# Patient Record
Sex: MALE | Race: WHITE | NOT HISPANIC OR LATINO | Employment: OTHER | URBAN - METROPOLITAN AREA
[De-identification: names, ages, dates, MRNs, and addresses within clinical notes are randomized per-mention and may not be internally consistent; named-entity substitution may affect disease eponyms.]

---

## 2017-01-09 RX ORDER — CARVEDILOL 3.12 MG/1
TABLET ORAL
Qty: 180 TABLET | Refills: 1 | Status: SHIPPED | OUTPATIENT
Start: 2017-01-09 | End: 2017-07-10 | Stop reason: SDUPTHER

## 2017-01-09 RX ORDER — LOSARTAN POTASSIUM 50 MG/1
TABLET ORAL
Qty: 45 TABLET | Refills: 1 | Status: SHIPPED | OUTPATIENT
Start: 2017-01-09 | End: 2017-07-10 | Stop reason: SDUPTHER

## 2017-05-08 RX ORDER — SPIRONOLACTONE 25 MG/1
TABLET ORAL
Qty: 90 TABLET | Refills: 0 | Status: SHIPPED | OUTPATIENT
Start: 2017-05-08 | End: 2017-08-07 | Stop reason: SDUPTHER

## 2017-07-10 RX ORDER — CARVEDILOL 3.12 MG/1
TABLET ORAL
Qty: 180 TABLET | Refills: 0 | Status: SHIPPED | OUTPATIENT
Start: 2017-07-10 | End: 2017-10-09 | Stop reason: SDUPTHER

## 2017-07-10 RX ORDER — LOSARTAN POTASSIUM 50 MG/1
TABLET ORAL
Qty: 45 TABLET | Refills: 0 | Status: SHIPPED | OUTPATIENT
Start: 2017-07-10 | End: 2017-10-09 | Stop reason: SDUPTHER

## 2017-08-08 RX ORDER — SPIRONOLACTONE 25 MG/1
TABLET ORAL
Qty: 90 TABLET | Refills: 0 | Status: SHIPPED | OUTPATIENT
Start: 2017-08-08 | End: 2017-11-06 | Stop reason: SDUPTHER

## 2017-08-15 ENCOUNTER — OFFICE VISIT (OUTPATIENT)
Dept: GASTROENTEROLOGY | Facility: CLINIC | Age: 62
End: 2017-08-15

## 2017-08-15 VITALS
DIASTOLIC BLOOD PRESSURE: 88 MMHG | HEIGHT: 68 IN | BODY MASS INDEX: 24.31 KG/M2 | WEIGHT: 160.4 LBS | SYSTOLIC BLOOD PRESSURE: 136 MMHG

## 2017-08-15 DIAGNOSIS — K62.5 RECTAL BLEEDING: Primary | ICD-10-CM

## 2017-08-15 DIAGNOSIS — R19.7 DIARRHEA, UNSPECIFIED TYPE: ICD-10-CM

## 2017-08-15 DIAGNOSIS — R10.84 GENERALIZED ABDOMINAL PAIN: ICD-10-CM

## 2017-08-15 PROCEDURE — 99204 OFFICE O/P NEW MOD 45 MIN: CPT | Performed by: INTERNAL MEDICINE

## 2017-08-15 RX ORDER — PEG-3350, SODIUM SULFATE, SODIUM CHLORIDE, POTASSIUM CHLORIDE, SODIUM ASCORBATE AND ASCORBIC ACID 7.5-2.691G
KIT ORAL
Qty: 1 EACH | Refills: 0 | Status: SHIPPED | OUTPATIENT
Start: 2017-08-15 | End: 2018-03-28

## 2017-08-15 NOTE — PROGRESS NOTES
PATIENT INFORMATION  Teofilo Jade       - 1955    CHIEF COMPLAINT  Chief Complaint   Patient presents with   • Abdominal Pain   • Rectal Bleeding   • Diarrhea       HISTORY OF PRESENT ILLNESS  Abdominal Pain   Associated symptoms include diarrhea and hematochezia.   Rectal Bleeding   Associated symptoms include abdominal pain and fatigue.   Diarrhea    Associated symptoms include abdominal pain.           REVIEW OF SYSTEMS  Review of Systems   Constitutional: Positive for appetite change, fatigue and unexpected weight change.   HENT: Positive for rhinorrhea.    Respiratory: Positive for apnea.    Gastrointestinal: Positive for abdominal pain, anal bleeding, blood in stool, diarrhea and hematochezia.   All other systems reviewed and are negative.        ACTIVE PROBLEMS  Patient Active Problem List    Diagnosis   • Nonischemic cardiomyopathy [I42.9]   • Hypertension [I10]   • SEBASTIAN (obstructive sleep apnea) [G47.33]         PAST MEDICAL HISTORY  Past Medical History:   Diagnosis Date   • Hypertension    • Nonischemic cardiomyopathy    • SEBASTIAN (obstructive sleep apnea)          SURGICAL HISTORY  Past Surgical History:   Procedure Laterality Date   • KNEE SURGERY  2002    both knee         FAMILY HISTORY  Family History   Problem Relation Age of Onset   • Heart disease Mother    • Diabetes Mother    • Stroke Mother    • Hypertension Mother    • Heart disease Father    • Diabetes Father          SOCIAL HISTORY  Social History     Occupational History   • Not on file.     Social History Main Topics   • Smoking status: Former Smoker   • Smokeless tobacco: Not on file      Comment: caffine 3 cups daily   • Alcohol use Yes      Comment: occ   • Drug use: Not on file   • Sexual activity: Not on file         CURRENT MEDICATIONS    Current Outpatient Prescriptions:   •  aspirin 81 MG tablet, Take  by mouth daily., Disp: , Rfl:   •  carvedilol (COREG) 3.125 MG tablet, TAKE 1 TABLET BY MOUTH 2 TIMES A DAY AS  "DIRECTED., Disp: 180 tablet, Rfl: 0  •  losartan (COZAAR) 50 MG tablet, TAKE 1/2 TABLET EVERY DAY., Disp: 45 tablet, Rfl: 0  •  Multiple Vitamins-Minerals (CENTRUM SILVER PO), Take 1 tablet by mouth Daily., Disp: , Rfl:   •  pentoxifylline (TRENtal) 400 MG CR tablet, , Disp: , Rfl:   •  spironolactone (ALDACTONE) 25 MG tablet, TAKE 1 TABLET BY MOUTH DAILY., Disp: 90 tablet, Rfl: 0  •  traZODone (DESYREL) 100 MG tablet, Take 100 mg by mouth Every Night., Disp: , Rfl:   •  zolpidem CR (AMBIEN CR) 12.5 MG CR tablet, Take  by mouth., Disp: , Rfl:     ALLERGIES  Review of patient's allergies indicates no known allergies.    VITALS  Vitals:    08/15/17 1410   Height: 68\" (172.7 cm)       LAST RESULTS   Hospital Outpatient Visit on 06/11/2015   Component Date Value Ref Range Status   • Glucose 06/11/2015 101* 65 - 99 mg/dL Final   • BUN 06/11/2015 14  6 - 20 mg/dL Final   • Creatinine 06/11/2015 1.04  0.76 - 1.27 mg/dL Final   • Sodium 06/11/2015 141  136 - 145 mmol/L Final   • Potassium 06/11/2015 4.4  3.5 - 5.2 mmol/L Final   • Chloride 06/11/2015 103  98 - 107 mmol/L Final   • CO2 06/11/2015 29  22 - 29 mmol/L Final   • Calcium 06/11/2015 9.0  8.6 - 10.5 mg/dL Final   • eGFR 06/11/2015 >60  ml/min/1.732 Final    Comment: DF by IF @ 06/11/2015 14:44  GFR Normal                            >60  Chronic Kidney Disease          <60  Kidney Failure                         <15       No results found.    PHYSICAL EXAM  Physical Exam    ASSESSMENT  Diagnoses and all orders for this visit:    Rectal bleeding    Diarrhea, unspecified type          PLAN  No Follow-up on file.                          "

## 2017-08-16 ENCOUNTER — APPOINTMENT (OUTPATIENT)
Dept: LAB | Facility: HOSPITAL | Age: 62
End: 2017-08-16
Attending: INTERNAL MEDICINE

## 2017-08-16 PROCEDURE — 87493 C DIFF AMPLIFIED PROBE: CPT | Performed by: INTERNAL MEDICINE

## 2017-08-16 PROCEDURE — 87045 FECES CULTURE AEROBIC BACT: CPT | Performed by: INTERNAL MEDICINE

## 2017-08-16 PROCEDURE — 87046 STOOL CULTR AEROBIC BACT EA: CPT | Performed by: INTERNAL MEDICINE

## 2017-08-17 LAB — C DIFF TOX GENS STL QL NAA+PROBE: NEGATIVE

## 2017-08-18 LAB
BACTERIA SPEC AEROBE CULT: NORMAL
BACTERIA SPEC AEROBE CULT: NORMAL

## 2017-09-29 ENCOUNTER — ANESTHESIA EVENT (OUTPATIENT)
Dept: PERIOP | Facility: HOSPITAL | Age: 62
End: 2017-09-29

## 2017-10-02 ENCOUNTER — ANESTHESIA (OUTPATIENT)
Dept: PERIOP | Facility: HOSPITAL | Age: 62
End: 2017-10-02

## 2017-10-02 ENCOUNTER — HOSPITAL ENCOUNTER (OUTPATIENT)
Facility: HOSPITAL | Age: 62
Setting detail: HOSPITAL OUTPATIENT SURGERY
Discharge: HOME | End: 2017-10-02
Attending: INTERNAL MEDICINE | Admitting: INTERNAL MEDICINE

## 2017-10-02 VITALS
HEART RATE: 51 BPM | HEIGHT: 68 IN | OXYGEN SATURATION: 96 % | DIASTOLIC BLOOD PRESSURE: 70 MMHG | WEIGHT: 155.13 LBS | BODY MASS INDEX: 23.51 KG/M2 | SYSTOLIC BLOOD PRESSURE: 123 MMHG | TEMPERATURE: 98.5 F | RESPIRATION RATE: 14 BRPM

## 2017-10-02 DIAGNOSIS — R19.7 DIARRHEA, UNSPECIFIED TYPE: ICD-10-CM

## 2017-10-02 DIAGNOSIS — K62.5 RECTAL BLEEDING: ICD-10-CM

## 2017-10-02 DIAGNOSIS — R10.84 GENERALIZED ABDOMINAL PAIN: ICD-10-CM

## 2017-10-02 LAB — C DIFF TOX GENS STL QL NAA+PROBE: NEGATIVE

## 2017-10-02 PROCEDURE — 87493 C DIFF AMPLIFIED PROBE: CPT | Performed by: INTERNAL MEDICINE

## 2017-10-02 PROCEDURE — 45380 COLONOSCOPY AND BIOPSY: CPT | Performed by: INTERNAL MEDICINE

## 2017-10-02 PROCEDURE — 25010000002 PROPOFOL 10 MG/ML EMULSION: Performed by: NURSE ANESTHETIST, CERTIFIED REGISTERED

## 2017-10-02 RX ORDER — ONDANSETRON 2 MG/ML
4 INJECTION INTRAMUSCULAR; INTRAVENOUS ONCE AS NEEDED
Status: CANCELLED | OUTPATIENT
Start: 2017-10-02

## 2017-10-02 RX ORDER — SODIUM CHLORIDE, SODIUM LACTATE, POTASSIUM CHLORIDE, CALCIUM CHLORIDE 600; 310; 30; 20 MG/100ML; MG/100ML; MG/100ML; MG/100ML
100 INJECTION, SOLUTION INTRAVENOUS CONTINUOUS
Status: CANCELLED | OUTPATIENT
Start: 2017-10-02

## 2017-10-02 RX ORDER — LIDOCAINE HYDROCHLORIDE 20 MG/ML
INJECTION, SOLUTION INFILTRATION; PERINEURAL AS NEEDED
Status: DISCONTINUED | OUTPATIENT
Start: 2017-10-02 | End: 2017-10-02 | Stop reason: SURG

## 2017-10-02 RX ORDER — SODIUM CHLORIDE, SODIUM LACTATE, POTASSIUM CHLORIDE, CALCIUM CHLORIDE 600; 310; 30; 20 MG/100ML; MG/100ML; MG/100ML; MG/100ML
9 INJECTION, SOLUTION INTRAVENOUS CONTINUOUS
Status: DISCONTINUED | OUTPATIENT
Start: 2017-10-02 | End: 2017-10-02 | Stop reason: HOSPADM

## 2017-10-02 RX ORDER — SODIUM CHLORIDE 0.9 % (FLUSH) 0.9 %
1-10 SYRINGE (ML) INJECTION AS NEEDED
Status: DISCONTINUED | OUTPATIENT
Start: 2017-10-02 | End: 2017-10-02 | Stop reason: HOSPADM

## 2017-10-02 RX ORDER — PROPOFOL 10 MG/ML
VIAL (ML) INTRAVENOUS CONTINUOUS PRN
Status: DISCONTINUED | OUTPATIENT
Start: 2017-10-02 | End: 2017-10-02 | Stop reason: SURG

## 2017-10-02 RX ORDER — LIDOCAINE HYDROCHLORIDE 10 MG/ML
0.5 INJECTION, SOLUTION EPIDURAL; INFILTRATION; INTRACAUDAL; PERINEURAL ONCE AS NEEDED
Status: COMPLETED | OUTPATIENT
Start: 2017-10-02 | End: 2017-10-02

## 2017-10-02 RX ORDER — SODIUM CHLORIDE 9 MG/ML
40 INJECTION, SOLUTION INTRAVENOUS AS NEEDED
Status: DISCONTINUED | OUTPATIENT
Start: 2017-10-02 | End: 2017-10-02 | Stop reason: HOSPADM

## 2017-10-02 RX ADMIN — LIDOCAINE HYDROCHLORIDE 0.5 ML: 10 INJECTION, SOLUTION EPIDURAL; INFILTRATION; INTRACAUDAL; PERINEURAL at 11:00

## 2017-10-02 RX ADMIN — PROPOFOL 60 MCG/KG/MIN: 10 INJECTION, EMULSION INTRAVENOUS at 11:13

## 2017-10-02 RX ADMIN — SODIUM CHLORIDE, POTASSIUM CHLORIDE, SODIUM LACTATE AND CALCIUM CHLORIDE 9 ML/HR: 600; 310; 30; 20 INJECTION, SOLUTION INTRAVENOUS at 10:59

## 2017-10-02 RX ADMIN — LIDOCAINE HYDROCHLORIDE 100 MG: 20 INJECTION, SOLUTION INFILTRATION; PERINEURAL at 11:13

## 2017-10-02 RX ADMIN — SODIUM CHLORIDE, POTASSIUM CHLORIDE, SODIUM LACTATE AND CALCIUM CHLORIDE: 600; 310; 30; 20 INJECTION, SOLUTION INTRAVENOUS at 10:43

## 2017-10-02 NOTE — OP NOTE
Colonoscopy Note:    Indication:  Abdominal pain, rectal bleeding, diarrhea    Consent: Procedure colonoscopy was explained to the patient and detail including but not limited to the, patient of bleeding perforation and possible reactions to sedation.    Sedation: Sedation was provided by anesthesia.    Procedure:  After excellent sedation a digital rectal examinations performed and a flexible colonoscope was inserted into the rectum there is colitis noted this scope was inserted into the rectum.  There is erythema and small ulcerations noted this was pretty severe up to about 75 cm from the anal verge from there to the level of the cecum there was colitis noted but with decreased erythema and edema.  There is small scattered ulcerations that were still noted.  The cecum was identified by both the ileocecal valve and the appendiceal orifice the right.  The ileocecal valve was intubated and the terminal ileum was entered.  This appeared normal.  Random biopsies were obtained.  Scope was slowly withdrawn back out.  In the ascending colon he had an area that appeared nodular and erythematous with some ulcerations.  It was hard to say if this is a true polyp versus an area of nodularity with erythema.  Nonetheless this area was removed using forceps in a piecemeal fashion.  Random biopsies were obtained of the right colon, transverse colon, left colon and rectum.  The scope was then slowly withdrawn to the rectum and retroflex were internal hemorrhoids are noted.  Stool samples were obtained.    Impression/Plan:  Pan colitis  Nodular area in the ascending colon  Internal hemorrhoids  This appears likely consistent with ulcerative colitis.  We'll await biopsy and stool study results.  He'll see me back in the office in about 1 week.

## 2017-10-02 NOTE — ANESTHESIA PREPROCEDURE EVALUATION
Anesthesia Evaluation     Patient summary reviewed and Nursing notes reviewed   no history of anesthetic complications:  NPO Solid Status: > 8 hours  NPO Liquid Status: > 8 hours     Airway   Mallampati: II  TM distance: >3 FB  Neck ROM: full  no difficulty expected  Dental    (+) lower dentures and upper dentures    Pulmonary     breath sounds clear to auscultation  (+) a smoker (quit 5 yrs ago) Former, sleep apnea on CPAP,   Cardiovascular   Exercise tolerance: good (4-7 METS)    ECG reviewed  Rhythm: regular  Rate: normal    (+) hypertension well controlled,     ROS comment: Sunil Wolf III, MD     12/7/2016 10:47 AM    ECG 12 Lead  Date/Time: 12/7/2016 10:45 AM  Performed by: SUNIL WOLF III  Authorized by: SUNIL WOLF III   Comparison: compared with previous ECG   Similar to previous ECG  Rhythm: sinus rhythm  Rate: normal  Conduction: conduction normal  ST Segments: ST segments normal  T Waves: T waves normal  QRS axis: normal  Other: no other findings  Clinical impression: normal ECG    Nonischemic cardiomyopathy    Neuro/Psych- negative ROS  GI/Hepatic/Renal/Endo    (+)  PUD,     ROS Comment: Lost 20# in feb.      Musculoskeletal (-) negative ROS    Abdominal    Substance History   (+) alcohol use (occ),      OB/GYN          Other - negative ROS           Phys Exam Other: Partial denture upper and lower                                Anesthesia Plan    ASA 2     MAC     intravenous induction   Anesthetic plan and risks discussed with patient.  Use of blood products discussed with patient  Consented to blood products.

## 2017-10-02 NOTE — H&P
PATIENT INFORMATION  Teofilo Jade         - 1955     CHIEF COMPLAINT      Chief Complaint   Patient presents with   • Abdominal Pain   • Rectal Bleeding   • Diarrhea         HISTORY OF PRESENT ILLNESS  Abdominal Pain   Associated symptoms include diarrhea and hematochezia.   Rectal Bleeding   Associated symptoms include abdominal pain and fatigue.   Diarrhea    Associated symptoms include abdominal pain.      62 yo with 6 weeks of rectal bleeding. He was tried with 2 rounds of anucort without complete resolution. He has history of hemorrhoids.  He has also noticed postprandial diarrhea, within 15 minutes of eating, with associated crampy lower abdnminal pain. Diarrhea is watery/bloody and with mucus.  Abdominal pain is sharp, periumbilical , without radiation.  Weight has been down 20 pounds in February but has been stable since.  Last cls was over 9 years ago.   Labs from pcp normal per patient. No fever or chills. No melena.  Mom with colon polyps.  REVIEW OF SYSTEMS  Review of Systems   Constitutional: Positive for appetite change, fatigue and unexpected weight change.   HENT: Positive for rhinorrhea.    Gastrointestinal: Positive for abdominal pain, anal bleeding, blood in stool, diarrhea and hematochezia.   Psychiatric/Behavioral:        Anxiety   All other systems reviewed and are negative.           ACTIVE PROBLEMS      Patient Active Problem List     Diagnosis   • Nonischemic cardiomyopathy [I42.9]   • Hypertension [I10]   • SEBASTIAN (obstructive sleep apnea) [G47.33]            PAST MEDICAL HISTORY   Medical History         Past Medical History:   Diagnosis Date   • Colon polyp     • Hypertension     • Nonischemic cardiomyopathy     • SEBASTIAN (obstructive sleep apnea)                 SURGICAL HISTORY   Surgical History          Past Surgical History:   Procedure Laterality Date   • COLONOSCOPY       • KNEE SURGERY   2002     both knee               FAMILY HISTORY        Family History   Problem Relation  "Age of Onset   • Heart disease Mother     • Diabetes Mother     • Stroke Mother     • Hypertension Mother     • Colon polyps Mother     • Heart disease Father     • Diabetes Father              SOCIAL HISTORY  Social History          Occupational History   • Not on file.              Social History Main Topics    • Smoking status: Former Smoker    • Smokeless tobacco: Not on file          Comment: caffine 3 cups daily    • Alcohol use Yes         Comment: occ    • Drug use: Not on file    • Sexual activity: Not on file             CURRENT MEDICATIONS     Current Outpatient Prescriptions:   •  aspirin 81 MG tablet, Take  by mouth daily., Disp: , Rfl:   •  carvedilol (COREG) 3.125 MG tablet, TAKE 1 TABLET BY MOUTH 2 TIMES A DAY AS DIRECTED., Disp: 180 tablet, Rfl: 0  •  losartan (COZAAR) 50 MG tablet, TAKE 1/2 TABLET EVERY DAY., Disp: 45 tablet, Rfl: 0  •  Multiple Vitamins-Minerals (CENTRUM SILVER PO), Take 1 tablet by mouth Daily., Disp: , Rfl:   •  pentoxifylline (TRENtal) 400 MG CR tablet, , Disp: , Rfl:   •  spironolactone (ALDACTONE) 25 MG tablet, TAKE 1 TABLET BY MOUTH DAILY., Disp: 90 tablet, Rfl: 0  •  traZODone (DESYREL) 100 MG tablet, Take 100 mg by mouth Every Night., Disp: , Rfl:   •  zolpidem CR (AMBIEN CR) 12.5 MG CR tablet, Take  by mouth., Disp: , Rfl:      ALLERGIES  Review of patient's allergies indicates no known allergies.     VITALS   Vitals        Vitals:     08/15/17 1410   BP: 136/88   Weight: 160 lb 6.4 oz (72.8 kg)   Height: 68\" (172.7 cm)            LAST RESULTS                             Hospital Outpatient Visit on 06/11/2015   Component Date Value Ref Range Status   • Glucose 06/11/2015 101* 65 - 99 mg/dL Final   • BUN 06/11/2015 14  6 - 20 mg/dL Final   • Creatinine 06/11/2015 1.04  0.76 - 1.27 mg/dL Final   • Sodium 06/11/2015 141  136 - 145 mmol/L Final   • Potassium 06/11/2015 4.4  3.5 - 5.2 mmol/L Final   • Chloride 06/11/2015 103  98 - 107 mmol/L Final   • CO2 06/11/2015 29  22 - " 29 mmol/L Final   • Calcium 06/11/2015 9.0  8.6 - 10.5 mg/dL Final   • eGFR 06/11/2015 >60  ml/min/1.732 Final     Comment: DF by IF @ 06/11/2015 14:44  GFR Normal                            >60  Chronic Kidney Disease          <60  Kidney Failure                         <15      No results found.     PHYSICAL EXAM  Physical Exam   Constitutional: He is oriented to person, place, and time. He appears well-developed and well-nourished. No distress.   HENT:   Head: Normocephalic and atraumatic.   Eyes: EOM are normal. Pupils are equal, round, and reactive to light.   Neck: Neck supple. No tracheal deviation present.   Cardiovascular: Normal rate, regular rhythm, normal heart sounds and intact distal pulses.  Exam reveals no gallop and no friction rub.    No murmur heard.  Pulmonary/Chest: Effort normal and breath sounds normal. No respiratory distress. He has no wheezes. He has no rales. He exhibits no tenderness.   Abdominal: Soft. Bowel sounds are normal. He exhibits no distension. There is no tenderness. There is no rebound and no guarding.   Musculoskeletal: He exhibits no edema.   Lymphadenopathy:     He has no cervical adenopathy.   Neurological: He is alert and oriented to person, place, and time.   Skin: Skin is warm. He is not diaphoretic. No erythema.   Psychiatric: He has a normal mood and affect. His behavior is normal. Judgment and thought content normal.   Nursing note and vitals reviewed.        ASSESSMENT  Diagnoses and all orders for this visit:     Rectal bleeding  -     Stool Culture  -     Clostridium Difficile Toxin  -     Case Request; Standing  -     Case Request     Diarrhea, unspecified type  -     Stool Culture  -     Clostridium Difficile Toxin  -     Case Request; Standing  -     Case Request     Generalized abdominal pain  -     Stool Culture  -     Clostridium Difficile Toxin  -     Case Request; Standing  -     Case Request     Other orders  -     Implement Anesthesia orders day of  procedure.; Standing  -     Obtain informed consent; Standing              PLAN  No Follow-up on file.     Get labs from pcp office.        Risks, benefits and alternatives discussed including but not limited to the complications of bleeding, perforation and sedation related problems.

## 2017-10-02 NOTE — ANESTHESIA POSTPROCEDURE EVALUATION
Patient: Teofilo Jade    Procedure Summary     Date Anesthesia Start Anesthesia Stop Room / Location    10/02/17 1111 1200 [facility] / [facility]       Procedure Diagnosis Surgeon Provider    COLONOSCOPY, biopsy; polypectomy (N/A ) Rectal bleeding; Generalized abdominal pain; Diarrhea, unspecified type  (Rectal bleeding [K62.5]; Generalized abdominal pain [R10.84]; Diarrhea, unspecified type [R19.7]) MD Jennifer Thomas CRNA          Anesthesia Type: MAC  Last vitals  BP   123/70 (10/02/17 1300)    Temp        Pulse   51 (10/02/17 1300)   Resp   14 (10/02/17 1300)    SpO2   96 % (10/02/17 1300)      Post Anesthesia Care and Evaluation    Patient location during evaluation: PHASE II  Patient participation: complete - patient participated  Level of consciousness: awake and alert  Pain score: 0  Pain management: adequate  Airway patency: patent  Anesthetic complications: No anesthetic complications  PONV Status: none  Cardiovascular status: acceptable  Respiratory status: acceptable  Hydration status: acceptable

## 2017-10-02 NOTE — PLAN OF CARE
Problem: Patient Care Overview (Adult)  Goal: Plan of Care Review  Outcome: Ongoing (interventions implemented as appropriate)    10/02/17 1058   Coping/Psychosocial Response Interventions   Plan Of Care Reviewed With patient   Patient Care Overview   Progress improving   Outcome Evaluation   Outcome Summary/Follow up Plan vss, ready for or       Goal: Adult Individualization and Mutuality  Outcome: Ongoing (interventions implemented as appropriate)    Problem: GI Endoscopy (Adult)  Goal: Signs and Symptoms of Listed Potential Problems Will be Absent or Manageable (GI Endoscopy)  Outcome: Ongoing (interventions implemented as appropriate)

## 2017-10-02 NOTE — PLAN OF CARE
Problem: GI Endoscopy (Adult)  Goal: Signs and Symptoms of Listed Potential Problems Will be Absent or Manageable (GI Endoscopy)  Outcome: Ongoing (interventions implemented as appropriate)    10/02/17 1117   GI Endoscopy   Problems Assessed (GI Endoscopy) all   Problems Present (GI Endoscopy) none

## 2017-10-09 RX ORDER — CARVEDILOL 3.12 MG/1
TABLET ORAL
Qty: 180 TABLET | Refills: 0 | Status: SHIPPED | OUTPATIENT
Start: 2017-10-09 | End: 2017-12-16 | Stop reason: SDUPTHER

## 2017-10-09 RX ORDER — LOSARTAN POTASSIUM 50 MG/1
TABLET ORAL
Qty: 45 TABLET | Refills: 0 | Status: SHIPPED | OUTPATIENT
Start: 2017-10-09 | End: 2017-12-16 | Stop reason: SDUPTHER

## 2017-10-10 ENCOUNTER — OFFICE VISIT (OUTPATIENT)
Dept: GASTROENTEROLOGY | Facility: CLINIC | Age: 62
End: 2017-10-10

## 2017-10-10 VITALS
DIASTOLIC BLOOD PRESSURE: 68 MMHG | SYSTOLIC BLOOD PRESSURE: 128 MMHG | BODY MASS INDEX: 23.89 KG/M2 | WEIGHT: 157.6 LBS | HEIGHT: 68 IN

## 2017-10-10 DIAGNOSIS — K51.00 ULCERATIVE PANCOLITIS WITHOUT COMPLICATION (HCC): ICD-10-CM

## 2017-10-10 DIAGNOSIS — K62.5 RECTAL BLEEDING: Primary | ICD-10-CM

## 2017-10-10 PROCEDURE — 99214 OFFICE O/P EST MOD 30 MIN: CPT | Performed by: INTERNAL MEDICINE

## 2017-10-10 RX ORDER — MESALAMINE 1.2 G/1
1200 TABLET, DELAYED RELEASE ORAL 4 TIMES DAILY
Qty: 120 TABLET | Refills: 4 | Status: SHIPPED | OUTPATIENT
Start: 2017-10-10 | End: 2018-04-04 | Stop reason: SDUPTHER

## 2017-10-10 NOTE — PROGRESS NOTES
PATIENT INFORMATION  Teofilo Jade       - 1955    CHIEF COMPLAINT  Chief Complaint   Patient presents with   • Abdominal Pain   • Rectal Bleeding   • Diarrhea       HISTORY OF PRESENT ILLNESS  Abdominal Pain   Associated symptoms include diarrhea and hematochezia.   Rectal Bleeding   Associated symptoms include abdominal pain and fatigue.   Diarrhea    Associated symptoms include abdominal pain.       He is here today in follow after his cls. Pathology is reviewed with him and is consistent with UC. He is noticing  Intermittent crampy pain and diarrhea and blood in the stool. No fever or chills. Weight has been stable.   Stool negative for c.diff.  REVIEW OF SYSTEMS  Review of Systems   Constitutional: Positive for appetite change and fatigue.   Respiratory: Positive for apnea.    Gastrointestinal: Positive for abdominal pain, anal bleeding, blood in stool, diarrhea, hematochezia and rectal pain.   All other systems reviewed and are negative.        ACTIVE PROBLEMS  Patient Active Problem List    Diagnosis   • Rectal bleeding [K62.5]     Overview Note:     Added automatically from request for surgery [id number]     • Generalized abdominal pain [R10.84]     Overview Note:     Added automatically from request for surgery [id number]     • Diarrhea [R19.7]     Overview Note:     Added automatically from request for surgery [id number]     • Nonischemic cardiomyopathy [I42.8]   • Hypertension [I10]   • SEBASTIAN (obstructive sleep apnea) [G47.33]         PAST MEDICAL HISTORY  Past Medical History:   Diagnosis Date   • Anxiety    • Colon polyp    • Hypertension    • Nonischemic cardiomyopathy    • SEBASTIAN (obstructive sleep apnea)    • Ulcer of abdomen wall          SURGICAL HISTORY  Past Surgical History:   Procedure Laterality Date   • COLONOSCOPY     • COLONOSCOPY N/A 10/2/2017    Procedure: COLONOSCOPY, biopsy; polypectomy;  Surgeon: Sushila Amezquita MD;  Location: [facility];  Service:    • KNEE SURGERY  2002    both knee  "        FAMILY HISTORY  Family History   Problem Relation Age of Onset   • Heart disease Mother    • Diabetes Mother    • Stroke Mother    • Hypertension Mother    • Colon polyps Mother    • Heart disease Father    • Diabetes Father          SOCIAL HISTORY  Social History     Occupational History   • Not on file.     Social History Main Topics   • Smoking status: Former Smoker   • Smokeless tobacco: Never Used      Comment: caffine 3 cups daily - quit 5 yrs ago   • Alcohol use Yes      Comment: occ -    • Drug use: No   • Sexual activity: Defer         CURRENT MEDICATIONS    Current Outpatient Prescriptions:   •  aspirin 81 MG tablet, Take  by mouth daily., Disp: , Rfl:   •  carvedilol (COREG) 3.125 MG tablet, TAKE 1 TABLET BY MOUTH 2 TIMES A DAY AS DIRECTED., Disp: 180 tablet, Rfl: 0  •  losartan (COZAAR) 50 MG tablet, TAKE 1/2 TABLET EVERY DAY., Disp: 45 tablet, Rfl: 0  •  mesalamine (LIALDA) 1.2 g EC tablet, Take 1 tablet by mouth 4 (Four) Times a Day., Disp: 120 tablet, Rfl: 4  •  Multiple Vitamins-Minerals (CENTRUM SILVER PO), Take 1 tablet by mouth Daily., Disp: , Rfl:   •  PEG-KCl-NaCl-NaSulf-Na Asc-C (MOVIPREP) 100 g reconstituted solution, Take as directed, Disp: 1 each, Rfl: 0  •  pentoxifylline (TRENtal) 400 MG CR tablet, , Disp: , Rfl:   •  spironolactone (ALDACTONE) 25 MG tablet, TAKE 1 TABLET BY MOUTH DAILY., Disp: 90 tablet, Rfl: 0  •  traZODone (DESYREL) 100 MG tablet, Take 100 mg by mouth Every Night., Disp: , Rfl:   •  zolpidem CR (AMBIEN CR) 12.5 MG CR tablet, Take  by mouth., Disp: , Rfl:     ALLERGIES  Review of patient's allergies indicates no known allergies.    VITALS  Vitals:    10/10/17 1351   BP: 128/68   Weight: 157 lb 9.6 oz (71.5 kg)   Height: 68\" (172.7 cm)       LAST RESULTS   Admission on 10/02/2017, Discharged on 10/02/2017   Component Date Value Ref Range Status   • C. Difficile Toxins by PCR 10/02/2017 Negative  Negative Final     No results found.    PHYSICAL EXAM  Physical Exam "   Constitutional: He is oriented to person, place, and time. He appears well-developed and well-nourished. No distress.   HENT:   Head: Normocephalic and atraumatic.   Eyes: EOM are normal. Pupils are equal, round, and reactive to light.   Neck: Neck supple. No tracheal deviation present.   Cardiovascular: Normal rate, regular rhythm, normal heart sounds and intact distal pulses.  Exam reveals no gallop and no friction rub.    No murmur heard.  Pulmonary/Chest: Effort normal and breath sounds normal. No respiratory distress. He has no wheezes. He has no rales. He exhibits no tenderness.   Abdominal: Soft. Bowel sounds are normal. He exhibits no distension. There is tenderness. There is no rebound and no guarding.   Mild lower abdominal tenderness, no rebound or guarding.   Musculoskeletal: He exhibits no edema.   Lymphadenopathy:     He has no cervical adenopathy.   Neurological: He is alert and oriented to person, place, and time.   Skin: Skin is warm. He is not diaphoretic. No erythema.   Psychiatric: He has a normal mood and affect. His behavior is normal. Judgment and thought content normal.   Nursing note and vitals reviewed.      ASSESSMENT  Diagnoses and all orders for this visit:    Rectal bleeding    Ulcerative pancolitis without complication    Other orders  -     mesalamine (LIALDA) 1.2 g EC tablet; Take 1 tablet by mouth 4 (Four) Times a Day.          PLAN  No Follow-up on file.    Check cr in 4-6 weeks on follow up.

## 2017-10-11 PROBLEM — K51.00 ULCERATIVE PANCOLITIS WITHOUT COMPLICATION: Status: ACTIVE | Noted: 2017-10-11

## 2017-10-16 LAB
LAB AP CASE REPORT: NORMAL
Lab: NORMAL
PATH REPORT.FINAL DX SPEC: NORMAL

## 2017-10-31 ENCOUNTER — TELEPHONE (OUTPATIENT)
Dept: CARDIOLOGY | Facility: CLINIC | Age: 62
End: 2017-10-31

## 2017-10-31 NOTE — TELEPHONE ENCOUNTER
Pt called he has been diagnose with ulcerative colitis by Dr.Shelia Amezquita. He is wanting to know if it safe for him to discontinue his Aspirin 81 MG? Please Advise  PT #: [phone]    Thank Kathleen

## 2017-11-06 RX ORDER — SPIRONOLACTONE 25 MG/1
TABLET ORAL
Qty: 90 TABLET | Refills: 1 | Status: SHIPPED | OUTPATIENT
Start: 2017-11-06 | End: 2018-05-21 | Stop reason: SDUPTHER

## 2017-11-28 ENCOUNTER — OFFICE VISIT (OUTPATIENT)
Dept: GASTROENTEROLOGY | Facility: CLINIC | Age: 62
End: 2017-11-28

## 2017-11-28 ENCOUNTER — APPOINTMENT (OUTPATIENT)
Dept: LAB | Facility: HOSPITAL | Age: 62
End: 2017-11-28

## 2017-11-28 VITALS
HEIGHT: 68 IN | SYSTOLIC BLOOD PRESSURE: 128 MMHG | BODY MASS INDEX: 25.79 KG/M2 | WEIGHT: 170.2 LBS | DIASTOLIC BLOOD PRESSURE: 70 MMHG

## 2017-11-28 DIAGNOSIS — K51.00 ULCERATIVE PANCOLITIS WITHOUT COMPLICATION (HCC): Primary | ICD-10-CM

## 2017-11-28 LAB
ANION GAP SERPL CALCULATED.3IONS-SCNC: 8.8 MMOL/L
BUN BLD-MCNC: 13 MG/DL (ref 8–23)
BUN/CREAT SERPL: 13 (ref 7–25)
CALCIUM SPEC-SCNC: 8.5 MG/DL (ref 8.8–10.5)
CHLORIDE SERPL-SCNC: 102 MMOL/L (ref 98–107)
CO2 SERPL-SCNC: 29.2 MMOL/L (ref 22–29)
CREAT BLD-MCNC: 1 MG/DL (ref 0.76–1.27)
GFR SERPL CREATININE-BSD FRML MDRD: 76 ML/MIN/1.73
GLUCOSE BLD-MCNC: 102 MG/DL (ref 65–99)
POTASSIUM BLD-SCNC: 4 MMOL/L (ref 3.5–5.2)
SODIUM BLD-SCNC: 140 MMOL/L (ref 136–145)

## 2017-11-28 PROCEDURE — 99213 OFFICE O/P EST LOW 20 MIN: CPT | Performed by: INTERNAL MEDICINE

## 2017-11-28 PROCEDURE — 36415 COLL VENOUS BLD VENIPUNCTURE: CPT | Performed by: INTERNAL MEDICINE

## 2017-11-28 PROCEDURE — 80048 BASIC METABOLIC PNL TOTAL CA: CPT | Performed by: INTERNAL MEDICINE

## 2017-11-28 NOTE — PROGRESS NOTES
PATIENT INFORMATION  Teofilo Jade       - 1955    CHIEF COMPLAINT  Chief Complaint   Patient presents with   • Diarrhea     6 week follow up       HISTORY OF PRESENT ILLNESS  HPI  He is feeling well. No abdominal pain. BM are once daily, formed and no blood. He is on lialda. Weight is stable.      REVIEW OF SYSTEMS  Review of Systems   HENT: Positive for rhinorrhea.    Respiratory: Positive for apnea.    All other systems reviewed and are negative.        ACTIVE PROBLEMS  Patient Active Problem List    Diagnosis   • Ulcerative pancolitis without complication [K51.00]   • Rectal bleeding [K62.5]     Overview Note:     Added automatically from request for surgery [id number]     • Generalized abdominal pain [R10.84]     Overview Note:     Added automatically from request for surgery [id number]     • Diarrhea [R19.7]     Overview Note:     Added automatically from request for surgery [id number]     • Nonischemic cardiomyopathy [I42.8]   • Hypertension [I10]   • SEBASTIAN (obstructive sleep apnea) [G47.33]         PAST MEDICAL HISTORY  Past Medical History:   Diagnosis Date   • Anxiety    • Colon polyp    • Hypertension    • Nonischemic cardiomyopathy    • SEBASTIAN (obstructive sleep apnea)    • Ulcer of abdomen wall          SURGICAL HISTORY  Past Surgical History:   Procedure Laterality Date   • COLONOSCOPY     • COLONOSCOPY N/A 10/2/2017    Procedure: COLONOSCOPY, biopsy; polypectomy;  Surgeon: Sushila Amezquita MD;  Location: [facility] OR;  Service:    • KNEE SURGERY  2002    both knee         FAMILY HISTORY  Family History   Problem Relation Age of Onset   • Heart disease Mother    • Diabetes Mother    • Stroke Mother    • Hypertension Mother    • Colon polyps Mother    • Heart disease Father    • Diabetes Father          SOCIAL HISTORY  Social History     Occupational History   • Not on file.     Social History Main Topics   • Smoking status: Former Smoker   • Smokeless tobacco: Never Used      Comment: caffine 3 cups daily -  "quit 5 yrs ago   • Alcohol use Yes      Comment: occ -    • Drug use: No   • Sexual activity: Defer         CURRENT MEDICATIONS    Current Outpatient Prescriptions:   •  aspirin 81 MG tablet, Take  by mouth daily., Disp: , Rfl:   •  carvedilol (COREG) 3.125 MG tablet, TAKE 1 TABLET BY MOUTH 2 TIMES A DAY AS DIRECTED., Disp: 180 tablet, Rfl: 0  •  losartan (COZAAR) 50 MG tablet, TAKE 1/2 TABLET EVERY DAY., Disp: 45 tablet, Rfl: 0  •  mesalamine (LIALDA) 1.2 g EC tablet, Take 1 tablet by mouth 4 (Four) Times a Day., Disp: 120 tablet, Rfl: 4  •  Multiple Vitamins-Minerals (CENTRUM SILVER PO), Take 1 tablet by mouth Daily., Disp: , Rfl:   •  PEG-KCl-NaCl-NaSulf-Na Asc-C (MOVIPREP) 100 g reconstituted solution, Take as directed, Disp: 1 each, Rfl: 0  •  pentoxifylline (TRENtal) 400 MG CR tablet, , Disp: , Rfl:   •  spironolactone (ALDACTONE) 25 MG tablet, TAKE 1 TABLET BY MOUTH DAILY., Disp: 90 tablet, Rfl: 1  •  traZODone (DESYREL) 100 MG tablet, Take 100 mg by mouth Every Night., Disp: , Rfl:   •  zolpidem CR (AMBIEN CR) 12.5 MG CR tablet, Take  by mouth., Disp: , Rfl:     ALLERGIES  Review of patient's allergies indicates no known allergies.    VITALS  Vitals:    11/28/17 1332   BP: 128/70   Weight: 170 lb 3.2 oz (77.2 kg)   Height: 68\" (172.7 cm)       LAST RESULTS   Admission on 10/02/2017, Discharged on 10/02/2017   Component Date Value Ref Range Status   • C. Difficile Toxins by PCR 10/02/2017 Negative  Negative Final   • Case Report 10/02/2017    Final                    Value:Surgical Pathology Report                         Case: [id number]                                  Authorizing Provider:  Sushila Amezquita MD          Collected:           10/02/2017 11:30 AM          Ordering Location:     [facility]   Received:            10/02/2017 02:30 PM                                 OR                                                                           Pathologist:           Dheeraj Leger, " MD                                                      Specimens:   1) - Small Intestine, Ileum, TI biopsy                                                              2) - Large Intestine, Right / Ascending Colon                                                       3) - Large Intestine, Right / Ascending Colon, random bx                                            4) - Large Intestine, Transverse Colon, random bx                                                   5) - Large Intestine, Left / Descending Colon, random bx                                                                      6) - Large Intestine, Rectum, random bx                                                   • Final Diagnosis 10/02/2017    Final                    Value:This result contains rich text formatting which cannot be displayed here.     No results found.    PHYSICAL EXAM  Physical Exam   Constitutional: He is oriented to person, place, and time. He appears well-developed and well-nourished. No distress.   HENT:   Head: Normocephalic and atraumatic.   Eyes: EOM are normal. Pupils are equal, round, and reactive to light.   Neck: Neck supple. No tracheal deviation present.   Cardiovascular: Normal rate, regular rhythm, normal heart sounds and intact distal pulses.  Exam reveals no gallop and no friction rub.    No murmur heard.  Pulmonary/Chest: Effort normal and breath sounds normal. No respiratory distress. He has no wheezes. He has no rales. He exhibits no tenderness.   Abdominal: Soft. Bowel sounds are normal. He exhibits no distension. There is no tenderness. There is no rebound and no guarding.   Musculoskeletal: He exhibits no edema.   Lymphadenopathy:     He has no cervical adenopathy.   Neurological: He is alert and oriented to person, place, and time.   Skin: Skin is warm. He is not diaphoretic. No erythema.   Psychiatric: He has a normal mood and affect. His behavior is normal. Judgment and thought content normal.   Nursing note  and vitals reviewed.      ASSESSMENT  Diagnoses and all orders for this visit:    Ulcerative pancolitis without complication  -     Basic Metabolic Panel          PLAN  Return in about 3 months (around 2/28/2018).      Continue on lialda.

## 2017-12-19 RX ORDER — CARVEDILOL 3.12 MG/1
TABLET ORAL
Qty: 180 TABLET | Refills: 0 | Status: SHIPPED | OUTPATIENT
Start: 2017-12-19 | End: 2018-04-03 | Stop reason: SDUPTHER

## 2017-12-19 RX ORDER — LOSARTAN POTASSIUM 50 MG/1
TABLET ORAL
Qty: 45 TABLET | Refills: 0 | Status: SHIPPED | OUTPATIENT
Start: 2017-12-19 | End: 2018-04-02 | Stop reason: SDUPTHER

## 2018-03-28 ENCOUNTER — OFFICE VISIT (OUTPATIENT)
Dept: CARDIOLOGY | Facility: CLINIC | Age: 63
End: 2018-03-28

## 2018-03-28 VITALS
HEIGHT: 69 IN | WEIGHT: 170.2 LBS | DIASTOLIC BLOOD PRESSURE: 68 MMHG | SYSTOLIC BLOOD PRESSURE: 128 MMHG | HEART RATE: 67 BPM | BODY MASS INDEX: 25.21 KG/M2

## 2018-03-28 DIAGNOSIS — G47.33 OSA (OBSTRUCTIVE SLEEP APNEA): Chronic | ICD-10-CM

## 2018-03-28 DIAGNOSIS — I10 ESSENTIAL HYPERTENSION: Primary | ICD-10-CM

## 2018-03-28 DIAGNOSIS — K51.00 ULCERATIVE PANCOLITIS WITHOUT COMPLICATION (HCC): ICD-10-CM

## 2018-03-28 DIAGNOSIS — Z86.79 HISTORY OF CARDIOMYOPATHY: Chronic | ICD-10-CM

## 2018-03-28 PROCEDURE — 99214 OFFICE O/P EST MOD 30 MIN: CPT | Performed by: INTERNAL MEDICINE

## 2018-03-28 PROCEDURE — 93000 ELECTROCARDIOGRAM COMPLETE: CPT | Performed by: INTERNAL MEDICINE

## 2018-03-28 RX ORDER — CLONAZEPAM 0.5 MG/1
0.5 TABLET ORAL 2 TIMES DAILY PRN
COMMUNITY
End: 2020-06-25

## 2018-03-28 NOTE — PROGRESS NOTES
Subjective:     Encounter Date: 3/28/2018      Patient ID: Teofilo Jade is a 62 y.o. male.    Chief Complaint:  History of Present Illness    Dear Dr. Koroma,    This patient comes in for routine follow-up of their cardiac status.  It has been one year since his last visit.He has a history of cardiomyopathy with recovery of function.    He tells me that he has done well over the last year without any cardiac complaints.  His only issue was that he was diagnosed with ulcerative colitis.  This patient denies any cardiac complaints.  This patient denies any chest pain, pressure, tightness, squeezing, or heartburn.  This patient has not experienced any feeling of palpitations, tachycardia or heart racing and no presyncope or syncope.  There has not been any problems with dizziness or lightheadedness.  There has not been any orthopnea or PND, and no problems with lower extremity edema.  This patient denies any shortness of breath at rest or with activity and has not had any wheezing.  This patient has not had any problems with unexplained nausea or vomiting. The patient has continued to perform daily activities of living without any specific problem or change in the level of activity.  This patient has not been recently hospitalized for any reason.    He has a history of cardiomyopathy, nonischemic, with recovery of function.  His most recent echocardiogram was performed November 23, 2015:      His history dates back to 2012 when he was found to have diminished left ventricular function.  He was seen by Dr. Weiss at that time.  He had a stress test and then a cardiac catheterization which documented a nonischemic cardiomyopathy.  He was treated with medical therapy.  In 2014 he was seen again by Dr. Malloy and he had a Lexiscan Cardiolite performed.  No ischemia was seen in the ejection fraction was 50%.    The following portions of the patient's history were reviewed and updated as appropriate: allergies, current  medications, past family history, past medical history, past social history, past surgical history and problem list.    Past Medical History:   Diagnosis Date   • Anxiety    • Colon polyp    • Hypertension    • Nonischemic cardiomyopathy    • SEBASTIAN (obstructive sleep apnea)    • Ulcer of abdomen wall        Past Surgical History:   Procedure Laterality Date   • COLONOSCOPY     • COLONOSCOPY N/A 10/2/2017    Procedure: COLONOSCOPY, biopsy; polypectomy;  Surgeon: Sushila Amezquita MD;  Location: [facility];  Service:    • KNEE SURGERY  2002 2003    both knee       Social History     Social History   • Marital status:      Spouse name: N/A   • Number of children: N/A   • Years of education: N/A     Occupational History   • Not on file.     Social History Main Topics   • Smoking status: Former Smoker   • Smokeless tobacco: Never Used      Comment: caffine 3 cups daily - quit 5 yrs ago   • Alcohol use Yes      Comment: occ    • Drug use: No   • Sexual activity: Defer     Other Topics Concern   • Not on file     Social History Narrative   • No narrative on file       Review of Systems   Constitution: Negative for chills, decreased appetite, fever and night sweats.   HENT: Negative for ear discharge, ear pain, hearing loss, nosebleeds and sore throat.    Eyes: Negative for blurred vision, double vision and pain.   Cardiovascular: Negative for cyanosis.   Respiratory: Negative for hemoptysis and sputum production.    Endocrine: Negative for cold intolerance and heat intolerance.   Hematologic/Lymphatic: Negative for adenopathy.   Skin: Negative for dry skin, itching, nail changes, rash and suspicious lesions.   Musculoskeletal: Negative for arthritis, gout, muscle cramps, muscle weakness, myalgias and neck pain.   Gastrointestinal: Negative for anorexia, bowel incontinence, constipation, diarrhea, dysphagia, hematemesis and jaundice.   Genitourinary: Negative for bladder incontinence, dysuria, flank pain, frequency,  "hematuria and nocturia.   Neurological: Negative for focal weakness, numbness, paresthesias and seizures.   Psychiatric/Behavioral: Negative for altered mental status, hallucinations, hypervigilance, suicidal ideas and thoughts of violence.   Allergic/Immunologic: Negative for persistent infections.         ECG 12 Lead  Date/Time: 3/28/2018 10:38 AM  Performed by: TAYLOR BRYANT III  Authorized by: TAYLOR BRYANT III   Comparison: compared with previous ECG   Similar to previous ECG  Rhythm: sinus rhythm  Rate: normal  Conduction: conduction normal  ST Segments: ST segments normal  T Waves: T waves normal  QRS axis: normal  Other: no other findings  Clinical impression: normal ECG               Objective:     Vitals:    03/28/18 1028   BP: 128/68   BP Location: Right arm   Pulse: 67   Weight: 77.2 kg (170 lb 3.2 oz)   Height: 174 cm (68.5\")         Physical Exam   Constitutional: He is oriented to person, place, and time. He appears well-developed and well-nourished. No distress.   HENT:   Head: Normocephalic and atraumatic.   Nose: Nose normal.   Mouth/Throat: Oropharynx is clear and moist.   Eyes: Conjunctivae and EOM are normal. Pupils are equal, round, and reactive to light. Right eye exhibits no discharge. Left eye exhibits no discharge.   Neck: Normal range of motion. Neck supple. No tracheal deviation present. No thyromegaly present.   Cardiovascular: Normal rate, regular rhythm, S1 normal, S2 normal, normal heart sounds and normal pulses.  Exam reveals no S3.    Pulmonary/Chest: Effort normal and breath sounds normal. No stridor. No respiratory distress. He exhibits no tenderness.   Abdominal: Soft. Bowel sounds are normal. He exhibits no distension and no mass. There is no tenderness. There is no rebound and no guarding.   Musculoskeletal: Normal range of motion. He exhibits no tenderness or deformity.   Lymphadenopathy:     He has no cervical adenopathy.   Neurological: He is alert and oriented to " person, place, and time. He has normal reflexes.   Skin: Skin is warm and dry. No rash noted. He is not diaphoretic. No erythema.   Psychiatric: He has a normal mood and affect. Thought content normal.       Lab Review:             Performed        Assessment:          Diagnosis Plan   1. Essential hypertension  ECG 12 Lead   2. History of cardiomyopathy  ECG 12 Lead   3. Ulcerative pancolitis without complication     4. SEBASTIAN (obstructive sleep apnea)            Plan:       Area hypertension-continue current medical regimen  2.  History: Myopathy with recovery of function-continue current medical regimen, doing well  3.  Obstructive sleep apnea on CPAP  4.  Diagnosed with ulcerative colitis.    Thank you very much for allowing us to participate in the care of this pleasant patient.  Please don't hesitate to call if I can be of assistance in any way.      Current Outpatient Prescriptions:   •  clonazePAM (KlonoPIN) 0.5 MG tablet, Take 0.5 mg by mouth 2 (Two) Times a Day As Needed for Seizures., Disp: , Rfl:   •  carvedilol (COREG) 3.125 MG tablet, TAKE 1 TABLET BY MOUTH 2 TIMES A DAY AS DIRECTED., Disp: 180 tablet, Rfl: 0  •  losartan (COZAAR) 50 MG tablet, TAKE 1/2 TABLET EVERY DAY., Disp: 45 tablet, Rfl: 0  •  mesalamine (LIALDA) 1.2 g EC tablet, Take 1 tablet by mouth 4 (Four) Times a Day., Disp: 120 tablet, Rfl: 4  •  pentoxifylline (TRENtal) 400 MG CR tablet, , Disp: , Rfl:   •  spironolactone (ALDACTONE) 25 MG tablet, TAKE 1 TABLET BY MOUTH DAILY., Disp: 90 tablet, Rfl: 1  •  traZODone (DESYREL) 100 MG tablet, Take 100 mg by mouth Every Night., Disp: , Rfl:   •  zolpidem CR (AMBIEN CR) 12.5 MG CR tablet, Take  by mouth., Disp: , Rfl:          EMR Dragon/Transcription disclaimer:    Much of this encounter note is an electronic transcription/translation of spoken language to printed text. The electronic translation of spoken language may permit erroneous, or at times, nonsensical words or phrases to be  inadvertently transcribed; Although I have reviewed the note for such errors, some may still exist.

## 2018-04-02 RX ORDER — LOSARTAN POTASSIUM 50 MG/1
TABLET ORAL
Qty: 45 TABLET | Refills: 1 | Status: SHIPPED | OUTPATIENT
Start: 2018-04-02 | End: 2018-10-01 | Stop reason: SDUPTHER

## 2018-04-03 RX ORDER — CARVEDILOL 3.12 MG/1
TABLET ORAL
Qty: 180 TABLET | Refills: 2 | Status: SHIPPED | OUTPATIENT
Start: 2018-04-03 | End: 2018-11-17 | Stop reason: SDUPTHER

## 2018-04-04 RX ORDER — MESALAMINE 1.2 G/1
1200 TABLET, DELAYED RELEASE ORAL 4 TIMES DAILY
Qty: 360 TABLET | Refills: 3 | Status: SHIPPED | OUTPATIENT
Start: 2018-04-04 | End: 2019-04-09 | Stop reason: SDUPTHER

## 2018-04-24 ENCOUNTER — OFFICE VISIT (OUTPATIENT)
Dept: GASTROENTEROLOGY | Facility: CLINIC | Age: 63
End: 2018-04-24

## 2018-04-24 VITALS
HEIGHT: 69 IN | WEIGHT: 175.2 LBS | BODY MASS INDEX: 25.95 KG/M2 | SYSTOLIC BLOOD PRESSURE: 128 MMHG | DIASTOLIC BLOOD PRESSURE: 70 MMHG

## 2018-04-24 DIAGNOSIS — K51.00 ULCERATIVE PANCOLITIS WITHOUT COMPLICATION (HCC): Primary | ICD-10-CM

## 2018-04-24 PROCEDURE — 99213 OFFICE O/P EST LOW 20 MIN: CPT | Performed by: INTERNAL MEDICINE

## 2018-04-24 NOTE — PROGRESS NOTES
PATIENT INFORMATION  Teofilo Jade       - 1955    CHIEF COMPLAINT  Chief Complaint   Patient presents with   • Ulcerative Colitis     follow up       HISTORY OF PRESENT ILLNESS  Ulcerative Colitis   Associated symptoms include fatigue.     He is here today in follow up and is doing well. He is on lialda 4 pills daily. bm are once daily without blood. No abdominal pain.   No new issues.        REVIEW OF SYSTEMS  Review of Systems   Constitutional: Positive for fatigue.   All other systems reviewed and are negative.        ACTIVE PROBLEMS  Patient Active Problem List    Diagnosis   • History of cardiomyopathy [Z86.79]   • Ulcerative pancolitis without complication [K51.00]   • Rectal bleeding [K62.5]     Overview Note:     Added automatically from request for surgery [id number]     • Generalized abdominal pain [R10.84]     Overview Note:     Added automatically from request for surgery [id number]     • Diarrhea [R19.7]     Overview Note:     Added automatically from request for surgery [id number]     • Hypertension [I10]   • SEBASTIAN (obstructive sleep apnea) [G47.33]         PAST MEDICAL HISTORY  Past Medical History:   Diagnosis Date   • Anxiety    • Colon polyp    • History of cardiomyopathy 3/28/2018   • Hypertension    • Nonischemic cardiomyopathy    • SEBASTIAN (obstructive sleep apnea)    • Ulcer of abdomen wall          SURGICAL HISTORY  Past Surgical History:   Procedure Laterality Date   • COLONOSCOPY     • COLONOSCOPY N/A 10/2/2017    Procedure: COLONOSCOPY, biopsy; polypectomy;  Surgeon: Sushila Amezquita MD;  Location: [facility] OR;  Service:    • KNEE SURGERY  2002    both knee         FAMILY HISTORY  Family History   Problem Relation Age of Onset   • Heart disease Mother    • Diabetes Mother    • Stroke Mother    • Hypertension Mother    • Colon polyps Mother    • Heart disease Father    • Diabetes Father          SOCIAL HISTORY  Social History     Occupational History   • Not on file.     Social History Main  "Topics   • Smoking status: Former Smoker   • Smokeless tobacco: Never Used      Comment: caffine 3 cups daily - quit 5 yrs ago   • Alcohol use Yes      Comment: occ    • Drug use: No   • Sexual activity: Defer         CURRENT MEDICATIONS    Current Outpatient Prescriptions:   •  carvedilol (COREG) 3.125 MG tablet, TAKE 1 TABLET BY MOUTH 2 TIMES A DAY AS DIRECTED., Disp: 180 tablet, Rfl: 2  •  clonazePAM (KlonoPIN) 0.5 MG tablet, Take 0.5 mg by mouth 2 (Two) Times a Day As Needed for Seizures., Disp: , Rfl:   •  losartan (COZAAR) 50 MG tablet, TAKE 1/2 TABLET EVERY DAY., Disp: 45 tablet, Rfl: 1  •  mesalamine (LIALDA) 1.2 g EC tablet, Take 1 tablet by mouth 4 (Four) Times a Day., Disp: 360 tablet, Rfl: 3  •  pentoxifylline (TRENtal) 400 MG CR tablet, , Disp: , Rfl:   •  spironolactone (ALDACTONE) 25 MG tablet, TAKE 1 TABLET BY MOUTH DAILY., Disp: 90 tablet, Rfl: 1  •  traZODone (DESYREL) 100 MG tablet, Take 100 mg by mouth Every Night., Disp: , Rfl:   •  zolpidem CR (AMBIEN CR) 12.5 MG CR tablet, Take  by mouth., Disp: , Rfl:     ALLERGIES  Review of patient's allergies indicates no known allergies.    VITALS  Vitals:    04/24/18 1305   BP: 128/70   Weight: 79.5 kg (175 lb 3.2 oz)   Height: 174 cm (68.5\")       LAST RESULTS   Office Visit on 11/28/2017   Component Date Value Ref Range Status   • Glucose 11/28/2017 102* 65 - 99 mg/dL Final   • BUN 11/28/2017 13  8 - 23 mg/dL Final   • Creatinine 11/28/2017 1.00  0.76 - 1.27 mg/dL Final   • Sodium 11/28/2017 140  136 - 145 mmol/L Final   • Potassium 11/28/2017 4.0  3.5 - 5.2 mmol/L Final   • Chloride 11/28/2017 102  98 - 107 mmol/L Final   • CO2 11/28/2017 29.2* 22.0 - 29.0 mmol/L Final   • Calcium 11/28/2017 8.5* 8.8 - 10.5 mg/dL Final   • eGFR Non African Amer 11/28/2017 76  >60 mL/min/1.73 Final   • BUN/Creatinine Ratio 11/28/2017 13.0  7.0 - 25.0 Final   • Anion Gap 11/28/2017 8.8  mmol/L Final     No results found.    PHYSICAL EXAM  Physical Exam   Constitutional: " He is oriented to person, place, and time. He appears well-developed and well-nourished. No distress.   HENT:   Head: Normocephalic and atraumatic.   Eyes: EOM are normal. Pupils are equal, round, and reactive to light.   Neck: Neck supple. No tracheal deviation present.   Cardiovascular: Normal rate, regular rhythm, normal heart sounds and intact distal pulses.  Exam reveals no gallop and no friction rub.    No murmur heard.  Pulmonary/Chest: Effort normal and breath sounds normal. No respiratory distress. He has no wheezes. He has no rales. He exhibits no tenderness.   Abdominal: Soft. Bowel sounds are normal. He exhibits no distension. There is no tenderness. There is no rebound and no guarding.   Musculoskeletal: He exhibits no edema.   Lymphadenopathy:     He has no cervical adenopathy.   Neurological: He is alert and oriented to person, place, and time.   Skin: Skin is warm. He is not diaphoretic. No erythema.   Psychiatric: He has a normal mood and affect. His behavior is normal. Judgment and thought content normal.   Nursing note and vitals reviewed.      ASSESSMENT  Diagnoses and all orders for this visit:    Ulcerative pancolitis without complication          PLAN  Return in about 6 months (around 10/24/2018).      See back in 6 months, sooner if he is having any new issues.

## 2018-05-21 RX ORDER — SPIRONOLACTONE 25 MG/1
TABLET ORAL
Qty: 90 TABLET | Refills: 1 | Status: SHIPPED | OUTPATIENT
Start: 2018-05-21 | End: 2018-11-17 | Stop reason: SDUPTHER

## 2018-09-11 ENCOUNTER — OFFICE VISIT (OUTPATIENT)
Dept: GASTROENTEROLOGY | Facility: CLINIC | Age: 63
End: 2018-09-11

## 2018-09-11 ENCOUNTER — APPOINTMENT (OUTPATIENT)
Dept: LAB | Facility: HOSPITAL | Age: 63
End: 2018-09-11
Attending: INTERNAL MEDICINE

## 2018-09-11 VITALS — WEIGHT: 172.4 LBS | HEIGHT: 69 IN | BODY MASS INDEX: 25.53 KG/M2

## 2018-09-11 DIAGNOSIS — K51.00 ULCERATIVE PANCOLITIS WITHOUT COMPLICATION (HCC): Primary | ICD-10-CM

## 2018-09-11 LAB
ANION GAP SERPL CALCULATED.3IONS-SCNC: 11 MMOL/L
BUN BLD-MCNC: 11 MG/DL (ref 8–23)
BUN/CREAT SERPL: 11.7 (ref 7–25)
CALCIUM SPEC-SCNC: 8.9 MG/DL (ref 8.8–10.5)
CHLORIDE SERPL-SCNC: 101 MMOL/L (ref 98–107)
CO2 SERPL-SCNC: 27 MMOL/L (ref 22–29)
CREAT BLD-MCNC: 0.94 MG/DL (ref 0.76–1.27)
GFR SERPL CREATININE-BSD FRML MDRD: 82 ML/MIN/1.73
GLUCOSE BLD-MCNC: 94 MG/DL (ref 65–99)
POTASSIUM BLD-SCNC: 4.2 MMOL/L (ref 3.5–5.2)
SODIUM BLD-SCNC: 139 MMOL/L (ref 136–145)

## 2018-09-11 PROCEDURE — 36415 COLL VENOUS BLD VENIPUNCTURE: CPT | Performed by: INTERNAL MEDICINE

## 2018-09-11 PROCEDURE — 99213 OFFICE O/P EST LOW 20 MIN: CPT | Performed by: INTERNAL MEDICINE

## 2018-09-11 PROCEDURE — 80048 BASIC METABOLIC PNL TOTAL CA: CPT | Performed by: INTERNAL MEDICINE

## 2018-09-11 RX ORDER — LORAZEPAM 1 MG/1
1 TABLET ORAL 2 TIMES DAILY
COMMUNITY
Start: 2018-08-15

## 2018-09-11 NOTE — PROGRESS NOTES
PATIENT INFORMATION  Teofilo Jade       - 1955    CHIEF COMPLAINT  Chief Complaint   Patient presents with   • Abdominal Pain       HISTORY OF PRESENT ILLNESS  HPI  He is here today in follow up and is doing well. He is on lialda  4 pills daily. No blood in the stool.   He has been having left flank pain for about 2 weeks. No hematuria or dysuria.    REVIEW OF SYSTEMS  Review of Systems   Constitutional: Positive for appetite change and fatigue.   HENT: Positive for tinnitus.    Respiratory: Positive for apnea.    Gastrointestinal: Positive for abdominal pain.   Musculoskeletal: Positive for back pain.   Allergic/Immunologic: Positive for food allergies.   All other systems reviewed and are negative.        ACTIVE PROBLEMS  Patient Active Problem List    Diagnosis   • History of cardiomyopathy [Z86.79]   • Ulcerative pancolitis without complication (CMS/HCC) [K51.00]   • Rectal bleeding [K62.5]     Overview Note:     Added automatically from request for surgery [id number]     • Generalized abdominal pain [R10.84]     Overview Note:     Added automatically from request for surgery [id number]     • Diarrhea [R19.7]     Overview Note:     Added automatically from request for surgery [id number]     • Hypertension [I10]   • SEBASTIAN (obstructive sleep apnea) [G47.33]         PAST MEDICAL HISTORY  Past Medical History:   Diagnosis Date   • Anxiety    • Colon polyp    • History of cardiomyopathy 3/28/2018   • Hypertension    • Nonischemic cardiomyopathy (CMS/HCC)    • SEBASTIAN (obstructive sleep apnea)    • Ulcer of abdomen wall (CMS/HCC)          SURGICAL HISTORY  Past Surgical History:   Procedure Laterality Date   • COLONOSCOPY     • COLONOSCOPY N/A 10/2/2017    Procedure: COLONOSCOPY, biopsy; polypectomy;  Surgeon: Sushila Amezquita MD;  Location: [facility];  Service:    • KNEE SURGERY  2002    both knee         FAMILY HISTORY  Family History   Problem Relation Age of Onset   • Heart disease Mother    • Diabetes Mother    •  "Stroke Mother    • Hypertension Mother    • Colon polyps Mother    • Heart disease Father    • Diabetes Father          SOCIAL HISTORY  Social History     Occupational History   • Not on file.     Social History Main Topics   • Smoking status: Former Smoker   • Smokeless tobacco: Never Used      Comment: caffine 3 cups daily - quit 5 yrs ago   • Alcohol use Yes      Comment: occ    • Drug use: No   • Sexual activity: Defer         CURRENT MEDICATIONS    Current Outpatient Prescriptions:   •  carvedilol (COREG) 3.125 MG tablet, TAKE 1 TABLET BY MOUTH 2 TIMES A DAY AS DIRECTED., Disp: 180 tablet, Rfl: 2  •  clonazePAM (KlonoPIN) 0.5 MG tablet, Take 0.5 mg by mouth 2 (Two) Times a Day As Needed for Seizures., Disp: , Rfl:   •  LORazepam (ATIVAN) 1 MG tablet, , Disp: , Rfl:   •  losartan (COZAAR) 50 MG tablet, TAKE 1/2 TABLET EVERY DAY., Disp: 45 tablet, Rfl: 1  •  mesalamine (LIALDA) 1.2 g EC tablet, Take 1 tablet by mouth 4 (Four) Times a Day., Disp: 360 tablet, Rfl: 3  •  pentoxifylline (TRENtal) 400 MG CR tablet, , Disp: , Rfl:   •  spironolactone (ALDACTONE) 25 MG tablet, TAKE 1 TABLET BY MOUTH DAILY., Disp: 90 tablet, Rfl: 1  •  traZODone (DESYREL) 100 MG tablet, Take 100 mg by mouth Every Night., Disp: , Rfl:   •  zolpidem CR (AMBIEN CR) 12.5 MG CR tablet, Take  by mouth., Disp: , Rfl:     ALLERGIES  Patient has no known allergies.    VITALS  Vitals:    09/11/18 1332   Weight: 78.2 kg (172 lb 6.4 oz)   Height: 174 cm (68.5\")       LAST RESULTS   Office Visit on 11/28/2017   Component Date Value Ref Range Status   • Glucose 11/28/2017 102* 65 - 99 mg/dL Final   • BUN 11/28/2017 13  8 - 23 mg/dL Final   • Creatinine 11/28/2017 1.00  0.76 - 1.27 mg/dL Final   • Sodium 11/28/2017 140  136 - 145 mmol/L Final   • Potassium 11/28/2017 4.0  3.5 - 5.2 mmol/L Final   • Chloride 11/28/2017 102  98 - 107 mmol/L Final   • CO2 11/28/2017 29.2* 22.0 - 29.0 mmol/L Final   • Calcium 11/28/2017 8.5* 8.8 - 10.5 mg/dL Final   • eGFR " Non  Amer 11/28/2017 76  >60 mL/min/1.73 Final   • BUN/Creatinine Ratio 11/28/2017 13.0  7.0 - 25.0 Final   • Anion Gap 11/28/2017 8.8  mmol/L Final     No results found.    PHYSICAL EXAM  Physical Exam   Constitutional: He is oriented to person, place, and time. He appears well-developed and well-nourished. No distress.   HENT:   Head: Normocephalic and atraumatic.   Eyes: Pupils are equal, round, and reactive to light. EOM are normal.   Neck: Neck supple. No tracheal deviation present.   Cardiovascular: Normal rate, regular rhythm, normal heart sounds and intact distal pulses.  Exam reveals no gallop and no friction rub.    No murmur heard.  Pulmonary/Chest: Effort normal and breath sounds normal. No respiratory distress. He has no wheezes. He has no rales. He exhibits no tenderness.   Abdominal: Soft. Bowel sounds are normal. He exhibits no distension. There is no tenderness. There is no rebound and no guarding.   Musculoskeletal: He exhibits no edema.   No cva tenderness. He does have some tenderness over the back muscles, no rashes    Lymphadenopathy:     He has no cervical adenopathy.   Neurological: He is alert and oriented to person, place, and time.   Skin: Skin is warm. He is not diaphoretic. No erythema.   Psychiatric: He has a normal mood and affect. His behavior is normal. Judgment and thought content normal.   Nursing note and vitals reviewed.      ASSESSMENT  Diagnoses and all orders for this visit:    Ulcerative pancolitis without complication (CMS/HCC)  -     Basic Metabolic Panel    Other orders  -     LORazepam (ATIVAN) 1 MG tablet;           PLAN  Return in about 6 months (around 3/11/2019).

## 2018-10-01 RX ORDER — LOSARTAN POTASSIUM 50 MG/1
TABLET ORAL
Qty: 45 TABLET | Refills: 2 | Status: SHIPPED | OUTPATIENT
Start: 2018-10-01 | End: 2019-07-01 | Stop reason: SDUPTHER

## 2018-11-19 RX ORDER — CARVEDILOL 3.12 MG/1
TABLET ORAL
Qty: 180 TABLET | Refills: 1 | Status: SHIPPED | OUTPATIENT
Start: 2018-11-19 | End: 2019-07-01 | Stop reason: SDUPTHER

## 2018-11-19 RX ORDER — SPIRONOLACTONE 25 MG/1
TABLET ORAL
Qty: 90 TABLET | Refills: 1 | Status: SHIPPED | OUTPATIENT
Start: 2018-11-19 | End: 2019-05-20 | Stop reason: SDUPTHER

## 2019-04-09 ENCOUNTER — OFFICE VISIT (OUTPATIENT)
Dept: GASTROENTEROLOGY | Facility: CLINIC | Age: 64
End: 2019-04-09

## 2019-04-09 VITALS
DIASTOLIC BLOOD PRESSURE: 72 MMHG | HEIGHT: 69 IN | SYSTOLIC BLOOD PRESSURE: 126 MMHG | BODY MASS INDEX: 25.18 KG/M2 | WEIGHT: 170 LBS

## 2019-04-09 DIAGNOSIS — K51.00 ULCERATIVE PANCOLITIS WITHOUT COMPLICATION (HCC): Primary | ICD-10-CM

## 2019-04-09 PROCEDURE — 99213 OFFICE O/P EST LOW 20 MIN: CPT | Performed by: INTERNAL MEDICINE

## 2019-04-09 RX ORDER — MESALAMINE 1000 MG/1
1000 SUPPOSITORY RECTAL NIGHTLY
Qty: 42 SUPPOSITORY | Refills: 1 | Status: SHIPPED | OUTPATIENT
Start: 2019-04-09 | End: 2019-05-22

## 2019-04-09 RX ORDER — MESALAMINE 1.2 G/1
4.8 TABLET, DELAYED RELEASE ORAL 4 TIMES DAILY
Qty: 360 TABLET | Refills: 3 | Status: SHIPPED | OUTPATIENT
Start: 2019-04-09 | End: 2019-04-12 | Stop reason: SDUPTHER

## 2019-04-09 NOTE — PROGRESS NOTES
PATIENT INFORMATION  Teofilo Jade       - 1955    CHIEF COMPLAINT  Chief Complaint   Patient presents with   • Diarrhea   • Rectal Bleeding       HISTORY OF PRESENT ILLNESS  HPI    He is here today in follow up and doing well. He notices blood in the stool about once monthly, lasts 2 days. Stools are formed, no diarrhea. No rectal pain or urgency. This has been ongoing for about almost a year, although he did not mention this to me 6 months ago.  He is on lialda 4 tablets daily.  Weight has been stable. No fever or chills  bm are once daily.      REVIEW OF SYSTEMS  Review of Systems   Constitutional: Positive for fatigue.   HENT: Positive for tinnitus.    Respiratory: Positive for apnea.    Gastrointestinal: Positive for anal bleeding, blood in stool and diarrhea.   Allergic/Immunologic: Positive for food allergies.   All other systems reviewed and are negative.        ACTIVE PROBLEMS  Patient Active Problem List    Diagnosis   • History of cardiomyopathy [Z86.79]   • Ulcerative pancolitis without complication (CMS/HCC) [K51.00]   • Rectal bleeding [K62.5]   • Generalized abdominal pain [R10.84]   • Diarrhea [R19.7]   • Hypertension [I10]   • SEBASTIAN (obstructive sleep apnea) [G47.33]         PAST MEDICAL HISTORY  Past Medical History:   Diagnosis Date   • Anxiety    • Colon polyp    • History of cardiomyopathy 3/28/2018   • Hypertension    • Nonischemic cardiomyopathy (CMS/HCC)    • SEBASTIAN (obstructive sleep apnea)    • Ulcer of abdomen wall (CMS/HCC)          SURGICAL HISTORY  Past Surgical History:   Procedure Laterality Date   • COLONOSCOPY     • COLONOSCOPY N/A 10/2/2017    Procedure: COLONOSCOPY, biopsy; polypectomy;  Surgeon: Sushila Amezquita MD;  Location: [facility];  Service:    • KNEE SURGERY  2002    both knee         FAMILY HISTORY  Family History   Problem Relation Age of Onset   • Heart disease Mother    • Diabetes Mother    • Stroke Mother    • Hypertension Mother    • Colon polyps Mother    •  "Heart disease Father    • Diabetes Father          SOCIAL HISTORY  Social History     Occupational History   • Not on file   Tobacco Use   • Smoking status: Former Smoker   • Smokeless tobacco: Never Used   • Tobacco comment: caffine 3 cups daily - quit 5 yrs ago   Substance and Sexual Activity   • Alcohol use: Yes     Comment: occ    • Drug use: No   • Sexual activity: Defer       Debilities/Disabilities Identified: None    Emotional Behavior: Appropriate    CURRENT MEDICATIONS    Current Outpatient Medications:   •  carvedilol (COREG) 3.125 MG tablet, TAKE 1 TABLET BY MOUTH 2 TIMES A DAY AS DIRECTED., Disp: 180 tablet, Rfl: 1  •  clonazePAM (KlonoPIN) 0.5 MG tablet, Take 0.5 mg by mouth 2 (Two) Times a Day As Needed for Seizures., Disp: , Rfl:   •  LORazepam (ATIVAN) 1 MG tablet, , Disp: , Rfl:   •  losartan (COZAAR) 50 MG tablet, TAKE 1/2 TABLET EVERY DAY., Disp: 45 tablet, Rfl: 2  •  mesalamine (CANASA) 1000 MG suppository, Insert 1 suppository into the rectum Every Night for 42 days., Disp: 42 suppository, Rfl: 1  •  mesalamine (LIALDA) 1.2 g EC tablet, Take 4 tablets by mouth 4 (Four) Times a Day., Disp: 360 tablet, Rfl: 3  •  pentoxifylline (TRENtal) 400 MG CR tablet, , Disp: , Rfl:   •  spironolactone (ALDACTONE) 25 MG tablet, TAKE 1 TABLET BY MOUTH DAILY., Disp: 90 tablet, Rfl: 1  •  traZODone (DESYREL) 100 MG tablet, Take 100 mg by mouth Every Night., Disp: , Rfl:   •  zolpidem CR (AMBIEN CR) 12.5 MG CR tablet, Take  by mouth., Disp: , Rfl:     ALLERGIES  Patient has no known allergies.    VITALS  Vitals:    04/09/19 1301   BP: 126/72   Weight: 77.1 kg (170 lb)   Height: 174 cm (68.5\")       LAST RESULTS   Office Visit on 09/11/2018   Component Date Value Ref Range Status   • Glucose 09/11/2018 94  65 - 99 mg/dL Final   • BUN 09/11/2018 11  8 - 23 mg/dL Final   • Creatinine 09/11/2018 0.94  0.76 - 1.27 mg/dL Final   • Sodium 09/11/2018 139  136 - 145 mmol/L Final   • Potassium 09/11/2018 4.2  3.5 - 5.2 " mmol/L Final   • Chloride 09/11/2018 101  98 - 107 mmol/L Final   • CO2 09/11/2018 27.0  22.0 - 29.0 mmol/L Final   • Calcium 09/11/2018 8.9  8.8 - 10.5 mg/dL Final   • eGFR Non African Amer 09/11/2018 82  >60 mL/min/1.73 Final   • BUN/Creatinine Ratio 09/11/2018 11.7  7.0 - 25.0 Final   • Anion Gap 09/11/2018 11.0  mmol/L Final     No results found.    PHYSICAL EXAM  Physical Exam   Constitutional: He is oriented to person, place, and time. He appears well-developed and well-nourished. No distress.   HENT:   Head: Normocephalic and atraumatic.   Eyes: EOM are normal. Pupils are equal, round, and reactive to light.   Neck: Neck supple. No tracheal deviation present.   Cardiovascular: Normal rate, regular rhythm, normal heart sounds and intact distal pulses. Exam reveals no gallop and no friction rub.   No murmur heard.  Pulmonary/Chest: Effort normal and breath sounds normal. No respiratory distress. He has no wheezes. He has no rales. He exhibits no tenderness.   Abdominal: Soft. Bowel sounds are normal. He exhibits no distension. There is no tenderness. There is no rebound and no guarding.   Musculoskeletal: He exhibits no edema.   Lymphadenopathy:     He has no cervical adenopathy.   Neurological: He is alert and oriented to person, place, and time.   Skin: Skin is warm. He is not diaphoretic. No erythema.   Psychiatric: He has a normal mood and affect. His behavior is normal. Judgment and thought content normal.   Nursing note and vitals reviewed.      ASSESSMENT  Diagnoses and all orders for this visit:    Ulcerative pancolitis without complication (CMS/HCC)    Other orders  -     mesalamine (CANASA) 1000 MG suppository; Insert 1 suppository into the rectum Every Night for 42 days.  -     mesalamine (LIALDA) 1.2 g EC tablet; Take 4 tablets by mouth 4 (Four) Times a Day.          PLAN  No Follow-up on file.    rtc in 6 months.

## 2019-04-12 RX ORDER — MESALAMINE 1.2 G/1
4.8 TABLET, DELAYED RELEASE ORAL DAILY
Qty: 360 TABLET | Refills: 3 | Status: SHIPPED | OUTPATIENT
Start: 2019-04-12 | End: 2019-10-08 | Stop reason: SDUPTHER

## 2019-05-20 RX ORDER — SPIRONOLACTONE 25 MG/1
TABLET ORAL
Qty: 90 TABLET | Refills: 0 | Status: SHIPPED | OUTPATIENT
Start: 2019-05-20 | End: 2019-08-12 | Stop reason: SDUPTHER

## 2019-05-21 NOTE — PROGRESS NOTES
Subjective:     Encounter Date:05/22/2019      Patient ID: Teofilo Jade is a 63 y.o. male.    Chief Complaint: 1 year f/u, cardiomyopathy  History of Present Illness     He is a new patient to me and I have reviewed his past medical records.  He is a patient of Dr. Wolf.  He has a history of cardiomyopathy with recovery of function.  He was also diagnosed with ulcerative colitis approximately a year ago.     He has a history of cardiomyopathy, nonischemic, with recovery of function.  His most recent echocardiogram was performed November 23, 2015:       His history dates back to 2012 when he was found to have diminished left ventricular function.  He was seen by Dr. Weiss at that time.  He had a stress test and then a cardiac catheterization which documented a nonischemic cardiomyopathy.  He was treated with medical therapy.  In 2014 he was seen again by Dr. Malloy and he had a Lexiscan Cardiolite performed.  No ischemia was seen in the ejection fraction was 50%.     He presents today, 5/22/2019, for his one-year follow-up.  He states over the past year he is just increasingly grown fatigued.  He has had a very stressful year with several deaths in his family.  His elderly mother had a stroke but thankfully recovered.  He questions whether his UC and some of his medications are contributing to the fatigue.  He states that he will sometimes feel his heart racing and feels like it is going to beat out of his chest, this usually happens with activity but states it is minimal activity.  He denies any shortness of air, edema, or chest pain.  He will have some positional lightheadedness.  He has SEBASTIAN and is compliant with his CPAP.  The following portions of the patient's history were reviewed and updated as appropriate: allergies, current medications, past family history, past medical history, past social history, past surgical history and problem list.    Review of Systems   Constitution: Negative for weakness and  malaise/fatigue.   HENT: Negative for congestion, hoarse voice and sore throat.    Eyes: Negative for blurred vision, double vision, photophobia, vision loss in left eye and vision loss in right eye.   Cardiovascular: Negative for chest pain, dyspnea on exertion, irregular heartbeat, leg swelling, near-syncope, orthopnea, palpitations, paroxysmal nocturnal dyspnea and syncope.   Respiratory: Negative for cough, hemoptysis, shortness of breath, sleep disturbances due to breathing, snoring, sputum production and wheezing.    Endocrine: Negative.    Hematologic/Lymphatic: Does not bruise/bleed easily.   Skin: Negative for color change, dry skin, poor wound healing and rash.   Musculoskeletal: Negative for back pain, falls, gout, joint pain, joint swelling, muscle cramps and muscle weakness.   Gastrointestinal: Negative for abdominal pain, constipation, diarrhea, dysphagia, melena, nausea and vomiting.   Neurological: Negative for excessive daytime sleepiness, dizziness, headaches, light-headedness, loss of balance, numbness, paresthesias, seizures and vertigo.   Psychiatric/Behavioral: Negative for depression and substance abuse. The patient is not nervous/anxious.        ECG 12 Lead  Date/Time: 5/22/2019 10:04 AM  Performed by: Kristyn Reich APRN  Authorized by: Kristyn Reich APRN   Comparison: compared with previous ECG from 3/28/2018  Similar to previous ECG  Rhythm: sinus rhythm  Rate: normal  Conduction: left bundle branch block  QRS axis: borderline left asix.    Clinical impression: abnormal EKG               Objective:         Physical Exam   Constitutional: He is oriented to person, place, and time. Vital signs are normal. He appears well-developed and well-nourished. No distress.   HENT:   Head: Normocephalic and atraumatic.   Right Ear: Hearing normal.   Left Ear: Hearing normal.   Eyes: Conjunctivae and lids are normal.   Neck: Normal range of motion. Neck supple. No JVD present. Carotid bruit is not  "present. No thyromegaly present.   Cardiovascular: Normal rate, regular rhythm, S1 normal, S2 normal, normal heart sounds and intact distal pulses.  PMI is not displaced.  Exam reveals no gallop.    No murmur heard.  Pulses:       Carotid pulses are 2+ on the right side, and 2+ on the left side.       Radial pulses are 2+ on the right side, and 2+ on the left side.        Dorsalis pedis pulses are 2+ on the right side, and 2+ on the left side.        Posterior tibial pulses are 2+ on the right side, and 2+ on the left side.   Pulmonary/Chest: Effort normal and breath sounds normal. No respiratory distress. He has no wheezes. He has no rhonchi. He has no rales. He exhibits no tenderness.   Abdominal: Soft. Normal appearance and bowel sounds are normal. He exhibits no distension, no abdominal bruit and no mass. There is no tenderness.   Musculoskeletal: Normal range of motion.   Exhibits no edema or deformity   Lymphadenopathy:     He has no cervical adenopathy.   Neurological: He is alert and oriented to person, place, and time. No cranial nerve deficit. Coordination and gait normal.   Oriented to person, place and time.   Skin: Skin is warm, dry and intact. No rash noted. He is not diaphoretic. No cyanosis. Nails show no clubbing.   Psychiatric: He has a normal mood and affect. His speech is normal and behavior is normal. Judgment and thought content normal. Cognition and memory are normal.     Vitals:    05/22/19 0951   BP: 116/74   BP Location: Left arm   Patient Position: Sitting   Cuff Size: Adult   Pulse: 56   SpO2: 95%   Weight: 76.7 kg (169 lb 1.6 oz)   Height: 174 cm (68.5\")           Lab Review:       Assessment:          Diagnosis Plan   1. History of cardiomyopathy  Adult Transthoracic Echo Complete W/ Cont if Necessary Per Protocol   2. Palpitations  Holter Monitor - 24 Hour    Adult Transthoracic Echo Complete W/ Cont if Necessary Per Protocol   3. Essential hypertension     4. SEBASTIAN (obstructive sleep " apnea)            Plan:         1.  History of cardiomyopathy-increasing feelings of fatigue; it is been 3 years since his last echo will recheck.  2.  Palpitations- increasing in frequency.  Will check Holter and echo  3.  Essential hypertension-controlled  4.  SEBASTIAN-compliant with CPAP    Await results of echo and Holter    RTO in 1 year with OLENA Batista      Current Outpatient Medications:   •  carvedilol (COREG) 3.125 MG tablet, TAKE 1 TABLET BY MOUTH 2 TIMES A DAY AS DIRECTED., Disp: 180 tablet, Rfl: 1  •  clonazePAM (KlonoPIN) 0.5 MG tablet, Take 0.5 mg by mouth 2 (Two) Times a Day As Needed for Seizures., Disp: , Rfl:   •  LORazepam (ATIVAN) 1 MG tablet, , Disp: , Rfl:   •  losartan (COZAAR) 50 MG tablet, TAKE 1/2 TABLET EVERY DAY., Disp: 45 tablet, Rfl: 2  •  mesalamine (LIALDA) 1.2 g EC tablet, Take 4 tablets by mouth Daily., Disp: 360 tablet, Rfl: 3  •  pentoxifylline (TRENtal) 400 MG CR tablet, , Disp: , Rfl:   •  spironolactone (ALDACTONE) 25 MG tablet, TAKE 1 TABLET BY MOUTH DAILY., Disp: 90 tablet, Rfl: 0  •  traZODone (DESYREL) 100 MG tablet, Take 100 mg by mouth Every Night., Disp: , Rfl:   •  zolpidem CR (AMBIEN CR) 12.5 MG CR tablet, Take  by mouth., Disp: , Rfl:

## 2019-05-22 ENCOUNTER — OFFICE VISIT (OUTPATIENT)
Dept: CARDIOLOGY | Facility: CLINIC | Age: 64
End: 2019-05-22

## 2019-05-22 VITALS
OXYGEN SATURATION: 95 % | WEIGHT: 169.1 LBS | DIASTOLIC BLOOD PRESSURE: 74 MMHG | HEART RATE: 56 BPM | BODY MASS INDEX: 25.04 KG/M2 | HEIGHT: 69 IN | SYSTOLIC BLOOD PRESSURE: 116 MMHG

## 2019-05-22 DIAGNOSIS — Z86.79 HISTORY OF CARDIOMYOPATHY: Primary | Chronic | ICD-10-CM

## 2019-05-22 DIAGNOSIS — R00.2 PALPITATIONS: ICD-10-CM

## 2019-05-22 DIAGNOSIS — G47.33 OSA (OBSTRUCTIVE SLEEP APNEA): Chronic | ICD-10-CM

## 2019-05-22 DIAGNOSIS — I10 ESSENTIAL HYPERTENSION: Chronic | ICD-10-CM

## 2019-05-22 PROCEDURE — 99214 OFFICE O/P EST MOD 30 MIN: CPT | Performed by: NURSE PRACTITIONER

## 2019-05-22 PROCEDURE — 93000 ELECTROCARDIOGRAM COMPLETE: CPT | Performed by: NURSE PRACTITIONER

## 2019-05-29 ENCOUNTER — HOSPITAL ENCOUNTER (OUTPATIENT)
Dept: CARDIOLOGY | Facility: HOSPITAL | Age: 64
Discharge: HOME | End: 2019-05-29
Admitting: NURSE PRACTITIONER

## 2019-05-29 ENCOUNTER — HOSPITAL ENCOUNTER (OUTPATIENT)
Dept: CARDIOLOGY | Facility: HOSPITAL | Age: 64
Discharge: HOME | End: 2019-05-29

## 2019-05-29 VITALS — BODY MASS INDEX: 25.61 KG/M2 | WEIGHT: 169 LBS | HEIGHT: 68 IN

## 2019-05-29 DIAGNOSIS — R00.2 PALPITATIONS: ICD-10-CM

## 2019-05-29 DIAGNOSIS — Z86.79 HISTORY OF CARDIOMYOPATHY: ICD-10-CM

## 2019-05-29 LAB
BH CV ECHO MEAS - ACS: 1.8 CM
BH CV ECHO MEAS - AO MAX PG (FULL): 1.6 MMHG
BH CV ECHO MEAS - AO MAX PG: 4.5 MMHG
BH CV ECHO MEAS - AO MEAN PG (FULL): 1 MMHG
BH CV ECHO MEAS - AO MEAN PG: 2 MMHG
BH CV ECHO MEAS - AO ROOT AREA (BSA CORRECTED): 1.6
BH CV ECHO MEAS - AO ROOT AREA: 7.3 CM^2
BH CV ECHO MEAS - AO ROOT DIAM: 3.1 CM
BH CV ECHO MEAS - AO V2 MAX: 106 CM/SEC
BH CV ECHO MEAS - AO V2 MEAN: 62 CM/SEC
BH CV ECHO MEAS - AO V2 VTI: 19.6 CM
BH CV ECHO MEAS - AVA(I,A): 3.1 CM^2
BH CV ECHO MEAS - AVA(I,D): 3.1 CM^2
BH CV ECHO MEAS - AVA(V,A): 3 CM^2
BH CV ECHO MEAS - AVA(V,D): 3 CM^2
BH CV ECHO MEAS - BSA(HAYCOCK): 1.9 M^2
BH CV ECHO MEAS - BSA: 1.9 M^2
BH CV ECHO MEAS - BZI_BMI: 25.7 KILOGRAMS/M^2
BH CV ECHO MEAS - BZI_METRIC_HEIGHT: 172.7 CM
BH CV ECHO MEAS - BZI_METRIC_WEIGHT: 76.7 KG
BH CV ECHO MEAS - EDV(CUBED): 110.6 ML
BH CV ECHO MEAS - EDV(MOD-SP2): 80.5 ML
BH CV ECHO MEAS - EDV(MOD-SP4): 109 ML
BH CV ECHO MEAS - EDV(TEICH): 107.5 ML
BH CV ECHO MEAS - EF(CUBED): 60.2 %
BH CV ECHO MEAS - EF(MOD-BP): 55 %
BH CV ECHO MEAS - EF(MOD-SP2): 38.3 %
BH CV ECHO MEAS - EF(MOD-SP4): 59.4 %
BH CV ECHO MEAS - EF(TEICH): 51.7 %
BH CV ECHO MEAS - ESV(CUBED): 44 ML
BH CV ECHO MEAS - ESV(MOD-SP2): 49.7 ML
BH CV ECHO MEAS - ESV(MOD-SP4): 44.3 ML
BH CV ECHO MEAS - ESV(TEICH): 51.9 ML
BH CV ECHO MEAS - FS: 26.5 %
BH CV ECHO MEAS - IVS/LVPW: 1.1
BH CV ECHO MEAS - IVSD: 1.1 CM
BH CV ECHO MEAS - LA DIMENSION: 3.3 CM
BH CV ECHO MEAS - LA/AO: 1.1
BH CV ECHO MEAS - LAT PEAK E' VEL: 8 CM/SEC
BH CV ECHO MEAS - LV DIASTOLIC VOL/BSA (35-75): 57.3 ML/M^2
BH CV ECHO MEAS - LV MASS(C)D: 190.3 GRAMS
BH CV ECHO MEAS - LV MASS(C)DI: 100 GRAMS/M^2
BH CV ECHO MEAS - LV MAX PG: 2.9 MMHG
BH CV ECHO MEAS - LV MEAN PG: 1 MMHG
BH CV ECHO MEAS - LV SYSTOLIC VOL/BSA (12-30): 23.3 ML/M^2
BH CV ECHO MEAS - LV V1 MAX: 85 CM/SEC
BH CV ECHO MEAS - LV V1 MEAN: 51.6 CM/SEC
BH CV ECHO MEAS - LV V1 VTI: 16 CM
BH CV ECHO MEAS - LVIDD: 4.8 CM
BH CV ECHO MEAS - LVIDS: 3.5 CM
BH CV ECHO MEAS - LVLD AP2: 6.9 CM
BH CV ECHO MEAS - LVLD AP4: 7.3 CM
BH CV ECHO MEAS - LVLS AP2: 6.3 CM
BH CV ECHO MEAS - LVLS AP4: 6.5 CM
BH CV ECHO MEAS - LVOT AREA (M): 3.8 CM^2
BH CV ECHO MEAS - LVOT AREA: 3.8 CM^2
BH CV ECHO MEAS - LVOT DIAM: 2.2 CM
BH CV ECHO MEAS - LVPWD: 1 CM
BH CV ECHO MEAS - MED PEAK E' VEL: 5 CM/SEC
BH CV ECHO MEAS - MV A DUR: 0.12 SEC
BH CV ECHO MEAS - MV A MAX VEL: 63.5 CM/SEC
BH CV ECHO MEAS - MV DEC SLOPE: 223 CM/SEC^2
BH CV ECHO MEAS - MV DEC TIME: 0.25 SEC
BH CV ECHO MEAS - MV E MAX VEL: 52.9 CM/SEC
BH CV ECHO MEAS - MV E/A: 0.83
BH CV ECHO MEAS - MV MAX PG: 1.9 MMHG
BH CV ECHO MEAS - MV MEAN PG: 1 MMHG
BH CV ECHO MEAS - MV P1/2T MAX VEL: 63.8 CM/SEC
BH CV ECHO MEAS - MV P1/2T: 83.8 MSEC
BH CV ECHO MEAS - MV V2 MAX: 68.9 CM/SEC
BH CV ECHO MEAS - MV V2 MEAN: 42.7 CM/SEC
BH CV ECHO MEAS - MV V2 VTI: 18.9 CM
BH CV ECHO MEAS - MVA P1/2T LCG: 3.4 CM^2
BH CV ECHO MEAS - MVA(P1/2T): 2.6 CM^2
BH CV ECHO MEAS - MVA(VTI): 3.2 CM^2
BH CV ECHO MEAS - PA ACC TIME: 0.11 SEC
BH CV ECHO MEAS - PA MAX PG (FULL): 0.8 MMHG
BH CV ECHO MEAS - PA MAX PG: 3.2 MMHG
BH CV ECHO MEAS - PA PR(ACCEL): 31.3 MMHG
BH CV ECHO MEAS - PA V2 MAX: 89.6 CM/SEC
BH CV ECHO MEAS - PI END-D VEL: 62.4 CM/SEC
BH CV ECHO MEAS - PULM A REVS DUR: 0.16 SEC
BH CV ECHO MEAS - PULM A REVS VEL: 25.7 CM/SEC
BH CV ECHO MEAS - PULM DIAS VEL: 26.2 CM/SEC
BH CV ECHO MEAS - PULM S/D: 1.2
BH CV ECHO MEAS - PULM SYS VEL: 31 CM/SEC
BH CV ECHO MEAS - PVA(V,A): 2.2 CM^2
BH CV ECHO MEAS - PVA(V,D): 2.2 CM^2
BH CV ECHO MEAS - QP/QS: 0.65
BH CV ECHO MEAS - RAP SYSTOLE: 3 MMHG
BH CV ECHO MEAS - RV MAX PG: 2.4 MMHG
BH CV ECHO MEAS - RV MEAN PG: 1 MMHG
BH CV ECHO MEAS - RV V1 MAX: 77.6 CM/SEC
BH CV ECHO MEAS - RV V1 MEAN: 44.8 CM/SEC
BH CV ECHO MEAS - RV V1 VTI: 15.5 CM
BH CV ECHO MEAS - RVOT AREA: 2.5 CM^2
BH CV ECHO MEAS - RVOT DIAM: 1.8 CM
BH CV ECHO MEAS - RVSP: 17 MMHG
BH CV ECHO MEAS - SI(AO): 75.3 ML/M^2
BH CV ECHO MEAS - SI(CUBED): 35 ML/M^2
BH CV ECHO MEAS - SI(LVOT): 32 ML/M^2
BH CV ECHO MEAS - SI(MOD-SP2): 16.2 ML/M^2
BH CV ECHO MEAS - SI(MOD-SP4): 34 ML/M^2
BH CV ECHO MEAS - SI(TEICH): 29.2 ML/M^2
BH CV ECHO MEAS - SV(AO): 143.2 ML
BH CV ECHO MEAS - SV(CUBED): 66.6 ML
BH CV ECHO MEAS - SV(LVOT): 60.8 ML
BH CV ECHO MEAS - SV(MOD-SP2): 30.8 ML
BH CV ECHO MEAS - SV(MOD-SP4): 64.7 ML
BH CV ECHO MEAS - SV(RVOT): 39.4 ML
BH CV ECHO MEAS - SV(TEICH): 55.6 ML
BH CV ECHO MEAS - TAPSE (>1.6): 1.7 CM2
BH CV ECHO MEAS - TR MAX VEL: 216 CM/SEC
BH CV ECHO MEASUREMENTS AVERAGE E/E' RATIO: 8.14
BH CV VAS BP RIGHT ARM: NORMAL MMHG
BH CV XLRA - RV BASE: 3.4 CM
BH CV XLRA - TDI S': 14 CM/SEC
LEFT ATRIUM VOLUME INDEX: 17 ML/M2
MAXIMAL PREDICTED HEART RATE: 159 BPM
STRESS TARGET HR: 135 BPM

## 2019-05-29 PROCEDURE — 93226 XTRNL ECG REC<48 HR SCAN A/R: CPT

## 2019-05-29 PROCEDURE — 93306 TTE W/DOPPLER COMPLETE: CPT

## 2019-05-29 PROCEDURE — 93225 XTRNL ECG REC<48 HRS REC: CPT

## 2019-05-29 PROCEDURE — 93306 TTE W/DOPPLER COMPLETE: CPT | Performed by: INTERNAL MEDICINE

## 2019-05-30 ENCOUNTER — TELEPHONE (OUTPATIENT)
Dept: CARDIOLOGY | Facility: CLINIC | Age: 64
End: 2019-05-30

## 2019-05-30 NOTE — TELEPHONE ENCOUNTER
----- Message from Amirah Cortes MD sent at 5/30/2019  7:53 AM EDT -----  Echo no change from prior

## 2019-05-31 PROCEDURE — 93227 XTRNL ECG REC<48 HR R&I: CPT | Performed by: INTERNAL MEDICINE

## 2019-06-04 ENCOUNTER — TELEPHONE (OUTPATIENT)
Dept: CARDIOLOGY | Facility: CLINIC | Age: 64
End: 2019-06-04

## 2019-06-04 NOTE — TELEPHONE ENCOUNTER
Kristyn Reich, [facility], Meseret [place], [place]             Please call, holter was normal, thanks

## 2019-07-01 RX ORDER — CARVEDILOL 3.12 MG/1
TABLET ORAL
Qty: 180 TABLET | Refills: 3 | Status: SHIPPED | OUTPATIENT
Start: 2019-07-01 | End: 2020-06-22

## 2019-07-01 RX ORDER — LOSARTAN POTASSIUM 50 MG/1
TABLET ORAL
Qty: 45 TABLET | Refills: 3 | Status: SHIPPED | OUTPATIENT
Start: 2019-07-01 | End: 2020-06-22

## 2019-08-12 RX ORDER — SPIRONOLACTONE 25 MG/1
TABLET ORAL
Qty: 90 TABLET | Refills: 0 | Status: SHIPPED | OUTPATIENT
Start: 2019-08-12 | End: 2019-11-11 | Stop reason: SDUPTHER

## 2019-10-08 ENCOUNTER — OFFICE VISIT (OUTPATIENT)
Dept: GASTROENTEROLOGY | Facility: CLINIC | Age: 64
End: 2019-10-08

## 2019-10-08 VITALS
DIASTOLIC BLOOD PRESSURE: 68 MMHG | HEIGHT: 68 IN | BODY MASS INDEX: 25.49 KG/M2 | SYSTOLIC BLOOD PRESSURE: 122 MMHG | WEIGHT: 168.2 LBS

## 2019-10-08 DIAGNOSIS — K51.00 ULCERATIVE PANCOLITIS WITHOUT COMPLICATION (HCC): Primary | ICD-10-CM

## 2019-10-08 PROCEDURE — 99213 OFFICE O/P EST LOW 20 MIN: CPT | Performed by: INTERNAL MEDICINE

## 2019-10-08 RX ORDER — MESALAMINE 1.2 G/1
4.8 TABLET, DELAYED RELEASE ORAL DAILY
Qty: 360 TABLET | Refills: 6 | Status: SHIPPED | OUTPATIENT
Start: 2019-10-08 | End: 2020-10-15 | Stop reason: SDUPTHER

## 2019-10-08 NOTE — PROGRESS NOTES
PATIENT INFORMATION  Teofilo Jade       - 1955    CHIEF COMPLAINT  Ulcerative colitis, occasional rectal bleeding    HISTORY OF PRESENT ILLNESS  HPI    He is here in follow up for UC and is doing well. bm are soft/loose, once daily. No abdominal pain. Blood on tp only related to eating nuts.  Weight is stable  He is on lialda 4 tablets daily.    REVIEW OF SYSTEMS  Review of Systems   Gastrointestinal: Positive for anal bleeding and blood in stool.   All other systems reviewed and are negative.        ACTIVE PROBLEMS  Patient Active Problem List    Diagnosis   • History of cardiomyopathy [Z86.79]   • Ulcerative pancolitis without complication (CMS/HCC) [K51.00]   • Rectal bleeding [K62.5]   • Generalized abdominal pain [R10.84]   • Diarrhea [R19.7]   • Hypertension [I10]   • SEBASTIAN (obstructive sleep apnea) [G47.33]         PAST MEDICAL HISTORY  Past Medical History:   Diagnosis Date   • Anxiety    • Colon polyp    • History of cardiomyopathy 3/28/2018   • Hypertension    • Nonischemic cardiomyopathy (CMS/HCC)    • SEBASTIAN (obstructive sleep apnea)    • Ulcer of abdomen wall (CMS/HCC)          SURGICAL HISTORY  Past Surgical History:   Procedure Laterality Date   • COLONOSCOPY     • COLONOSCOPY N/A 10/2/2017    Procedure: COLONOSCOPY, biopsy; polypectomy;  Surgeon: Sushila Amezquita MD;  Location: [facility] OR;  Service:    • KNEE SURGERY  2002    both knee         FAMILY HISTORY  Family History   Problem Relation Age of Onset   • Heart disease Mother    • Diabetes Mother    • Stroke Mother    • Hypertension Mother    • Colon polyps Mother    • Heart disease Father    • Diabetes Father          SOCIAL HISTORY  Social History     Occupational History   • Not on file   Tobacco Use   • Smoking status: Former Smoker   • Smokeless tobacco: Never Used   • Tobacco comment: caffine 3 cups daily - quit 5 yrs ago   Substance and Sexual Activity   • Alcohol use: Yes     Comment: occ    • Drug use: No   • Sexual activity:  "Defer       Debilities/Disabilities Identified: None    Emotional Behavior: Appropriate    CURRENT MEDICATIONS    Current Outpatient Medications:   •  mesalamine (LIALDA) 1.2 g EC tablet, Take 4 tablets by mouth Daily., Disp: 360 tablet, Rfl: 6  •  carvedilol (COREG) 3.125 MG tablet, TAKE 1 TABLET BY MOUTH 2 TIMES A DAY AS DIRECTED., Disp: 180 tablet, Rfl: 3  •  clonazePAM (KlonoPIN) 0.5 MG tablet, Take 0.5 mg by mouth 2 (Two) Times a Day As Needed for Seizures., Disp: , Rfl:   •  LORazepam (ATIVAN) 1 MG tablet, , Disp: , Rfl:   •  losartan (COZAAR) 50 MG tablet, TAKE 1/2 TABLET EVERY DAY., Disp: 45 tablet, Rfl: 3  •  pentoxifylline (TRENtal) 400 MG CR tablet, , Disp: , Rfl:   •  spironolactone (ALDACTONE) 25 MG tablet, TAKE 1 TABLET BY MOUTH DAILY., Disp: 90 tablet, Rfl: 0  •  traZODone (DESYREL) 100 MG tablet, Take 100 mg by mouth Every Night., Disp: , Rfl:   •  zolpidem CR (AMBIEN CR) 12.5 MG CR tablet, Take  by mouth., Disp: , Rfl:     ALLERGIES  Patient has no known allergies.    VITALS  Vitals:    10/08/19 1320   BP: 122/68   Weight: 76.3 kg (168 lb 3.2 oz)   Height: 172.7 cm (67.99\")       LAST RESULTS   Hospital Outpatient Visit on 05/29/2019   Component Date Value Ref Range Status   • BSA 05/29/2019 1.9  m^2 Final   • IVSd 05/29/2019 1.1  cm Final   • LVIDd 05/29/2019 4.8  cm Final   • LVIDs 05/29/2019 3.5  cm Final   • LVPWd 05/29/2019 1.0  cm Final   • IVS/LVPW 05/29/2019 1.1   Final   • FS 05/29/2019 26.5  % Final   • EDV(Teich) 05/29/2019 107.5  ml Final   • ESV(Teich) 05/29/2019 51.9  ml Final   • EF(Teich) 05/29/2019 51.7  % Final   • EDV(cubed) 05/29/2019 110.6  ml Final   • ESV(cubed) 05/29/2019 44.0  ml Final   • EF(cubed) 05/29/2019 60.2  % Final   • LV mass(C)d 05/29/2019 190.3  grams Final   • LV mass(C)dI 05/29/2019 100.0  grams/m^2 Final   • SV(Teich) 05/29/2019 55.6  ml Final   • SI(Teich) 05/29/2019 29.2  ml/m^2 Final   • SV(cubed) 05/29/2019 66.6  ml Final   • SI(cubed) 05/29/2019 35.0  " ml/m^2 Final   • Ao root diam 05/29/2019 3.1  cm Final   • Ao root area 05/29/2019 7.3  cm^2 Final   • ACS 05/29/2019 1.8  cm Final   • LA dimension 05/29/2019 3.3  cm Final   • LA/Ao 05/29/2019 1.1   Final   • LVOT diam 05/29/2019 2.2  cm Final   • LVOT area 05/29/2019 3.8  cm^2 Final   • LVOT area(traced) 05/29/2019 3.8  cm^2 Final   • RVOT diam 05/29/2019 1.8  cm Final   • RVOT area 05/29/2019 2.5  cm^2 Final   • LVLd ap4 05/29/2019 7.3  cm Final   • EDV(MOD-sp4) 05/29/2019 109.0  ml Final   • LVLs ap4 05/29/2019 6.5  cm Final   • ESV(MOD-sp4) 05/29/2019 44.3  ml Final   • EF(MOD-sp4) 05/29/2019 59.4  % Final   • LVLd ap2 05/29/2019 6.9  cm Final   • EDV(MOD-sp2) 05/29/2019 80.5  ml Final   • LVLs ap2 05/29/2019 6.3  cm Final   • ESV(MOD-sp2) 05/29/2019 49.7  ml Final   • EF(MOD-sp2) 05/29/2019 38.3  % Final   • SV(MOD-sp4) 05/29/2019 64.7  ml Final   • SI(MOD-sp4) 05/29/2019 34.0  ml/m^2 Final   • SV(MOD-sp2) 05/29/2019 30.8  ml Final   • SI(MOD-sp2) 05/29/2019 16.2  ml/m^2 Final   • Ao root area (BSA corrected) 05/29/2019 1.6   Final   • LV Cummins Vol (BSA corrected) 05/29/2019 57.3  ml/m^2 Final   • LV Sys Vol (BSA corrected) 05/29/2019 23.3  ml/m^2 Final   • MV A dur 05/29/2019 0.12  sec Final   • MV E max ana liusa 05/29/2019 52.9  cm/sec Final   • MV A max ana luisa 05/29/2019 63.5  cm/sec Final   • MV E/A 05/29/2019 0.83   Final   • MV V2 max 05/29/2019 68.9  cm/sec Final   • MV max PG 05/29/2019 1.9  mmHg Final   • MV V2 mean 05/29/2019 42.7  cm/sec Final   • MV mean PG 05/29/2019 1.0  mmHg Final   • MV V2 VTI 05/29/2019 18.9  cm Final   • MVA(VTI) 05/29/2019 3.2  cm^2 Final   • MV P1/2t max ana luisa 05/29/2019 63.8  cm/sec Final   • MV P1/2t 05/29/2019 83.8  msec Final   • MVA(P1/2t) 05/29/2019 2.6  cm^2 Final   • MV dec slope 05/29/2019 223.0  cm/sec^2 Final   • MV dec time 05/29/2019 0.25  sec Final   • Ao pk ana luisa 05/29/2019 106.0  cm/sec Final   • Ao max PG 05/29/2019 4.5  mmHg Final   • Ao max PG (full) 05/29/2019 1.6   mmHg Final   • Ao V2 mean 05/29/2019 62.0  cm/sec Final   • Ao mean PG 05/29/2019 2.0  mmHg Final   • Ao mean PG (full) 05/29/2019 1.0  mmHg Final   • Ao V2 VTI 05/29/2019 19.6  cm Final   • CHRISTINA(I,A) 05/29/2019 3.1  cm^2 Final   • CHRISTINA(I,D) 05/29/2019 3.1  cm^2 Final   • CHRISTINA(V,A) 05/29/2019 3.0  cm^2 Final   • CHRISTINA(V,D) 05/29/2019 3.0  cm^2 Final   • LV V1 max PG 05/29/2019 2.9  mmHg Final   • LV V1 mean PG 05/29/2019 1.0  mmHg Final   • LV V1 max 05/29/2019 85.0  cm/sec Final   • LV V1 mean 05/29/2019 51.6  cm/sec Final   • LV V1 VTI 05/29/2019 16.0  cm Final   • SV(Ao) 05/29/2019 143.2  ml Final   • SI(Ao) 05/29/2019 75.3  ml/m^2 Final   • SV(LVOT) 05/29/2019 60.8  ml Final   • SV(RVOT) 05/29/2019 39.4  ml Final   • SI(LVOT) 05/29/2019 32.0  ml/m^2 Final   • PA V2 max 05/29/2019 89.6  cm/sec Final   • PA max PG 05/29/2019 3.2  mmHg Final   • PA max PG (full) 05/29/2019 0.8  mmHg Final   • BH CV ECHO DAIN - PVA(V,A) 05/29/2019 2.2  cm^2 Final   • BH CV ECHO DAIN - PVA(V,D) 05/29/2019 2.2  cm^2 Final   • PA acc time 05/29/2019 0.11  sec Final   • PI end-d mustapha 05/29/2019 62.4  cm/sec Final   • RV V1 max PG 05/29/2019 2.4  mmHg Final   • RV V1 mean PG 05/29/2019 1.0  mmHg Final   • RV V1 max 05/29/2019 77.6  cm/sec Final   • RV V1 mean 05/29/2019 44.8  cm/sec Final   • RV V1 VTI 05/29/2019 15.5  cm Final   • TR max mustapha 05/29/2019 216.0  cm/sec Final   • PA pr(Accel) 05/29/2019 31.3  mmHg Final   • Pulm Sys Mustapha 05/29/2019 31.0  cm/sec Final   • Pulm Cummins Mustapha 05/29/2019 26.2  cm/sec Final   • Pulm S/D 05/29/2019 1.2   Final   • Qp/Qs 05/29/2019 0.65   Final   • Pulm A Revs Dur 05/29/2019 0.16  sec Final   • Pulm A Revs Mustapha 05/29/2019 25.7  cm/sec Final   • MVA P1/2T LCG 05/29/2019 3.4  cm^2 Final   • BH CV ECHO DAIN - BZI_BMI 05/29/2019 25.7  kilograms/m^2 Final   • BH CV ECHO DAIN - BSA(HAYCOCK) 05/29/2019 1.9  m^2 Final   • BH CV ECHO DAIN - BZI_METRIC_WEIGHT 05/29/2019 76.7  kg Final   • BH CV ECHO DAIN -  BZI_METRIC_HEIGHT 05/29/2019 172.7  cm Final   • Target HR (85%) 05/29/2019 135  bpm Final   • Max. Pred. HR (100%) 05/29/2019 159  bpm Final   • BH CV VAS BP RIGHT ARM 05/29/2019 116/74  mmHg Final   • TDI S' 05/29/2019 14.00  cm/sec Final   • RV Base 05/29/2019 3.40  cm Final   • LA Volume Index 05/29/2019 17.0  mL/m2 Final   • Avg E/e' ratio 05/29/2019 8.14   Final   • EF(MOD-bp) 05/29/2019 55.0  % Final   • Lat Peak E' Mustapha 05/29/2019 8.0  cm/sec Final   • Med Peak E' Mustapha 05/29/2019 5.00  cm/sec Final   • RAP systole 05/29/2019 3  mmHg Final   • RVSP(TR) 05/29/2019 17  mmHg Final   • TAPSE (>1.6) 05/29/2019 1.70  cm2 Final     No results found.    PHYSICAL EXAM  Physical Exam   Constitutional: He is oriented to person, place, and time. He appears well-developed and well-nourished. No distress.   HENT:   Head: Normocephalic and atraumatic.   Eyes: EOM are normal. Pupils are equal, round, and reactive to light.   Neck: Neck supple. No tracheal deviation present.   Cardiovascular: Normal rate, regular rhythm, normal heart sounds and intact distal pulses. Exam reveals no gallop and no friction rub.   No murmur heard.  Pulmonary/Chest: Effort normal and breath sounds normal. No respiratory distress. He has no wheezes. He has no rales. He exhibits no tenderness.   Abdominal: Soft. Bowel sounds are normal. He exhibits no distension. There is no tenderness. There is no rebound and no guarding.   Musculoskeletal: He exhibits no edema.   Lymphadenopathy:     He has no cervical adenopathy.   Neurological: He is alert and oriented to person, place, and time.   Skin: Skin is warm. He is not diaphoretic. No erythema.   Psychiatric: He has a normal mood and affect. His behavior is normal. Judgment and thought content normal.   Nursing note and vitals reviewed.      ASSESSMENT  Diagnoses and all orders for this visit:    Ulcerative pancolitis without complication (CMS/HCC)    Other orders  -     mesalamine (LIALDA) 1.2 g EC  tablet; Take 4 tablets by mouth Daily.          PLAN  No Follow-up on file.      Will refill above  He has labs planned with pcp next week, will request they get sent here.    No flu vaccine yet, advised this  Discussed bone density when needed.

## 2019-10-16 DIAGNOSIS — K51.00 ULCERATIVE PANCOLITIS WITHOUT COMPLICATION (HCC): Primary | ICD-10-CM

## 2019-10-17 ENCOUNTER — APPOINTMENT (OUTPATIENT)
Dept: LAB | Facility: HOSPITAL | Age: 64
End: 2019-10-17

## 2019-10-17 LAB
ALBUMIN SERPL-MCNC: 4.1 G/DL (ref 3.5–5.2)
ALBUMIN/GLOB SERPL: 1.4 G/DL
ALP SERPL-CCNC: 51 U/L (ref 39–117)
ALT SERPL W P-5'-P-CCNC: 8 U/L (ref 1–41)
ANION GAP SERPL CALCULATED.3IONS-SCNC: 8.5 MMOL/L (ref 5–15)
AST SERPL-CCNC: 11 U/L (ref 1–40)
BILIRUB SERPL-MCNC: 0.4 MG/DL (ref 0.2–1.2)
BUN BLD-MCNC: 15 MG/DL (ref 8–23)
BUN/CREAT SERPL: 18.1 (ref 7–25)
CALCIUM SPEC-SCNC: 9.1 MG/DL (ref 8.6–10.5)
CHLORIDE SERPL-SCNC: 98 MMOL/L (ref 98–107)
CO2 SERPL-SCNC: 27.5 MMOL/L (ref 22–29)
CREAT BLD-MCNC: 0.83 MG/DL (ref 0.76–1.27)
DEPRECATED RDW RBC AUTO: 39.3 FL (ref 37–54)
ERYTHROCYTE [DISTWIDTH] IN BLOOD BY AUTOMATED COUNT: 12.9 % (ref 12.3–15.4)
GFR SERPL CREATININE-BSD FRML MDRD: 94 ML/MIN/1.73
GLOBULIN UR ELPH-MCNC: 2.9 GM/DL
GLUCOSE BLD-MCNC: 95 MG/DL (ref 65–99)
HCT VFR BLD AUTO: 42.1 % (ref 37.5–51)
HGB BLD-MCNC: 14.5 G/DL (ref 13–17.7)
MCH RBC QN AUTO: 29.1 PG (ref 26.6–33)
MCHC RBC AUTO-ENTMCNC: 34.4 G/DL (ref 31.5–35.7)
MCV RBC AUTO: 84.5 FL (ref 79–97)
PLATELET # BLD AUTO: 259 10*3/MM3 (ref 140–450)
PMV BLD AUTO: 9.7 FL (ref 6–12)
POTASSIUM BLD-SCNC: 4.5 MMOL/L (ref 3.5–5.2)
PROT SERPL-MCNC: 7 G/DL (ref 6–8.5)
RBC # BLD AUTO: 4.98 10*6/MM3 (ref 4.14–5.8)
SODIUM BLD-SCNC: 134 MMOL/L (ref 136–145)
WBC NRBC COR # BLD: 5.7 10*3/MM3 (ref 3.4–10.8)

## 2019-10-17 PROCEDURE — 80053 COMPREHEN METABOLIC PANEL: CPT | Performed by: INTERNAL MEDICINE

## 2019-10-17 PROCEDURE — 36415 COLL VENOUS BLD VENIPUNCTURE: CPT | Performed by: INTERNAL MEDICINE

## 2019-10-17 PROCEDURE — 85027 COMPLETE CBC AUTOMATED: CPT | Performed by: INTERNAL MEDICINE

## 2019-10-21 ENCOUNTER — TELEPHONE (OUTPATIENT)
Dept: CARDIOLOGY | Facility: CLINIC | Age: 64
End: 2019-10-21

## 2019-10-21 NOTE — TELEPHONE ENCOUNTER
Pt called and would like to know who you would recommend for a Pulmonologist in the [facility]

## 2019-11-11 RX ORDER — SPIRONOLACTONE 25 MG/1
TABLET ORAL
Qty: 90 TABLET | Refills: 0 | Status: SHIPPED | OUTPATIENT
Start: 2019-11-11 | End: 2019-12-23

## 2019-12-23 RX ORDER — SPIRONOLACTONE 25 MG/1
TABLET ORAL
Qty: 90 TABLET | Refills: 2 | Status: SHIPPED | OUTPATIENT
Start: 2019-12-23 | End: 2020-10-26

## 2020-04-15 ENCOUNTER — TELEMEDICINE (OUTPATIENT)
Dept: GASTROENTEROLOGY | Facility: CLINIC | Age: 65
End: 2020-04-15

## 2020-04-15 DIAGNOSIS — E73.9 LACTOSE INTOLERANCE: ICD-10-CM

## 2020-04-15 DIAGNOSIS — K51.00 ULCERATIVE PANCOLITIS WITHOUT COMPLICATION (HCC): Primary | ICD-10-CM

## 2020-04-15 PROCEDURE — 99213 OFFICE O/P EST LOW 20 MIN: CPT | Performed by: INTERNAL MEDICINE

## 2020-04-15 NOTE — PROGRESS NOTES
Teofilo Jade is a 64 y.o. male who presents with No chief complaint on file.  Pt seen by Video Visit    Subjective     Pt of Dr koo followed for UC    Is lactose intolerant but his BMs are regular every day. Just diagnosed in 2017.     Is to see Dr Koroma tomorrow and will get labs faxed ovwer. No ezxtrainestinal manifestations and no previous bone density- willschedule    Does give a history of PUD bleeding at age 20            Past Medical History:   Diagnosis Date   • Anxiety    • Colon polyp    • History of cardiomyopathy 3/28/2018   • Hypertension    • Nonischemic cardiomyopathy (CMS/HCC)    • SEBASTIAN (obstructive sleep apnea)    • Ulcer of abdomen wall (CMS/HCC)        Social History     Socioeconomic History   • Marital status:      Spouse name: Not on file   • Number of children: Not on file   • Years of education: Not on file   • Highest education level: Not on file   Tobacco Use   • Smoking status: Former Smoker   • Smokeless tobacco: Never Used   • Tobacco comment: caffine 3 cups daily - quit 5 yrs ago   Substance and Sexual Activity   • Alcohol use: Yes     Comment: occ    • Drug use: No   • Sexual activity: Defer         Current Outpatient Medications:   •  carvedilol (COREG) 3.125 MG tablet, TAKE 1 TABLET BY MOUTH 2 TIMES A DAY AS DIRECTED., Disp: 180 tablet, Rfl: 3  •  clonazePAM (KlonoPIN) 0.5 MG tablet, Take 0.5 mg by mouth 2 (Two) Times a Day As Needed for Seizures., Disp: , Rfl:   •  LORazepam (ATIVAN) 1 MG tablet, , Disp: , Rfl:   •  losartan (COZAAR) 50 MG tablet, TAKE 1/2 TABLET EVERY DAY., Disp: 45 tablet, Rfl: 3  •  mesalamine (LIALDA) 1.2 g EC tablet, Take 4 tablets by mouth Daily., Disp: 360 tablet, Rfl: 6  •  pentoxifylline (TRENtal) 400 MG CR tablet, , Disp: , Rfl:   •  spironolactone (ALDACTONE) 25 MG tablet, TAKE 1 TABLET BY MOUTH DAILY., Disp: 90 tablet, Rfl: 2  •  traZODone (DESYREL) 100 MG tablet, Take 100 mg by mouth Every Night., Disp: , Rfl:   •  zolpidem CR (AMBIEN CR) 12.5  MG CR tablet, Take  by mouth., Disp: , Rfl:     Review of Systems    Objective   There were no vitals filed for this visit.  There were no vitals filed for this visit.  There is no height or weight on file to calculate BMI.      Physical Exam    WBC   Date Value Ref Range Status   10/17/2019 5.70 3.40 - 10.80 10*3/mm3 Final     RBC   Date Value Ref Range Status   10/17/2019 4.98 4.14 - 5.80 10*6/mm3 Final     Hemoglobin   Date Value Ref Range Status   10/17/2019 14.5 13.0 - 17.7 g/dL Final     Hematocrit   Date Value Ref Range Status   10/17/2019 42.1 37.5 - 51.0 % Final     MCV   Date Value Ref Range Status   10/17/2019 84.5 79.0 - 97.0 fL Final     MCH   Date Value Ref Range Status   10/17/2019 29.1 26.6 - 33.0 pg Final     MCHC   Date Value Ref Range Status   10/17/2019 34.4 31.5 - 35.7 g/dL Final     RDW   Date Value Ref Range Status   10/17/2019 12.9 12.3 - 15.4 % Final     RDW-SD   Date Value Ref Range Status   10/17/2019 39.3 37.0 - 54.0 fl Final     MPV   Date Value Ref Range Status   10/17/2019 9.7 6.0 - 12.0 fL Final     Platelets   Date Value Ref Range Status   10/17/2019 259 140 - 450 10*3/mm3 Final       Lab Results   Component Value Date    GLUCOSE 95 10/17/2019    BUN 15 10/17/2019    CREATININE 0.83 10/17/2019    EGFRIFNONA 94 10/17/2019    BCR 18.1 10/17/2019    CO2 27.5 10/17/2019    CALCIUM 9.1 10/17/2019    ALBUMIN 4.10 10/17/2019    AST 11 10/17/2019    ALT 8 10/17/2019         Imaging Results (Last 7 Days)     ** No results found for the last 168 hours. **            Assessment/Plan   Diagnoses and all orders for this visit:    Ulcerative pancolitis without complication (CMS/HCC)  -     dexa bone density axial; Future    Lactose intolerance    Continue Lialda and order a DEXA scan and anticipate his labs from Dr Koroma will F/U in 1 year    Dictated utilizing Dragon dictation

## 2020-06-17 ENCOUNTER — APPOINTMENT (OUTPATIENT)
Dept: BONE DENSITY | Facility: HOSPITAL | Age: 65
End: 2020-06-17

## 2020-06-17 DIAGNOSIS — K51.00 ULCERATIVE PANCOLITIS WITHOUT COMPLICATION (HCC): ICD-10-CM

## 2020-06-17 PROCEDURE — 77080 DXA BONE DENSITY AXIAL: CPT

## 2020-06-18 ENCOUNTER — TELEPHONE (OUTPATIENT)
Dept: GASTROENTEROLOGY | Facility: CLINIC | Age: 65
End: 2020-06-18

## 2020-06-18 NOTE — TELEPHONE ENCOUNTER
I have no idea what is causing that have him call [facility] for an urgent walk in and listen to his lungs  To see if this is a pneumonia or bronchitis and his coughing is causing the vomiting????

## 2020-06-22 RX ORDER — LOSARTAN POTASSIUM 50 MG/1
TABLET ORAL
Qty: 45 TABLET | Refills: 0 | Status: SHIPPED | OUTPATIENT
Start: 2020-06-22 | End: 2020-09-14

## 2020-06-22 RX ORDER — CARVEDILOL 3.12 MG/1
TABLET ORAL
Qty: 180 TABLET | Refills: 0 | Status: SHIPPED | OUTPATIENT
Start: 2020-06-22 | End: 2020-09-14

## 2020-06-25 ENCOUNTER — OFFICE VISIT (OUTPATIENT)
Dept: GASTROENTEROLOGY | Facility: CLINIC | Age: 65
End: 2020-06-25

## 2020-06-25 ENCOUNTER — OFFICE VISIT (OUTPATIENT)
Dept: CARDIOLOGY | Facility: CLINIC | Age: 65
End: 2020-06-25

## 2020-06-25 VITALS — TEMPERATURE: 97.8 F | HEIGHT: 68 IN | BODY MASS INDEX: 26.07 KG/M2 | WEIGHT: 172 LBS

## 2020-06-25 VITALS
WEIGHT: 170 LBS | HEART RATE: 60 BPM | BODY MASS INDEX: 25.76 KG/M2 | SYSTOLIC BLOOD PRESSURE: 120 MMHG | DIASTOLIC BLOOD PRESSURE: 70 MMHG | HEIGHT: 68 IN

## 2020-06-25 DIAGNOSIS — R11.2 NON-INTRACTABLE VOMITING WITH NAUSEA, UNSPECIFIED VOMITING TYPE: ICD-10-CM

## 2020-06-25 DIAGNOSIS — I10 ESSENTIAL HYPERTENSION: Primary | Chronic | ICD-10-CM

## 2020-06-25 DIAGNOSIS — R13.10 DYSPHAGIA, UNSPECIFIED TYPE: Primary | ICD-10-CM

## 2020-06-25 DIAGNOSIS — J34.89 SINUS DRAINAGE: ICD-10-CM

## 2020-06-25 DIAGNOSIS — Z86.79 HISTORY OF CARDIOMYOPATHY: Chronic | ICD-10-CM

## 2020-06-25 PROBLEM — R11.10 NON-INTRACTABLE VOMITING: Status: ACTIVE | Noted: 2020-06-25

## 2020-06-25 PROCEDURE — 99213 OFFICE O/P EST LOW 20 MIN: CPT | Performed by: INTERNAL MEDICINE

## 2020-06-25 PROCEDURE — 99214 OFFICE O/P EST MOD 30 MIN: CPT | Performed by: NURSE PRACTITIONER

## 2020-06-25 PROCEDURE — 93000 ELECTROCARDIOGRAM COMPLETE: CPT | Performed by: INTERNAL MEDICINE

## 2020-06-25 NOTE — PROGRESS NOTES
"    PATIENT INFORMATION  Teofilo Jade       - 1955    CHIEF COMPLAINT  Chief Complaint   Patient presents with   • Difficulty Swallowing       HISTORY OF PRESENT ILLNESS  The upper GI s/s worse just in the last month or so. He was seen by Dr. Wolf this morning and \"no problems with my heart\".     He complains of worse in the morning after he takes his medicine and eats breakfast.  Starts gagging and then throws up.  Only with solids, never just with liquids.  Denies HB or reflux s/s.  Usually 30-45 min after he eats.      Only bothered him once in the middle of the night when he had eaten closer to bedtime without vomiting but HB episode x 1 about 6 months ago.  Also feels like may have some post nasal drainage that makes him feel worse.  Does not take any allergy medication and the grass definitely makes his eyes red and itchy and corresponds to when pollen allergies peaked this year.     No hx EGD,last colon in  when he was dx with UC recall in .  He takes his four mesalamine pills a day and usually has 1bm a day and \"does fine\" with no complaints except cannot eat oysters that make his UC bad so he stays away from those.     No wt loss, appetite normal.           REVIEW OF SYSTEMS  Review of Systems   HENT: Positive for trouble swallowing.    All other systems reviewed and are negative.        ACTIVE PROBLEMS  Patient Active Problem List    Diagnosis   • Dysphagia [R13.10]   • History of cardiomyopathy [Z86.79]   • Ulcerative pancolitis without complication (CMS/HCC) [K51.00]   • Rectal bleeding [K62.5]   • Generalized abdominal pain [R10.84]   • Diarrhea [R19.7]   • Hypertension [I10]   • SEBASTIAN (obstructive sleep apnea) [G47.33]         PAST MEDICAL HISTORY  Past Medical History:   Diagnosis Date   • Anxiety    • Colon polyp    • History of cardiomyopathy 3/28/2018   • Hypertension    • Nonischemic cardiomyopathy (CMS/HCC)    • SEBASTIAN (obstructive sleep apnea)    • Ulcer of abdomen wall (CMS/HCC)  " "        SURGICAL HISTORY  Past Surgical History:   Procedure Laterality Date   • COLONOSCOPY     • COLONOSCOPY N/A 10/2/2017    Procedure: COLONOSCOPY, biopsy; polypectomy;  Surgeon: Sushila Amezquita MD;  Location: [facility];  Service:    • KNEE SURGERY  2002 2003    both knee         FAMILY HISTORY  Family History   Problem Relation Age of Onset   • Heart disease Mother    • Diabetes Mother    • Stroke Mother    • Hypertension Mother    • Colon polyps Mother    • Heart disease Father    • Diabetes Father          SOCIAL HISTORY  Social History     Occupational History   • Not on file   Tobacco Use   • Smoking status: Former Smoker   • Smokeless tobacco: Never Used   • Tobacco comment: caffine 3 cups daily - quit 5 yrs ago   Substance and Sexual Activity   • Alcohol use: Yes     Comment: occ    • Drug use: No   • Sexual activity: Defer         CURRENT MEDICATIONS    Current Outpatient Medications:   •  carvedilol (COREG) 3.125 MG tablet, TAKE 1 TABLET BY MOUTH 2 TIMES A DAY AS DIRECTED., Disp: 180 tablet, Rfl: 0  •  Cetirizine HCl 10 MG capsule, Take 10 mg by mouth Every Morning., Disp: 30 capsule, Rfl: 11  •  LORazepam (ATIVAN) 1 MG tablet, Take 1 mg by mouth Daily., Disp: , Rfl:   •  losartan (COZAAR) 50 MG tablet, TAKE 1/2 TABLET EVERY DAY., Disp: 45 tablet, Rfl: 0  •  mesalamine (LIALDA) 1.2 g EC tablet, Take 4 tablets by mouth Daily., Disp: 360 tablet, Rfl: 6  •  spironolactone (ALDACTONE) 25 MG tablet, TAKE 1 TABLET BY MOUTH DAILY., Disp: 90 tablet, Rfl: 2  •  traZODone (DESYREL) 100 MG tablet, Take 100 mg by mouth Every Night., Disp: , Rfl:   •  zolpidem CR (AMBIEN CR) 12.5 MG CR tablet, Take  by mouth., Disp: , Rfl:     ALLERGIES  Patient has no known allergies.    VITALS  Vitals:    06/25/20 1059   Temp: 97.8 °F (36.6 °C)   TempSrc: Temporal   Weight: 78 kg (172 lb)   Height: 172.7 cm (67.99\")       LAST RESULTS   Orders Only on 10/16/2019   Component Date Value Ref Range Status   • WBC 10/17/2019 5.70  3.40 " - 10.80 10*3/mm3 Final   • RBC 10/17/2019 4.98  4.14 - 5.80 10*6/mm3 Final   • Hemoglobin 10/17/2019 14.5  13.0 - 17.7 g/dL Final   • Hematocrit 10/17/2019 42.1  37.5 - 51.0 % Final   • MCV 10/17/2019 84.5  79.0 - 97.0 fL Final   • MCH 10/17/2019 29.1  26.6 - 33.0 pg Final   • MCHC 10/17/2019 34.4  31.5 - 35.7 g/dL Final   • RDW 10/17/2019 12.9  12.3 - 15.4 % Final   • RDW-SD 10/17/2019 39.3  37.0 - 54.0 fl Final   • MPV 10/17/2019 9.7  6.0 - 12.0 fL Final   • Platelets 10/17/2019 259  140 - 450 10*3/mm3 Final   • Glucose 10/17/2019 95  65 - 99 mg/dL Final   • BUN 10/17/2019 15  8 - 23 mg/dL Final   • Creatinine 10/17/2019 0.83  0.76 - 1.27 mg/dL Final   • Sodium 10/17/2019 134* 136 - 145 mmol/L Final   • Potassium 10/17/2019 4.5  3.5 - 5.2 mmol/L Final   • Chloride 10/17/2019 98  98 - 107 mmol/L Final   • CO2 10/17/2019 27.5  22.0 - 29.0 mmol/L Final   • Calcium 10/17/2019 9.1  8.6 - 10.5 mg/dL Final   • Total Protein 10/17/2019 7.0  6.0 - 8.5 g/dL Final   • Albumin 10/17/2019 4.10  3.50 - 5.20 g/dL Final   • ALT (SGPT) 10/17/2019 8  1 - 41 U/L Final   • AST (SGOT) 10/17/2019 11  1 - 40 U/L Final   • Alkaline Phosphatase 10/17/2019 51  39 - 117 U/L Final   • Total Bilirubin 10/17/2019 0.4  0.2 - 1.2 mg/dL Final   • eGFR Non African Amer 10/17/2019 94  >60 mL/min/1.73 Final   • Globulin 10/17/2019 2.9  gm/dL Final   • A/G Ratio 10/17/2019 1.4  g/dL Final   • BUN/Creatinine Ratio 10/17/2019 18.1  7.0 - 25.0 Final   • Anion Gap 10/17/2019 8.5  5.0 - 15.0 mmol/L Final     Dexa Bone Density Axial    Result Date: 6/17/2020  Narrative: DXA BONE MINERAL DENSITY MEASUREMENT, 06/17/2020  INDICATION: 64-year-old male with risk factors for bone mineral density loss. Treatment for ulcerative colitis.  TECHNIQUE: DXA bone mineral density measurements were obtained at the lumbar spine and left hip.  FINDINGS: At the left hip, lowest femoral neck T score measures -1.2, indicating osteopenia.  Lumbar BMD is within normal limits.  L1-4 T score measures -0.6.      Impression: 1. Osteopenia. 2. Left femoral neck T score measures -1.2.  FRAX: *  10 year fracture risk for major osteoporotic fracture: 5.5%. *  Calculated for an untreated postmenopausal patient with osteopenia/low bone mineral density.    This report was finalized on 6/17/2020 4:23 PM by Dr. Eugene Penaloza MD.        PHYSICAL EXAM  Debilities/Disabilities Identified: None  Emotional Behavior: Appropriate  Physical Exam   Constitutional: He is oriented to person, place, and time. He appears well-developed and well-nourished.   HENT:   Head: Normocephalic and atraumatic.   Eyes: Pupils are equal, round, and reactive to light. Conjunctivae are normal.   Neck: Normal range of motion. Neck supple.   Cardiovascular: Normal rate and regular rhythm.   Pulmonary/Chest: Effort normal and breath sounds normal.   Abdominal: Soft. Bowel sounds are normal. He exhibits no distension.   Musculoskeletal: Normal range of motion.   Lymphadenopathy:     He has no cervical adenopathy.   Neurological: He is alert and oriented to person, place, and time.   Skin: Skin is warm and dry.   Psychiatric: He has a normal mood and affect. His behavior is normal.   Nursing note and vitals reviewed.      CLINICAL DATA REVIEWED   reviewed previous lab results and integrated with today's visit, reviewed notes from other physicians and/or last GI encounter, reviewed previous endoscopy results and available photos    ASSESSMENT  Diagnoses and all orders for this visit:    Dysphagia, unspecified type  -     Case Request; Standing  -     Follow Anesthesia Guidelines / Protocol; Future  -     Obtain Informed Consent; Standing  -     Case Request    Sinus drainage  -     Cetirizine HCl 10 MG capsule; Take 10 mg by mouth Every Morning.    Non-intractable vomiting with nausea, unspecified vomiting type  -     Case Request; Standing  -     Follow Anesthesia Guidelines / Protocol; Future  -     Obtain Informed Consent;  Standing  -     Case Request          PLAN  No follow-ups on file.     Discussed EGD vs upper GI study. Symptoms sound more gastric since occurring 30-45 min after meals. So recommend EGD.  Also recommend trying daily OTC Zyrtec for allergies.      I have discussed the above plan with the patient.  They verbalize understanding and are in agreement with the plan.  They have been advised to contact the office for any questions, concerns, or changes related to their health.    Seen and examined today by Dr. Vini Lorenzana and OLENA Roijas.

## 2020-06-25 NOTE — PROGRESS NOTES
Subjective:     Encounter Date:  06/25/20        Patient ID: Teofilo Jade is a 64 y.o. male.    Chief Complaint:  History of Present Illness    Dear Dr. Koroma,    This patient comes in for routine follow-up of their cardiac status.  It has been one year since his last visit. He has a history of cardiomyopathy with recovery of function.    Last year he has been doing well with no cardiac complaints.  He denies any chest pain, pressure, tightness, squeezing, or heartburn.  He has not experienced any feeling of palpitations, tachycardia or heart racing and no presyncope or syncope.  There has not been any problems with dizziness or lightheadedness.  There has not been any orthopnea or PND, and no problems with lower extremity edema.  He denies any shortness of breath at rest or with activity and has not had any wheezing.  He has not had any problems with unexplained nausea or vomiting. He has continued to perform daily activities of living without any specific problem or change in the level of activity.  He has not been recently hospitalized for any reason.    Echo 5/2019:  Interpretation Summary     · Left ventricular systolic function is normal.  · The following left ventricular wall segments are hypokinetic: apical septal, basal inferoseptal, mid inferoseptal and basal inferoseptal.  · Left ventricular wall thickness is consistent with borderline concentric hypertrophy.  · Left ventricular diastolic dysfunction (grade I) consistent with impaired relaxation.  · Calculated EF = 55.0%.        Holter 5/2019:  Interpretation Summary     · A normal monitor study.          His history dates back to 2012 when he was found to have diminished left ventricular function.  He was seen by Dr. Weiss at that time.  He had a stress test and then a cardiac catheterization which documented a nonischemic cardiomyopathy.  He was treated with medical therapy.  In 2014 he was seen again by Dr. Malloy and he had a Lexiscan Cardiolite  performed.  No ischemia was seen in the ejection fraction was 50%.    The following portions of the patient's history were reviewed and updated as appropriate: allergies, current medications, past family history, past medical history, past social history, past surgical history and problem list.    Past Medical History:   Diagnosis Date   • Anxiety    • Colon polyp    • History of cardiomyopathy 3/28/2018   • Hypertension    • Nonischemic cardiomyopathy (CMS/HCC)    • SEBASTIAN (obstructive sleep apnea)    • Ulcer of abdomen wall (CMS/HCC)        Past Surgical History:   Procedure Laterality Date   • COLONOSCOPY     • COLONOSCOPY N/A 10/2/2017    Procedure: COLONOSCOPY, biopsy; polypectomy;  Surgeon: Sushila Amezquita MD;  Location: [facility];  Service:    • KNEE SURGERY  2002 2003    both knee       Social History     Socioeconomic History   • Marital status:      Spouse name: Not on file   • Number of children: Not on file   • Years of education: Not on file   • Highest education level: Not on file   Tobacco Use   • Smoking status: Former Smoker   • Smokeless tobacco: Never Used   • Tobacco comment: caffine 3 cups daily - quit 5 yrs ago   Substance and Sexual Activity   • Alcohol use: Yes     Comment: occ    • Drug use: No   • Sexual activity: Defer       Review of Systems   Constitution: Negative for chills, decreased appetite, fever and night sweats.   HENT: Negative for ear discharge, ear pain, hearing loss, nosebleeds and sore throat.    Eyes: Negative for blurred vision, double vision and pain.   Cardiovascular: Negative for cyanosis.   Respiratory: Negative for hemoptysis and sputum production.    Endocrine: Negative for cold intolerance and heat intolerance.   Hematologic/Lymphatic: Negative for adenopathy.   Skin: Negative for dry skin, itching, nail changes, rash and suspicious lesions.   Musculoskeletal: Negative for arthritis, gout, muscle cramps, muscle weakness, myalgias and neck pain.  "  Gastrointestinal: Negative for anorexia, bowel incontinence, constipation, diarrhea, dysphagia, hematemesis and jaundice.   Genitourinary: Negative for bladder incontinence, dysuria, flank pain, frequency, hematuria and nocturia.   Neurological: Negative for focal weakness, numbness, paresthesias and seizures.   Psychiatric/Behavioral: Negative for altered mental status, hallucinations, hypervigilance, suicidal ideas and thoughts of violence.   Allergic/Immunologic: Negative for persistent infections.         ECG 12 Lead  Date/Time: 6/25/2020 11:16 AM  Performed by: Sunil Wolf III, MD  Authorized by: Sunil Wolf III, MD   Comparison: compared with previous ECG   Similar to previous ECG  Rhythm: sinus rhythm  Rate: normal  Conduction: conduction normal  ST Segments: ST segments normal  T Waves: T waves normal  QRS axis: normal  Other: no other findings    Clinical impression: normal ECG               Objective:     Vitals:    06/25/20 1030   BP: 120/70   Pulse: 60   Weight: 77.1 kg (170 lb)   Height: 172.7 cm (68\")         Physical Exam   Constitutional: He is oriented to person, place, and time. He appears well-developed and well-nourished. No distress.   HENT:   Head: Normocephalic and atraumatic.   Nose: Nose normal.   Mouth/Throat: Oropharynx is clear and moist.   Eyes: Pupils are equal, round, and reactive to light. Conjunctivae and EOM are normal. Right eye exhibits no discharge. Left eye exhibits no discharge.   Neck: Normal range of motion. Neck supple. No tracheal deviation present. No thyromegaly present.   Cardiovascular: Normal rate, regular rhythm, S1 normal, S2 normal, normal heart sounds and normal pulses. Exam reveals no S3.   Pulmonary/Chest: Effort normal and breath sounds normal. No stridor. No respiratory distress. He exhibits no tenderness.   Abdominal: Soft. Bowel sounds are normal. He exhibits no distension and no mass. There is no tenderness. There is no rebound and no guarding. "   Musculoskeletal: Normal range of motion. He exhibits no tenderness or deformity.   Lymphadenopathy:     He has no cervical adenopathy.   Neurological: He is alert and oriented to person, place, and time. He has normal reflexes.   Skin: Skin is warm and dry. No rash noted. He is not diaphoretic. No erythema.   Psychiatric: He has a normal mood and affect. Thought content normal.       Lab Review:             Performed        Assessment:          Diagnosis Plan   1. Essential hypertension  ECG 12 Lead   2. History of cardiomyopathy  ECG 12 Lead          Plan:       1.  hypertension-continue current medical regimen, excellent control  2.  History of cardiomyopathy with recovery of function-continue current medical regimen, doing well  3.  Obstructive sleep apnea on CPAP  4.  Diagnosed with ulcerative colitis.  He is having some issues after he eats and is scheduled to see Dr. Lorenzana soon    Thank you very much for allowing us to participate in the care of this pleasant patient.  Please don't hesitate to call if I can be of assistance in any way.      Current Outpatient Medications:   •  carvedilol (COREG) 3.125 MG tablet, TAKE 1 TABLET BY MOUTH 2 TIMES A DAY AS DIRECTED., Disp: 180 tablet, Rfl: 0  •  LORazepam (ATIVAN) 1 MG tablet, Take 1 mg by mouth Daily., Disp: , Rfl:   •  losartan (COZAAR) 50 MG tablet, TAKE 1/2 TABLET EVERY DAY., Disp: 45 tablet, Rfl: 0  •  mesalamine (LIALDA) 1.2 g EC tablet, Take 4 tablets by mouth Daily., Disp: 360 tablet, Rfl: 6  •  spironolactone (ALDACTONE) 25 MG tablet, TAKE 1 TABLET BY MOUTH DAILY., Disp: 90 tablet, Rfl: 2  •  traZODone (DESYREL) 100 MG tablet, Take 100 mg by mouth Every Night., Disp: , Rfl:   •  zolpidem CR (AMBIEN CR) 12.5 MG CR tablet, Take  by mouth., Disp: , Rfl:          EMR Dragon/Transcription disclaimer:    Much of this encounter note is an electronic transcription/translation of spoken language to printed text. The electronic translation of spoken language  may permit erroneous, or at times, nonsensical words or phrases to be inadvertently transcribed; Although I have reviewed the note for such errors, some may still exist.

## 2020-07-06 ENCOUNTER — TRANSCRIBE ORDERS (OUTPATIENT)
Dept: ADMINISTRATIVE | Facility: HOSPITAL | Age: 65
End: 2020-07-06

## 2020-07-06 DIAGNOSIS — Z01.818 OTHER SPECIFIED PRE-OPERATIVE EXAMINATION: Primary | ICD-10-CM

## 2020-07-08 ENCOUNTER — LAB (OUTPATIENT)
Dept: LAB | Facility: HOSPITAL | Age: 65
End: 2020-07-08

## 2020-07-08 DIAGNOSIS — Z01.818 OTHER SPECIFIED PRE-OPERATIVE EXAMINATION: ICD-10-CM

## 2020-07-08 PROCEDURE — C9803 HOPD COVID-19 SPEC COLLECT: HCPCS

## 2020-07-08 PROCEDURE — U0002 COVID-19 LAB TEST NON-CDC: HCPCS

## 2020-07-08 PROCEDURE — U0004 COV-19 TEST NON-CDC HGH THRU: HCPCS

## 2020-07-09 ENCOUNTER — ANESTHESIA EVENT (OUTPATIENT)
Dept: PERIOP | Facility: HOSPITAL | Age: 65
End: 2020-07-09

## 2020-07-09 LAB
REF LAB TEST METHOD: NORMAL
SARS-COV-2 RNA RESP QL NAA+PROBE: NOT DETECTED

## 2020-07-10 ENCOUNTER — HOSPITAL ENCOUNTER (OUTPATIENT)
Facility: HOSPITAL | Age: 65
Setting detail: HOSPITAL OUTPATIENT SURGERY
Discharge: HOME | End: 2020-07-10
Attending: INTERNAL MEDICINE | Admitting: INTERNAL MEDICINE

## 2020-07-10 ENCOUNTER — ANESTHESIA (OUTPATIENT)
Dept: PERIOP | Facility: HOSPITAL | Age: 65
End: 2020-07-10

## 2020-07-10 VITALS
WEIGHT: 172.2 LBS | OXYGEN SATURATION: 98 % | HEART RATE: 59 BPM | SYSTOLIC BLOOD PRESSURE: 120 MMHG | DIASTOLIC BLOOD PRESSURE: 79 MMHG | BODY MASS INDEX: 26.19 KG/M2 | TEMPERATURE: 98.1 F | RESPIRATION RATE: 12 BRPM

## 2020-07-10 DIAGNOSIS — R11.2 NON-INTRACTABLE VOMITING WITH NAUSEA, UNSPECIFIED VOMITING TYPE: ICD-10-CM

## 2020-07-10 DIAGNOSIS — R13.10 DYSPHAGIA, UNSPECIFIED TYPE: ICD-10-CM

## 2020-07-10 PROCEDURE — 25010000002 PROPOFOL 10 MG/ML EMULSION: Performed by: NURSE ANESTHETIST, CERTIFIED REGISTERED

## 2020-07-10 PROCEDURE — 88305 TISSUE EXAM BY PATHOLOGIST: CPT | Performed by: INTERNAL MEDICINE

## 2020-07-10 PROCEDURE — 43239 EGD BIOPSY SINGLE/MULTIPLE: CPT | Performed by: INTERNAL MEDICINE

## 2020-07-10 RX ORDER — SODIUM CHLORIDE 9 MG/ML
40 INJECTION, SOLUTION INTRAVENOUS AS NEEDED
Status: DISCONTINUED | OUTPATIENT
Start: 2020-07-10 | End: 2020-07-10 | Stop reason: HOSPADM

## 2020-07-10 RX ORDER — SODIUM CHLORIDE, SODIUM LACTATE, POTASSIUM CHLORIDE, CALCIUM CHLORIDE 600; 310; 30; 20 MG/100ML; MG/100ML; MG/100ML; MG/100ML
100 INJECTION, SOLUTION INTRAVENOUS CONTINUOUS
Status: DISCONTINUED | OUTPATIENT
Start: 2020-07-10 | End: 2020-07-10 | Stop reason: HOSPADM

## 2020-07-10 RX ORDER — LIDOCAINE HYDROCHLORIDE 20 MG/ML
INJECTION, SOLUTION INFILTRATION; PERINEURAL AS NEEDED
Status: DISCONTINUED | OUTPATIENT
Start: 2020-07-10 | End: 2020-07-10 | Stop reason: SURG

## 2020-07-10 RX ORDER — MEPERIDINE HYDROCHLORIDE 25 MG/ML
12.5 INJECTION INTRAMUSCULAR; INTRAVENOUS; SUBCUTANEOUS
Status: DISCONTINUED | OUTPATIENT
Start: 2020-07-10 | End: 2020-07-10 | Stop reason: HOSPADM

## 2020-07-10 RX ORDER — PROPOFOL 10 MG/ML
VIAL (ML) INTRAVENOUS AS NEEDED
Status: DISCONTINUED | OUTPATIENT
Start: 2020-07-10 | End: 2020-07-10 | Stop reason: SURG

## 2020-07-10 RX ORDER — SODIUM CHLORIDE 0.9 % (FLUSH) 0.9 %
10 SYRINGE (ML) INJECTION AS NEEDED
Status: DISCONTINUED | OUTPATIENT
Start: 2020-07-10 | End: 2020-07-10 | Stop reason: HOSPADM

## 2020-07-10 RX ORDER — ONDANSETRON 2 MG/ML
4 INJECTION INTRAMUSCULAR; INTRAVENOUS ONCE AS NEEDED
Status: DISCONTINUED | OUTPATIENT
Start: 2020-07-10 | End: 2020-07-10 | Stop reason: HOSPADM

## 2020-07-10 RX ORDER — SODIUM CHLORIDE 0.9 % (FLUSH) 0.9 %
10 SYRINGE (ML) INJECTION EVERY 12 HOURS SCHEDULED
Status: DISCONTINUED | OUTPATIENT
Start: 2020-07-10 | End: 2020-07-10 | Stop reason: HOSPADM

## 2020-07-10 RX ORDER — OMEPRAZOLE 40 MG/1
40 CAPSULE, DELAYED RELEASE ORAL DAILY
Qty: 90 CAPSULE | Refills: 3 | Status: SHIPPED | OUTPATIENT
Start: 2020-07-10 | End: 2021-07-06

## 2020-07-10 RX ORDER — SODIUM CHLORIDE, SODIUM LACTATE, POTASSIUM CHLORIDE, CALCIUM CHLORIDE 600; 310; 30; 20 MG/100ML; MG/100ML; MG/100ML; MG/100ML
9 INJECTION, SOLUTION INTRAVENOUS CONTINUOUS
Status: DISCONTINUED | OUTPATIENT
Start: 2020-07-10 | End: 2020-07-10 | Stop reason: HOSPADM

## 2020-07-10 RX ORDER — LIDOCAINE HYDROCHLORIDE 10 MG/ML
0.5 INJECTION, SOLUTION EPIDURAL; INFILTRATION; INTRACAUDAL; PERINEURAL ONCE AS NEEDED
Status: DISCONTINUED | OUTPATIENT
Start: 2020-07-10 | End: 2020-07-10 | Stop reason: HOSPADM

## 2020-07-10 RX ADMIN — SODIUM CHLORIDE, POTASSIUM CHLORIDE, SODIUM LACTATE AND CALCIUM CHLORIDE 9 ML/HR: 600; 310; 30; 20 INJECTION, SOLUTION INTRAVENOUS at 10:04

## 2020-07-10 RX ADMIN — PROPOFOL 50 MG: 10 INJECTION, EMULSION INTRAVENOUS at 10:43

## 2020-07-10 RX ADMIN — LIDOCAINE HYDROCHLORIDE 100 MG: 20 INJECTION, SOLUTION INFILTRATION; PERINEURAL at 10:40

## 2020-07-10 RX ADMIN — PROPOFOL 50 MG: 10 INJECTION, EMULSION INTRAVENOUS at 10:46

## 2020-07-10 RX ADMIN — PROPOFOL 100 MG: 10 INJECTION, EMULSION INTRAVENOUS at 10:40

## 2020-07-10 RX ADMIN — PROPOFOL 50 MG: 10 INJECTION, EMULSION INTRAVENOUS at 10:45

## 2020-07-10 NOTE — BRIEF OP NOTE
ESOPHAGOGASTRODUODENOSCOPY  Progress Note    Teofilo Jade  7/10/2020    Pre-op Diagnosis:   Dysphagia, unspecified type [R13.10]  Non-intractable vomiting with nausea, unspecified vomiting type [R11.2]       Post-Op Diagnosis Codes:     * Dysphagia, unspecified type [R13.10]     * Non-intractable vomiting with nausea, unspecified vomiting type [R11.2]     * Ulcerative esophagitis [K22.10]     * Gastritis [K29.70]    Procedure/CPT® Codes:      Procedure(s):  ESOPHAGOGASTRODUODENOSCOPY, biopsy    Surgeon(s):  Vini Lorenzana MD    Anesthesia: Monitored Anesthesia Care    Staff:   Circulator: Kristyn Chun RN  Scrub Person: Lashae Mims    Estimated Blood Loss: none    Urine Voided: * No values recorded between 7/10/2020 10:32 AM and 7/10/2020 10:48 AM *    Specimens:                Specimens     ID Source Type Tests Collected By Collected At Frozen?      A Gastric, Body Tissue · TISSUE PATHOLOGY EXAM   Vini Lorenzana MD 7/10/20 1045      Description: Gastric biopsies    B Esophagus, Distal Tissue · TISSUE PATHOLOGY EXAM   Vini Lorenzana MD 7/10/20 1047      Description: Distal esophagus biopsies                Drains: * No LDAs found *    Findings: Normal Duodenum  Mild Gastritis-Biopsy  Ulcerative Esophagitis-Biopsy    Complications: None      Vini Lorenzana MD     Date: 7/10/2020  Time: 10:50

## 2020-07-10 NOTE — OP NOTE
ESOPHAGOGASTRODUODENOSCOPY  Procedure Report    Patient Name:  Teofilo Jade  YOB: 1955    Date of Surgery:  7/10/2020     Indications:  Dysphagia, unspecified type [R13.10]  Non-intractable vomiting with nausea, unspecified vomiting type [R11.2]      Pre-op Diagnosis:   Dysphagia, unspecified type [R13.10]  Non-intractable vomiting with nausea, unspecified vomiting type [R11.2]    Post-Op Diagnosis Codes:     * Dysphagia, unspecified type [R13.10]     * Non-intractable vomiting with nausea, unspecified vomiting type [R11.2]     * Ulcerative esophagitis [K22.10]     * Gastritis [K29.70]         Procedure/CPT® Codes:      Procedure(s):  ESOPHAGOGASTRODUODENOSCOPY, biopsy    Staff:  Surgeon(s):  Vini Lorenzana MD         Anesthesia: Monitored Anesthesia Care    Estimated Blood Loss: none    Specimens:   ID Type Source Tests Collected by Time   A : Gastric biopsies Tissue Gastric, Body TISSUE PATHOLOGY EXAM Vini Lorenzana MD 7/10/2020 1045   B : Distal esophagus biopsies Tissue Esophagus, Distal TISSUE PATHOLOGY EXAM Vini Lorenzana MD 7/10/2020 1047       Implants:    Nothing was implanted during the procedure      Description of Procedure: After having signed informed consent, he was brought to the endoscopy suite, placed in left lateral decubitus position and given his IV sedation. A bite block was placed between his incisors. The scope was introduced in the oropharynx, advanced under direct visualization with ease in the esophagus, through the distal esophagus.  There was evidence of ulcerative esophagitis, but no visible stricture. The scope was advanced in the stomach and retroflexed revealing normal cardia and fundus. The scope was deretroflexed and advanced in the antrum.  There were scattered areas of erythema, but no jose luis erosions noted in the stomach. The scope was advanced through the pylorus, through the duodenal bulb which was examined and normal,  around the angle up to the 2nd and 3rd portions of the duodenum, which were normal as well. The scope was withdrawn back in the antrum. Biopsies were taken to rule out H. pylori. The scope was withdrawn in the distal esophagus and biopsies were taken to rule out short segment of Fischer’s esophagus. As the scope was withdrawn, no further mucosal lesions were noted within the esophagus.  The scope was taken from the patient.  He tolerated the procedure very well.          Findings: Normal Duodenum  Mild Gastritis-Biopsy  Ulcerative Esophagitis-Biopsy       Complications: None    Recommendations: Results to be called.  Daily PPI, Follow up in 2 months      Vini Lorenzana MD     Date: 7/10/2020  Time: 10:50

## 2020-07-10 NOTE — INTERVAL H&P NOTE
Vital Signs  /66 (BP Location: Left arm, Patient Position: Lying)   Pulse 60   Temp 98.1 °F (36.7 °C) (Oral)   Resp 16   Wt 78.1 kg (172 lb 3.2 oz)   SpO2 98%   BMI 26.19 kg/m²     H&P reviewed. The patient was examined and there are no changes to the H&P.

## 2020-07-10 NOTE — ANESTHESIA POSTPROCEDURE EVALUATION
Patient: Teofilo Jade    Procedure Summary     Date:  07/10/20 Room / Location:  [facility] ENDOSCOPY 1 /  [facility]    Anesthesia Start:  1032 Anesthesia Stop:  1050    Procedure:  ESOPHAGOGASTRODUODENOSCOPY, biopsy (N/A Esophagus) Diagnosis:       Dysphagia, unspecified type      Non-intractable vomiting with nausea, unspecified vomiting type      Ulcerative esophagitis      Gastritis      (Dysphagia, unspecified type [R13.10])      (Non-intractable vomiting with nausea, unspecified vomiting type [R11.2])    Surgeon:  Vini Lorenzana MD Provider:  Melvin Belle CRNA    Anesthesia Type:  MAC ASA Status:  2          Anesthesia Type: MAC    Vitals  Vitals Value Taken Time   /79 7/10/2020 11:31 AM   Temp     Pulse 59 7/10/2020 11:31 AM   Resp 12 7/10/2020 11:31 AM   SpO2 98 % 7/10/2020 11:31 AM           Post Anesthesia Care and Evaluation    Patient location during evaluation: PHASE II  Patient participation: complete - patient participated  Level of consciousness: awake  Pain management: adequate  Airway patency: patent  Anesthetic complications: No anesthetic complications  PONV Status: none  Cardiovascular status: acceptable  Respiratory status: acceptable  Hydration status: acceptable

## 2020-07-10 NOTE — ANESTHESIA PREPROCEDURE EVALUATION
Anesthesia Evaluation     Patient summary reviewed and Nursing notes reviewed   no history of anesthetic complications:  NPO Solid Status: > 8 hours  NPO Liquid Status: > 8 hours           Airway   Mallampati: II  TM distance: >3 FB  Neck ROM: full  No difficulty expected  Dental    (+) partials    Pulmonary     breath sounds clear to auscultation  (+) a smoker (quit 10yrs ago ) Former, sleep apnea on CPAP,   Cardiovascular   Exercise tolerance: good (4-7 METS)    ECG reviewed  Patient on routine beta blocker and Beta blocker given within 24 hours of surgery  Rhythm: regular  Rate: normal    (+) hypertension 2 medications or greater,     ROS comment: History of cardiomyopathy    Narrative     Sunil Wolf III, MD     6/25/2020 11:17 AM    ECG 12 Lead  Date/Time: 6/25/2020 11:16 AM  Performed by: Sunil Wolf III, MD  Authorized by: Sunil Wolf III, MD   Comparison: compared with previous ECG   Similar to previous ECG  Rhythm: sinus rhythm  Rate: normal  Conduction: conduction normal  ST Segments: ST segments normal  T Waves: T waves normal  QRS axis: normal  Other: no other findings    Clinical impression: normal ECG        Neuro/Psych  (+) numbness (ileana LEs sciatic nerve 6 months ago states its better now ), psychiatric history Anxiety,     GI/Hepatic/Renal/Endo    (+)  PUD, GI bleeding lower resolved,     ROS Comment: Ulcerative pancolitis without complication (CMS/HCC)  Dysphagia  Non-intractable vomiting    Musculoskeletal     Abdominal    Substance History   (+) alcohol use (1 beer per month ),      OB/GYN          Other   arthritis,      ROS/Med Hx Other: EKG also shows LBBB      Phys Exam Other: Partials x 2 will remove for procedure               Anesthesia Plan    ASA 2     MAC     intravenous induction     Anesthetic plan, all risks, benefits, and alternatives have been provided, discussed and informed consent has been obtained with: patient.  Use of blood products discussed with patient  Consented  to blood products.

## 2020-07-13 LAB
CYTO UR: NORMAL
LAB AP CASE REPORT: NORMAL
PATH REPORT.FINAL DX SPEC: NORMAL
PATH REPORT.GROSS SPEC: NORMAL

## 2020-07-15 RX ORDER — AMOXICILLIN 500 MG/1
1000 CAPSULE ORAL 2 TIMES DAILY
Qty: 56 CAPSULE | Refills: 0 | Status: SHIPPED | OUTPATIENT
Start: 2020-07-15 | End: 2022-06-30

## 2020-07-15 RX ORDER — CLARITHROMYCIN 500 MG/1
500 TABLET, COATED ORAL 2 TIMES DAILY
Qty: 28 TABLET | Refills: 0 | Status: SHIPPED | OUTPATIENT
Start: 2020-07-15 | End: 2022-06-30

## 2020-08-19 DIAGNOSIS — J34.89 SINUS DRAINAGE: ICD-10-CM

## 2020-08-31 ENCOUNTER — OFFICE VISIT (OUTPATIENT)
Dept: GASTROENTEROLOGY | Facility: CLINIC | Age: 65
End: 2020-08-31

## 2020-08-31 VITALS — WEIGHT: 172.4 LBS | BODY MASS INDEX: 26.13 KG/M2 | HEIGHT: 68 IN | TEMPERATURE: 98.1 F

## 2020-08-31 DIAGNOSIS — K51.00 ULCERATIVE PANCOLITIS WITHOUT COMPLICATION (HCC): Primary | ICD-10-CM

## 2020-08-31 DIAGNOSIS — R11.10 NON-INTRACTABLE VOMITING, PRESENCE OF NAUSEA NOT SPECIFIED, UNSPECIFIED VOMITING TYPE: ICD-10-CM

## 2020-08-31 DIAGNOSIS — R13.19 ESOPHAGEAL DYSPHAGIA: ICD-10-CM

## 2020-08-31 PROCEDURE — 99213 OFFICE O/P EST LOW 20 MIN: CPT | Performed by: NURSE PRACTITIONER

## 2020-08-31 NOTE — PROGRESS NOTES
PATIENT INFORMATION  Teofilo Jade       - 1955    CHIEF COMPLAINT  Chief Complaint   Patient presents with   • Follow-up     2 mo follow up on EGD - Positive H Pylori       HISTORY OF PRESENT ILLNESS  Has only had 2 episodes of vomiting since EGD that were at the beginning of his treatment and is now symptom free except for allergies.  Uses a cpap at night enc to discuss with pcp but he is taking the ceterizine with some relief and tried fluticasone but only for a few weeks so enc to continue this as well.     No UC flares doing well on mesalamine.           REVIEW OF SYSTEMS  Review of Systems   All other systems reviewed and are negative.        ACTIVE PROBLEMS  Patient Active Problem List    Diagnosis   • Dysphagia [R13.10]   • Non-intractable vomiting [R11.10]   • History of cardiomyopathy [Z86.79]   • Ulcerative pancolitis without complication (CMS/HCC) [K51.00]   • Rectal bleeding [K62.5]   • Generalized abdominal pain [R10.84]   • Diarrhea [R19.7]   • Hypertension [I10]   • SEBASTIAN (obstructive sleep apnea) [G47.33]         PAST MEDICAL HISTORY  Past Medical History:   Diagnosis Date   • Anxiety    • Colon polyp    • History of cardiomyopathy 3/28/2018   • Hypertension    • Nonischemic cardiomyopathy (CMS/HCC)    • SEBASTIAN (obstructive sleep apnea)    • Ulcer of abdomen wall (CMS/HCC)          SURGICAL HISTORY  Past Surgical History:   Procedure Laterality Date   • COLONOSCOPY     • COLONOSCOPY N/A 10/2/2017    Procedure: COLONOSCOPY, biopsy; polypectomy;  Surgeon: Sushila Amezquita MD;  Location: [facility] OR;  Service:    • ENDOSCOPY N/A 7/10/2020    Procedure: ESOPHAGOGASTRODUODENOSCOPY, biopsy;  Surgeon: Vini Lorenzana MD;  Location: [facility] OR;  Service: Gastroenterology;  Laterality: N/A;  Ulcerative esophagitis; Biopsies- gastric and distal esophagus; gastritis   • KNEE SURGERY  2002    both knee         FAMILY HISTORY  Family History   Problem Relation Age of Onset   • Heart disease  "Mother    • Diabetes Mother    • Stroke Mother    • Hypertension Mother    • Colon polyps Mother    • Heart disease Father    • Diabetes Father          SOCIAL HISTORY  Social History     Occupational History   • Not on file   Tobacco Use   • Smoking status: Former Smoker   • Smokeless tobacco: Never Used   • Tobacco comment: caffine 3 cups daily - quit 5 yrs ago   Substance and Sexual Activity   • Alcohol use: Not Currently     Comment: occ    • Drug use: No   • Sexual activity: Defer         CURRENT MEDICATIONS    Current Outpatient Medications:   •  amoxicillin (AMOXIL) 500 MG capsule, Take 2 capsules by mouth 2 (Two) Times a Day., Disp: 56 capsule, Rfl: 0  •  carvedilol (COREG) 3.125 MG tablet, TAKE 1 TABLET BY MOUTH 2 TIMES A DAY AS DIRECTED., Disp: 180 tablet, Rfl: 0  •  Cetirizine HCl 10 MG capsule, Take 10 mg by mouth Every Morning., Disp: 90 capsule, Rfl: 3  •  clarithromycin (BIAXIN) 500 MG tablet, Take 1 tablet by mouth 2 (Two) Times a Day., Disp: 28 tablet, Rfl: 0  •  LORazepam (ATIVAN) 1 MG tablet, Take 1 mg by mouth Daily., Disp: , Rfl:   •  losartan (COZAAR) 50 MG tablet, TAKE 1/2 TABLET EVERY DAY., Disp: 45 tablet, Rfl: 0  •  mesalamine (LIALDA) 1.2 g EC tablet, Take 4 tablets by mouth Daily., Disp: 360 tablet, Rfl: 6  •  omeprazole (priLOSEC) 40 MG capsule, Take 1 capsule by mouth Daily., Disp: 90 capsule, Rfl: 3  •  spironolactone (ALDACTONE) 25 MG tablet, TAKE 1 TABLET BY MOUTH DAILY., Disp: 90 tablet, Rfl: 2  •  traZODone (DESYREL) 100 MG tablet, Take 100 mg by mouth Every Night., Disp: , Rfl:   •  zolpidem CR (AMBIEN CR) 12.5 MG CR tablet, Take  by mouth., Disp: , Rfl:     ALLERGIES  Patient has no known allergies.    VITALS  Vitals:    08/31/20 1356   Temp: 98.1 °F (36.7 °C)   TempSrc: Temporal   Weight: 78.2 kg (172 lb 6.4 oz)   Height: 172.7 cm (67.99\")       LAST RESULTS   Admission on 07/10/2020, Discharged on 07/10/2020   Component Date Value Ref Range Status   • Case Report 07/10/2020    " "Final                    Value:Surgical Pathology Report                         Case: [id number]                                  Authorizing Provider:  Vini Lorenzana        Collected:           07/10/2020 10:45 AM                                 MD Lilian                                                                   Ordering Location:     [facility]   Received:            07/10/2020 01:03 PM                                 OR                                                                           Pathologist:           Azalea Salas MD                                                          Specimens:   1) - Gastric, Body, Gastric biopsies                                                                2) - Esophagus, Distal, Distal esophagus biopsies                                         • Final Diagnosis 07/10/2020    Final                    Value:This result contains rich text formatting which cannot be displayed here.   • Gross Description 07/10/2020    Final                    Value:This result contains rich text formatting which cannot be displayed here.   • Microscopic Description 07/10/2020    Final                    Value:This result contains rich text formatting which cannot be displayed here.     No results found.    PHYSICAL EXAM  Debilities/Disabilities Identified: None  Emotional Behavior: Appropriate  Wt Readings from Last 3 Encounters:   08/31/20 78.2 kg (172 lb 6.4 oz)   07/10/20 78.1 kg (172 lb 3.2 oz)   06/25/20 78 kg (172 lb)     Ht Readings from Last 1 Encounters:   08/31/20 172.7 cm (67.99\")     Body mass index is 26.22 kg/m².  Physical Exam   Constitutional: He is oriented to person, place, and time. He appears well-developed and well-nourished.   HENT:   Head: Normocephalic and atraumatic.   Eyes: Pupils are equal, round, and reactive to light. Conjunctivae are normal.   Neck: Normal range of motion. Neck supple.   Cardiovascular: Normal rate and regular " rhythm.   Pulmonary/Chest: Effort normal and breath sounds normal.   Abdominal: Soft. Bowel sounds are normal. He exhibits no distension.   Musculoskeletal: Normal range of motion.   Lymphadenopathy:     He has no cervical adenopathy.   Neurological: He is alert and oriented to person, place, and time.   Skin: Skin is warm and dry.   Psychiatric: He has a normal mood and affect. His behavior is normal.   Nursing note and vitals reviewed.      CLINICAL DATA REVIEWED   reviewed previous lab results and integrated with today's visit, reviewed notes from other physicians and/or last GI encounter, reviewed previous endoscopy results and available photos, reviewed surgical pathology results from previous biopsies    ASSESSMENT  Diagnoses and all orders for this visit:    Ulcerative pancolitis without complication (CMS/HCC)    Non-intractable vomiting, presence of nausea not specified, unspecified vomiting type    Esophageal dysphagia          PLAN  Return if symptoms worsen or fail to improve.     Discussed importance of continuing on his PPI permanently.  He verb understanding.   Will f/u prn and let us know when he flares with his colon but mesalamine is keeping him healthy at present.     10/2/22 colon recall 5 yr pers hist polyp    I have discussed the above plan with the patient.  They verbalize understanding and are in agreement with the plan.  They have been advised to contact the office for any questions, concerns, or changes related to their health.

## 2020-09-14 RX ORDER — CARVEDILOL 3.12 MG/1
TABLET ORAL
Qty: 180 TABLET | Refills: 2 | Status: SHIPPED | OUTPATIENT
Start: 2020-09-14 | End: 2021-06-14

## 2020-09-14 RX ORDER — LOSARTAN POTASSIUM 50 MG/1
TABLET ORAL
Qty: 45 TABLET | Refills: 2 | Status: SHIPPED | OUTPATIENT
Start: 2020-09-14 | End: 2021-06-14

## 2020-10-15 RX ORDER — MESALAMINE 1.2 G/1
4.8 TABLET, DELAYED RELEASE ORAL DAILY
Qty: 360 TABLET | Refills: 6 | Status: SHIPPED | OUTPATIENT
Start: 2020-10-15 | End: 2021-10-27

## 2020-10-26 DIAGNOSIS — I10 ESSENTIAL HYPERTENSION: Primary | Chronic | ICD-10-CM

## 2020-10-26 RX ORDER — SPIRONOLACTONE 25 MG/1
TABLET ORAL
Qty: 90 TABLET | Refills: 0 | Status: SHIPPED | OUTPATIENT
Start: 2020-10-26 | End: 2021-02-01

## 2020-10-26 NOTE — TELEPHONE ENCOUNTER
Next appointment 06/24/2021.      Assessment:      Assessment            Diagnosis Plan   1. Essential hypertension  ECG 12 Lead   2. History of cardiomyopathy  ECG 12 Lead            Plan:      Plan       1.  hypertension-continue current medical regimen, excellent control  2.  History of cardiomyopathy with recovery of function-continue current medical regimen, doing well  3.  Obstructive sleep apnea on CPAP  4.  Diagnosed with ulcerative colitis.  He is having some issues after he eats and is scheduled to see Dr. Lorenzana soon

## 2021-02-01 DIAGNOSIS — I10 ESSENTIAL HYPERTENSION: Primary | ICD-10-CM

## 2021-02-01 RX ORDER — SPIRONOLACTONE 25 MG/1
TABLET ORAL
Qty: 90 TABLET | Refills: 0 | Status: SHIPPED | OUTPATIENT
Start: 2021-02-01 | End: 2021-04-27

## 2021-04-27 RX ORDER — SPIRONOLACTONE 25 MG/1
TABLET ORAL
Qty: 90 TABLET | Refills: 0 | Status: SHIPPED | OUTPATIENT
Start: 2021-04-27 | End: 2021-07-20

## 2021-06-14 RX ORDER — CARVEDILOL 3.12 MG/1
TABLET ORAL
Qty: 180 TABLET | Refills: 0 | Status: SHIPPED | OUTPATIENT
Start: 2021-06-14 | End: 2021-09-07

## 2021-06-14 RX ORDER — LOSARTAN POTASSIUM 50 MG/1
TABLET ORAL
Qty: 45 TABLET | Refills: 0 | Status: SHIPPED | OUTPATIENT
Start: 2021-06-14 | End: 2021-09-07

## 2021-06-24 ENCOUNTER — OFFICE VISIT (OUTPATIENT)
Dept: CARDIOLOGY | Facility: CLINIC | Age: 66
End: 2021-06-24

## 2021-06-24 VITALS
HEIGHT: 67 IN | WEIGHT: 182 LBS | OXYGEN SATURATION: 98 % | SYSTOLIC BLOOD PRESSURE: 122 MMHG | BODY MASS INDEX: 28.56 KG/M2 | HEART RATE: 57 BPM | DIASTOLIC BLOOD PRESSURE: 84 MMHG | RESPIRATION RATE: 16 BRPM

## 2021-06-24 DIAGNOSIS — K51.00 ULCERATIVE PANCOLITIS WITHOUT COMPLICATION (HCC): ICD-10-CM

## 2021-06-24 DIAGNOSIS — I10 ESSENTIAL HYPERTENSION: Chronic | ICD-10-CM

## 2021-06-24 DIAGNOSIS — Z86.79 HISTORY OF CARDIOMYOPATHY: Primary | Chronic | ICD-10-CM

## 2021-06-24 DIAGNOSIS — G47.33 OSA (OBSTRUCTIVE SLEEP APNEA): Chronic | ICD-10-CM

## 2021-06-24 PROCEDURE — 93000 ELECTROCARDIOGRAM COMPLETE: CPT | Performed by: INTERNAL MEDICINE

## 2021-06-24 PROCEDURE — 99214 OFFICE O/P EST MOD 30 MIN: CPT | Performed by: INTERNAL MEDICINE

## 2021-06-24 RX ORDER — ROSUVASTATIN CALCIUM 5 MG/1
5 TABLET, COATED ORAL DAILY
COMMUNITY
Start: 2021-06-04

## 2021-06-24 NOTE — PROGRESS NOTES
Subjective:     Encounter Date:  06/24/21        Patient ID: Teofilo Jade is a 65 y.o. male.    Chief Complaint:  Cardiomyopathy  Pertinent negatives include no anorexia, chills, fever, myalgias, neck pain, numbness, rash or sore throat.       Dear Dr. Koroma,    This patient comes in for routine follow-up of their cardiac status.  It has been one year since his last visit. He has a history of cardiomyopathy with recovery of function.    He states he recently had some blood work in your office.  He feels fine without any cardiac complaints.  He denies any chest pain, pressure, tightness, squeezing, or heartburn.  He has not experienced any feeling of palpitations, tachycardia or heart racing and no presyncope or syncope.  There has not been any problems with dizziness or lightheadedness.  There has not been any orthopnea or PND, and no problems with lower extremity edema.  He denies any shortness of breath at rest or with activity and has not had any wheezing.  He has not had any problems with unexplained nausea or vomiting. He has continued to perform daily activities of living without any specific problem or change in the level of activity.  He has not been recently hospitalized for any reason.    Echo 5/2019:  Interpretation Summary     · Left ventricular systolic function is normal.  · The following left ventricular wall segments are hypokinetic: apical septal, basal inferoseptal, mid inferoseptal and basal inferoseptal.  · Left ventricular wall thickness is consistent with borderline concentric hypertrophy.  · Left ventricular diastolic dysfunction (grade I) consistent with impaired relaxation.  · Calculated EF = 55.0%.        Holter 5/2019:  Interpretation Summary     · A normal monitor study.          His history dates back to 2012 when he was found to have diminished left ventricular function.  He was seen by Dr. Weiss at that time.  He had a stress test and then a cardiac catheterization which documented  a nonischemic cardiomyopathy.  He was treated with medical therapy.  In 2014 he was seen again by Dr. Malloy and he had a Lexiscan Cardiolite performed.  No ischemia was seen in the ejection fraction was 50%.    The following portions of the patient's history were reviewed and updated as appropriate: allergies, current medications, past family history, past medical history, past social history, past surgical history and problem list.    Past Medical History:   Diagnosis Date   • Anxiety    • Colon polyp    • History of cardiomyopathy 3/28/2018   • Hypertension    • Nonischemic cardiomyopathy (CMS/HCC)    • SEBASTIAN (obstructive sleep apnea)    • Ulcer of abdomen wall (CMS/HCC)        Past Surgical History:   Procedure Laterality Date   • COLONOSCOPY     • COLONOSCOPY N/A 10/2/2017    Procedure: COLONOSCOPY, biopsy; polypectomy;  Surgeon: Sushila Amezquita MD;  Location: [facility];  Service:    • ENDOSCOPY N/A 7/10/2020    Procedure: ESOPHAGOGASTRODUODENOSCOPY, biopsy;  Surgeon: Vini Lorenzana MD;  Location: [facility] OR;  Service: Gastroenterology;  Laterality: N/A;  Ulcerative esophagitis; Biopsies- gastric and distal esophagus; gastritis   • KNEE SURGERY  2002 2003    both knee       Social History     Socioeconomic History   • Marital status:      Spouse name: Not on file   • Number of children: Not on file   • Years of education: Not on file   • Highest education level: Not on file   Tobacco Use   • Smoking status: Former Smoker   • Smokeless tobacco: Never Used   • Tobacco comment: caffine 3 cups daily - quit 5 yrs ago   Vaping Use   • Vaping Use: Never used   Substance and Sexual Activity   • Alcohol use: Not Currently     Comment: occ    • Drug use: No   • Sexual activity: Defer           ECG 12 Lead    Date/Time: 6/24/2021 4:01 PM  Performed by: Sunil Wolf III, MD  Authorized by: Sunil Wolf III, MD   Comparison: compared with previous ECG   Similar to previous ECG  Rate:  "normal  Conduction: non-specific intraventricular conduction delay  QRS axis: normal    Clinical impression: normal ECG and non-specific ECG               Objective:     Vitals:    06/24/21 1108   BP: 122/84   BP Location: Right arm   Patient Position: Lying   Cuff Size: Adult   Pulse: 57   Resp: 16   SpO2: 98%   Weight: 82.6 kg (182 lb)   Height: 170.2 cm (67\")         Physical Exam  Constitutional:       General: He is not in acute distress.     Appearance: He is well-developed. He is not diaphoretic.   HENT:      Head: Normocephalic and atraumatic.      Nose: Nose normal.   Eyes:      General:         Right eye: No discharge.         Left eye: No discharge.      Conjunctiva/sclera: Conjunctivae normal.      Pupils: Pupils are equal, round, and reactive to light.   Neck:      Thyroid: No thyromegaly.      Trachea: No tracheal deviation.   Cardiovascular:      Rate and Rhythm: Normal rate and regular rhythm.      Pulses: Normal pulses.      Heart sounds: Normal heart sounds, S1 normal and S2 normal. No S3 sounds.    Pulmonary:      Effort: Pulmonary effort is normal. No respiratory distress.      Breath sounds: Normal breath sounds. No stridor.   Chest:      Chest wall: No tenderness.   Abdominal:      General: Bowel sounds are normal. There is no distension.      Palpations: Abdomen is soft. There is no mass.      Tenderness: There is no abdominal tenderness. There is no guarding or rebound.   Musculoskeletal:         General: No tenderness or deformity. Normal range of motion.      Cervical back: Normal range of motion and neck supple.   Lymphadenopathy:      Cervical: No cervical adenopathy.   Skin:     General: Skin is warm and dry.      Findings: No erythema or rash.   Neurological:      Mental Status: He is alert and oriented to person, place, and time.      Deep Tendon Reflexes: Reflexes are normal and symmetric.   Psychiatric:         Thought Content: Thought content normal.         Lab Review:         "     Results for orders placed during the hospital encounter of 05/29/19    Adult Transthoracic Echo Complete W/ Cont if Necessary Per Protocol    Interpretation Summary  · Left ventricular systolic function is normal.  · The following left ventricular wall segments are hypokinetic: apical septal, basal inferoseptal, mid inferoseptal and basal inferoseptal.  · Left ventricular wall thickness is consistent with borderline concentric hypertrophy.  · Left ventricular diastolic dysfunction (grade I) consistent with impaired relaxation.  · Calculated EF = 55.0%.            Assessment:          Diagnosis Plan   1. History of cardiomyopathy  ECG 12 Lead   2. Essential hypertension  ECG 12 Lead   3. SEBASTIAN (obstructive sleep apnea)  ECG 12 Lead   4. Ulcerative pancolitis without complication (CMS/HCC)  ECG 12 Lead          Plan:       1.  hypertension-continue current medical regimen, excellent control on the carvedilol, continue same along with the losartan.  2.  History of cardiomyopathy with recovery of function-continue current medical regimen with his carvedilol and losartan, continue spironolactone, creatinine, BUN, potassium reviewed, doing well  3.  Obstructive sleep apnea on CPAP  4.  Diagnosed with ulcerative colitis.  He follows with GI    Thank you very much for allowing us to participate in the care of this pleasant patient.  Please don't hesitate to call if I can be of assistance in any way.      Current Outpatient Medications:   •  carvedilol (COREG) 3.125 MG tablet, TAKE 1 TABLET BY MOUTH 2 TIMES A DAY AS DIRECTED., Disp: 180 tablet, Rfl: 0  •  Cetirizine HCl 10 MG capsule, Take 10 mg by mouth Every Morning., Disp: 90 capsule, Rfl: 3  •  LORazepam (ATIVAN) 1 MG tablet, Take 1 mg by mouth Daily., Disp: , Rfl:   •  losartan (COZAAR) 50 MG tablet, TAKE 1/2 TABLET DAILY., Disp: 45 tablet, Rfl: 0  •  mesalamine (Lialda) 1.2 g EC tablet, Take 4 tablets by mouth Daily., Disp: 360 tablet, Rfl: 6  •  omeprazole  (priLOSEC) 40 MG capsule, Take 1 capsule by mouth Daily., Disp: 90 capsule, Rfl: 3  •  rosuvastatin (CRESTOR) 5 MG tablet, , Disp: , Rfl:   •  spironolactone (ALDACTONE) 25 MG tablet, TAKE 1 TABLET BY MOUTH DAILY., Disp: 90 tablet, Rfl: 0  •  traZODone (DESYREL) 100 MG tablet, Take 100 mg by mouth Every Night., Disp: , Rfl:   •  zolpidem CR (AMBIEN CR) 12.5 MG CR tablet, Take  by mouth., Disp: , Rfl:   •  amoxicillin (AMOXIL) 500 MG capsule, Take 2 capsules by mouth 2 (Two) Times a Day., Disp: 56 capsule, Rfl: 0  •  clarithromycin (BIAXIN) 500 MG tablet, Take 1 tablet by mouth 2 (Two) Times a Day., Disp: 28 tablet, Rfl: 0         EMR Dragon/Transcription disclaimer:    Much of this encounter note is an electronic transcription/translation of spoken language to printed text. The electronic translation of spoken language may permit erroneous, or at times, nonsensical words or phrases to be inadvertently transcribed; Although I have reviewed the note for such errors, some may still exist.

## 2021-07-06 RX ORDER — OMEPRAZOLE 40 MG/1
40 CAPSULE, DELAYED RELEASE ORAL DAILY
Qty: 90 CAPSULE | Refills: 3 | Status: SHIPPED | OUTPATIENT
Start: 2021-07-06 | End: 2022-06-27

## 2021-07-20 RX ORDER — SPIRONOLACTONE 25 MG/1
TABLET ORAL
Qty: 90 TABLET | Refills: 3 | Status: SHIPPED | OUTPATIENT
Start: 2021-07-20 | End: 2022-08-01

## 2021-09-07 DIAGNOSIS — J34.89 SINUS DRAINAGE: ICD-10-CM

## 2021-09-07 RX ORDER — LOSARTAN POTASSIUM 50 MG/1
TABLET ORAL
Qty: 45 TABLET | Refills: 2 | Status: SHIPPED | OUTPATIENT
Start: 2021-09-07 | End: 2022-05-31

## 2021-09-07 RX ORDER — CETIRIZINE HYDROCHLORIDE 10 MG/1
TABLET ORAL
Qty: 90 TABLET | Refills: 0 | Status: SHIPPED | OUTPATIENT
Start: 2021-09-07 | End: 2021-12-06

## 2021-09-07 RX ORDER — CARVEDILOL 3.12 MG/1
TABLET ORAL
Qty: 180 TABLET | Refills: 2 | Status: SHIPPED | OUTPATIENT
Start: 2021-09-07 | End: 2022-05-31

## 2021-10-27 RX ORDER — MESALAMINE 1.2 G/1
4.8 TABLET, DELAYED RELEASE ORAL DAILY
Qty: 360 TABLET | Refills: 3 | Status: SHIPPED | OUTPATIENT
Start: 2021-10-27 | End: 2022-11-08

## 2021-12-06 DIAGNOSIS — J34.89 SINUS DRAINAGE: ICD-10-CM

## 2021-12-06 RX ORDER — CETIRIZINE HYDROCHLORIDE 10 MG/1
TABLET ORAL
Qty: 90 TABLET | Refills: 0 | Status: SHIPPED | OUTPATIENT
Start: 2021-12-06 | End: 2022-08-01

## 2022-04-25 ENCOUNTER — TELEPHONE (OUTPATIENT)
Dept: CARDIOLOGY | Facility: CLINIC | Age: 67
End: 2022-04-25

## 2022-04-25 NOTE — TELEPHONE ENCOUNTER
----- Message from Ashley Melendez sent at 4/22/2022  8:51 AM EDT -----  Regarding: Sleep Med  Dr. Wolf,  Pt called today and was wondering if you can write a letter to his insurance. He has suddenly been denied his sleep medicine, Zolpidem tarte 12.5 mg after 10 yrs. Dr. Fischer in [place] had prescribed it to help him sleep so his CPAP works better. Dr. Fischer has tried one appeal already and was denied. Per pt the only dx used was anxiety disorder. Can we do anything to help him? I told him I wasn't sure about the letter.      Thanks,  Hilda

## 2022-04-25 NOTE — TELEPHONE ENCOUNTER
I spoke to the pt and advise him that we do not prescribe Zolpidem tarte 12.5 and the PA would have to come from .

## 2022-04-26 ENCOUNTER — TELEPHONE (OUTPATIENT)
Dept: GASTROENTEROLOGY | Facility: CLINIC | Age: 67
End: 2022-04-26

## 2022-04-26 NOTE — TELEPHONE ENCOUNTER
Recent labs scanned into media ( will forward to md for review)    LOV: 08/31/20   EGD:07/10/20  C/S: 10/21/17    Hx UC on mesalamine    Pt past due for routine follow up  Mailed reminder letter

## 2022-05-31 RX ORDER — LOSARTAN POTASSIUM 50 MG/1
TABLET ORAL
Qty: 45 TABLET | Refills: 0 | Status: SHIPPED | OUTPATIENT
Start: 2022-05-31 | End: 2022-08-29

## 2022-05-31 RX ORDER — CARVEDILOL 3.12 MG/1
TABLET ORAL
Qty: 180 TABLET | Refills: 0 | Status: SHIPPED | OUTPATIENT
Start: 2022-05-31 | End: 2022-08-29

## 2022-05-31 NOTE — TELEPHONE ENCOUNTER
Next OV-06/30/22-JA  Last OV-06/24/21-JA    Plan:      Plan       1.  hypertension-continue current medical regimen, excellent control on the carvedilol, continue same along with the losartan.  2.  History of cardiomyopathy with recovery of function-continue current medical regimen with his carvedilol and losartan, continue spironolactone, creatinine, BUN, potassium reviewed, doing well  3.  Obstructive sleep apnea on CPAP  4.  Diagnosed with ulcerative colitis.  He follows with GI       Called and spoke with Madeline at the PCP office and she is going to send the labs over in a few.    Yung

## 2022-06-27 RX ORDER — OMEPRAZOLE 40 MG/1
40 CAPSULE, DELAYED RELEASE ORAL DAILY
Qty: 90 CAPSULE | Refills: 0 | Status: SHIPPED | OUTPATIENT
Start: 2022-06-27 | End: 2022-09-26

## 2022-06-30 ENCOUNTER — OFFICE VISIT (OUTPATIENT)
Dept: CARDIOLOGY | Facility: CLINIC | Age: 67
End: 2022-06-30

## 2022-06-30 VITALS
HEART RATE: 64 BPM | WEIGHT: 176 LBS | HEIGHT: 67 IN | BODY MASS INDEX: 27.62 KG/M2 | DIASTOLIC BLOOD PRESSURE: 80 MMHG | SYSTOLIC BLOOD PRESSURE: 126 MMHG

## 2022-06-30 DIAGNOSIS — Z86.79 HISTORY OF CARDIOMYOPATHY: Primary | Chronic | ICD-10-CM

## 2022-06-30 DIAGNOSIS — I10 PRIMARY HYPERTENSION: Chronic | ICD-10-CM

## 2022-06-30 DIAGNOSIS — I44.7 LBBB (LEFT BUNDLE BRANCH BLOCK): ICD-10-CM

## 2022-06-30 PROCEDURE — 93000 ELECTROCARDIOGRAM COMPLETE: CPT | Performed by: INTERNAL MEDICINE

## 2022-06-30 PROCEDURE — 99214 OFFICE O/P EST MOD 30 MIN: CPT | Performed by: INTERNAL MEDICINE

## 2022-06-30 NOTE — PROGRESS NOTES
Subjective:     Encounter Date:  06/30/22        Patient ID: Teofilo Jade is a 66 y.o. male.    Chief Complaint:    Dear Dr. Koroma,    This patient comes in for routine follow-up of their cardiac status.  It has been one year since his last visit. He has a history of cardiomyopathy with recovery of function.    Overall he feels like his been doing great.  No chest pain or chest discomfort, no shortness of breath.  No palpitations or tachycardia, no presyncope or syncope.  No orthopnea or PND.    Echo 5/2019:  Interpretation Summary     · Left ventricular systolic function is normal.  · The following left ventricular wall segments are hypokinetic: apical septal, basal inferoseptal, mid inferoseptal and basal inferoseptal.  · Left ventricular wall thickness is consistent with borderline concentric hypertrophy.  · Left ventricular diastolic dysfunction (grade I) consistent with impaired relaxation.  · Calculated EF = 55.0%.        Holter 5/2019:  Interpretation Summary     · A normal monitor study.          His history dates back to 2012 when he was found to have diminished left ventricular function.  He was seen by Dr. Weiss at that time.  He had a stress test and then a cardiac catheterization which documented a nonischemic cardiomyopathy.  He was treated with medical therapy.  In 2014 he was seen again by Dr. Malloy and he had a Lexiscan Cardiolite performed.  No ischemia was seen in the ejection fraction was 50%.    The following portions of the patient's history were reviewed and updated as appropriate: allergies, current medications, past family history, past medical history, past social history, past surgical history and problem list.    Past Medical History:   Diagnosis Date   • Anxiety    • Colon polyp    • History of cardiomyopathy 3/28/2018   • Hypertension    • Nonischemic cardiomyopathy (HCC)    • SEBASTIAN (obstructive sleep apnea)    • Ulcer of abdomen wall (HCC)        Past Surgical History:   Procedure  "Laterality Date   • COLONOSCOPY     • COLONOSCOPY N/A 10/2/2017    Procedure: COLONOSCOPY, biopsy; polypectomy;  Surgeon: Sushila Amezquita MD;  Location: [facility] OR;  Service:    • ENDOSCOPY N/A 7/10/2020    Procedure: ESOPHAGOGASTRODUODENOSCOPY, biopsy;  Surgeon: Vini Lorenzana MD;  Location: [facility] OR;  Service: Gastroenterology;  Laterality: N/A;  Ulcerative esophagitis; Biopsies- gastric and distal esophagus; gastritis   • KNEE SURGERY  2002 2003    both knee       Social History     Socioeconomic History   • Marital status:    Tobacco Use   • Smoking status: Former Smoker   • Smokeless tobacco: Never Used   • Tobacco comment: caffine 3 cups daily - quit 5 yrs ago   Vaping Use   • Vaping Use: Never used   Substance and Sexual Activity   • Alcohol use: Not Currently     Comment: occ    • Drug use: No   • Sexual activity: Defer           ECG 12 Lead    Date/Time: 6/30/2022 10:38 AM  Performed by: Sunil Wolf III, MD  Authorized by: Sunil Wolf III, MD   Comparison: compared with previous ECG   Similar to previous ECG  Rhythm: sinus rhythm  Rate: normal  Conduction: left bundle branch block  QRS axis: left  Other: no other findings    Clinical impression: abnormal EKG               Objective:     Vitals:    06/30/22 1021   BP: 126/80   Pulse: 64   Weight: 79.8 kg (176 lb)   Height: 170.2 cm (67\")         Physical Exam  Constitutional:       General: He is not in acute distress.     Appearance: He is well-developed. He is not diaphoretic.   HENT:      Head: Normocephalic and atraumatic.      Nose: Nose normal.   Eyes:      General:         Right eye: No discharge.         Left eye: No discharge.      Conjunctiva/sclera: Conjunctivae normal.      Pupils: Pupils are equal, round, and reactive to light.   Neck:      Thyroid: No thyromegaly.      Trachea: No tracheal deviation.   Cardiovascular:      Rate and Rhythm: Normal rate and regular rhythm.      Pulses: Normal pulses.      Heart sounds: " Normal heart sounds, S1 normal and S2 normal.     No S3 sounds.   Pulmonary:      Effort: Pulmonary effort is normal. No respiratory distress.      Breath sounds: Normal breath sounds. No stridor.   Chest:      Chest wall: No tenderness.   Abdominal:      General: Bowel sounds are normal. There is no distension.      Palpations: Abdomen is soft. There is no mass.      Tenderness: There is no abdominal tenderness. There is no guarding or rebound.   Musculoskeletal:         General: No tenderness or deformity. Normal range of motion.      Cervical back: Normal range of motion and neck supple.   Lymphadenopathy:      Cervical: No cervical adenopathy.   Skin:     General: Skin is warm and dry.      Findings: No erythema or rash.   Neurological:      Mental Status: He is alert and oriented to person, place, and time.      Deep Tendon Reflexes: Reflexes are normal and symmetric.   Psychiatric:         Thought Content: Thought content normal.         Lab Review:             Results for orders placed during the hospital encounter of 05/29/19    Adult Transthoracic Echo Complete W/ Cont if Necessary Per Protocol    Interpretation Summary  · Left ventricular systolic function is normal.  · The following left ventricular wall segments are hypokinetic: apical septal, basal inferoseptal, mid inferoseptal and basal inferoseptal.  · Left ventricular wall thickness is consistent with borderline concentric hypertrophy.  · Left ventricular diastolic dysfunction (grade I) consistent with impaired relaxation.  · Calculated EF = 55.0%.            Assessment:          Diagnosis Plan   1. History of cardiomyopathy  ECG 12 Lead   2. Primary hypertension  ECG 12 Lead   3. LBBB (left bundle branch block)  ECG 12 Lead          Plan:       1.  hypertension-continue current medical regimen, excellent control on the carvedilol, and losartan.  2.  History of cardiomyopathy with recovery of function-continue current medical regimen with his  carvedilol and losartan, continue spironolactone, creatinine, BUN, potassium reviewed, doing well  3.  Obstructive sleep apnea on CPAP  4.  Diagnosed with ulcerative colitis.  He follows with GI  5. [facility]    Thank you very much for allowing us to participate in the care of this pleasant patient.  Please don't hesitate to call if I can be of assistance in any way.      Current Outpatient Medications:   •  carvedilol (COREG) 3.125 MG tablet, TAKE 1 TABLET BY MOUTH 2 TIMES A DAY AS DIRECTED., Disp: 180 tablet, Rfl: 0  •  cetirizine (zyrTEC) 10 MG tablet, TAKE 1 TABLET BY MOUTH EVERY MORNING., Disp: 90 tablet, Rfl: 0  •  LORazepam (ATIVAN) 1 MG tablet, Take 1 mg by mouth 2 (Two) Times a Day., Disp: , Rfl:   •  losartan (COZAAR) 50 MG tablet, TAKE 1/2 TABLET DAILY., Disp: 45 tablet, Rfl: 0  •  mesalamine (LIALDA) 1.2 g EC tablet, TAKE 4 TABLETS BY MOUTH DAILY., Disp: 360 tablet, Rfl: 3  •  omeprazole (priLOSEC) 40 MG capsule, TAKE 1 CAPSULE BY MOUTH DAILY., Disp: 90 capsule, Rfl: 0  •  rosuvastatin (CRESTOR) 5 MG tablet, Take 5 mg by mouth Daily., Disp: , Rfl:   •  spironolactone (ALDACTONE) 25 MG tablet, TAKE 1 TABLET BY MOUTH DAILY., Disp: 90 tablet, Rfl: 3  •  traZODone (DESYREL) 100 MG tablet, Take 100 mg by mouth Every Night., Disp: , Rfl:   •  zolpidem CR (AMBIEN CR) 12.5 MG CR tablet, Take  by mouth., Disp: , Rfl:

## 2022-08-01 DIAGNOSIS — J34.89 SINUS DRAINAGE: ICD-10-CM

## 2022-08-01 RX ORDER — CETIRIZINE HYDROCHLORIDE 10 MG/1
TABLET ORAL
Qty: 90 TABLET | Refills: 0 | Status: SHIPPED | OUTPATIENT
Start: 2022-08-01 | End: 2022-10-31

## 2022-08-01 RX ORDER — SPIRONOLACTONE 25 MG/1
TABLET ORAL
Qty: 90 TABLET | Refills: 0 | Status: SHIPPED | OUTPATIENT
Start: 2022-08-01 | End: 2022-10-31

## 2022-08-01 NOTE — TELEPHONE ENCOUNTER
Rx Refill Note  Requested Prescriptions     Pending Prescriptions Disp Refills   • spironolactone (ALDACTONE) 25 MG tablet [Pharmacy Med Name: SPIRONOLACTONE 25 MG TABLET] 90 tablet 0     Sig: TAKE 1 TABLET BY MOUTH DAILY.      Last office visit with prescribing clinician: 6/30/2022 APOLINAR     Next office visit with prescribing clinician: 7/6/2023  APOLINAR House MA  08/01/22, 10:07 EDT

## 2022-08-29 RX ORDER — LOSARTAN POTASSIUM 50 MG/1
TABLET ORAL
Qty: 45 TABLET | Refills: 2 | Status: SHIPPED | OUTPATIENT
Start: 2022-08-29

## 2022-08-29 RX ORDER — CARVEDILOL 3.12 MG/1
TABLET ORAL
Qty: 180 TABLET | Refills: 2 | Status: SHIPPED | OUTPATIENT
Start: 2022-08-29

## 2022-09-21 ENCOUNTER — LAB (OUTPATIENT)
Dept: LAB | Facility: HOSPITAL | Age: 67
End: 2022-09-21

## 2022-09-21 ENCOUNTER — OFFICE VISIT (OUTPATIENT)
Dept: GASTROENTEROLOGY | Facility: CLINIC | Age: 67
End: 2022-09-21

## 2022-09-21 VITALS
DIASTOLIC BLOOD PRESSURE: 80 MMHG | BODY MASS INDEX: 27.84 KG/M2 | WEIGHT: 177.4 LBS | SYSTOLIC BLOOD PRESSURE: 130 MMHG | HEIGHT: 67 IN

## 2022-09-21 DIAGNOSIS — K51.00 ULCERATIVE PANCOLITIS WITHOUT COMPLICATION: ICD-10-CM

## 2022-09-21 DIAGNOSIS — K62.5 RECTAL BLEEDING: Primary | ICD-10-CM

## 2022-09-21 DIAGNOSIS — Z86.19 HISTORY OF HELICOBACTER PYLORI INFECTION: ICD-10-CM

## 2022-09-21 LAB
BASOPHILS # BLD AUTO: 0.02 10*3/MM3 (ref 0–0.2)
BASOPHILS NFR BLD AUTO: 0.2 % (ref 0–1.5)
DEPRECATED RDW RBC AUTO: 38.4 FL (ref 37–54)
EOSINOPHIL # BLD AUTO: 0.12 10*3/MM3 (ref 0–0.4)
EOSINOPHIL NFR BLD AUTO: 1.1 % (ref 0.3–6.2)
ERYTHROCYTE [DISTWIDTH] IN BLOOD BY AUTOMATED COUNT: 12.9 % (ref 12.3–15.4)
HCT VFR BLD AUTO: 42.3 % (ref 37.5–51)
HGB BLD-MCNC: 14.6 G/DL (ref 13–17.7)
IMM GRANULOCYTES # BLD AUTO: 0.03 10*3/MM3 (ref 0–0.05)
IMM GRANULOCYTES NFR BLD AUTO: 0.3 % (ref 0–0.5)
LYMPHOCYTES # BLD AUTO: 0.73 10*3/MM3 (ref 0.7–3.1)
LYMPHOCYTES NFR BLD AUTO: 6.6 % (ref 19.6–45.3)
MCH RBC QN AUTO: 29.1 PG (ref 26.6–33)
MCHC RBC AUTO-ENTMCNC: 34.5 G/DL (ref 31.5–35.7)
MCV RBC AUTO: 84.3 FL (ref 79–97)
MONOCYTES # BLD AUTO: 0.84 10*3/MM3 (ref 0.1–0.9)
MONOCYTES NFR BLD AUTO: 7.6 % (ref 5–12)
NEUTROPHILS NFR BLD AUTO: 84.2 % (ref 42.7–76)
NEUTROPHILS NFR BLD AUTO: 9.25 10*3/MM3 (ref 1.7–7)
NRBC BLD AUTO-RTO: 0 /100 WBC (ref 0–0.2)
PLATELET # BLD AUTO: 208 10*3/MM3 (ref 140–450)
PMV BLD AUTO: 9.7 FL (ref 6–12)
RBC # BLD AUTO: 5.02 10*6/MM3 (ref 4.14–5.8)
WBC NRBC COR # BLD: 10.99 10*3/MM3 (ref 3.4–10.8)

## 2022-09-21 PROCEDURE — 36415 COLL VENOUS BLD VENIPUNCTURE: CPT | Performed by: INTERNAL MEDICINE

## 2022-09-21 PROCEDURE — 85025 COMPLETE CBC W/AUTO DIFF WBC: CPT | Performed by: INTERNAL MEDICINE

## 2022-09-21 PROCEDURE — 99213 OFFICE O/P EST LOW 20 MIN: CPT | Performed by: INTERNAL MEDICINE

## 2022-09-21 NOTE — PROGRESS NOTES
PATIENT INFORMATION  Teofilo Jade       - 1955    CHIEF COMPLAINT  Chief Complaint   Patient presents with   • Ulcerative Colitis   • Nausea   • Vomiting   • Difficulty Swallowing       HISTORY OF PRESENT ILLNESS  Doing well wrt UC and HP gasatritis but no t seen since  and last spring had a right flank pain that lasted 10 min and resolved w/o vomiting and hasnt recurred    Does have Post nasal ddrip worst after breakfast and responds to antihistamine    Complains of Rectal bleeding once weekly and complains of Hemorrhoids as well. Does have supp and cream for his Hemorrhoids so will continue those and add daily fiber          REVIEWED PERTINENT RESULTS/ LABS  Lab Results   Component Value Date    CASEREPORT  07/10/2020     Surgical Pathology Report                         Case: [id number]                                  Authorizing Provider:  Vini Lorenzana        Collected:           07/10/2020 10:45 AM                                 MD Lilian                                                                   Ordering Location:     [facility]   Received:            07/10/2020 01:03 PM                                 OR                                                                           Pathologist:           Azalea Salas MD                                                          Specimens:   1) - Gastric, Body, Gastric biopsies                                                                2) - Esophagus, Distal, Distal esophagus biopsies                                          FINALDX  07/10/2020     1. Stomach, Biopsy:  Antral type gastric mucosa with   A. H. PYLORI GASTRITIS.   B. No metaplasia, dysplasia nor malignancy.    2. Esophagus, Distal, Biopsy:  Squamoglandular mucosa and submucosa with    A. No definitive complete goblet cell metaplasia identified by routine staining.   B. Mild mixed inflammation with area suggesting erosion and repair.   C. No  dysplasia nor malignancy.   D. No significant esophageal eosinophilia.   E. No viral inclusions nor fungal organisms identified.    Mec/kds       Lab Results   Component Value Date    HGB 14.5 10/17/2019    MCV 84.5 10/17/2019     10/17/2019    ALT 8 10/17/2019    AST 11 10/17/2019      No results found.    REVIEW OF SYSTEMS  Review of Systems   Constitutional: Negative for activity change, chills, fever and unexpected weight change.   HENT: Negative for congestion.    Eyes: Negative for visual disturbance.   Respiratory: Negative for shortness of breath.    Cardiovascular: Negative for chest pain and palpitations.   Gastrointestinal: Positive for anal bleeding, blood in stool, diarrhea, nausea and vomiting. Negative for abdominal pain (RUQ/flank).   Endocrine: Negative for cold intolerance and heat intolerance.   Genitourinary: Negative for hematuria.   Musculoskeletal: Negative for gait problem.   Skin: Negative for color change.   Allergic/Immunologic: Negative for immunocompromised state.   Neurological: Negative for weakness and light-headedness.   Hematological: Negative for adenopathy.   Psychiatric/Behavioral: Negative for sleep disturbance. The patient is not nervous/anxious.          ACTIVE PROBLEMS  Patient Active Problem List    Diagnosis    • LBBB (left bundle branch block) [I44.7]    • Dysphagia [R13.10]    • Non-intractable vomiting [R11.10]    • History of cardiomyopathy [Z86.79]    • Ulcerative pancolitis without complication (HCC) [K51.00]    • Rectal bleeding [K62.5]    • Generalized abdominal pain [R10.84]    • Diarrhea [R19.7]    • Hypertension [I10]    • SEBASTIAN (obstructive sleep apnea) [G47.33]          PAST MEDICAL HISTORY  Past Medical History:   Diagnosis Date   • Anxiety    • Colon polyp    • History of cardiomyopathy 3/28/2018   • Hypertension    • Nonischemic cardiomyopathy (HCC)    • SEBASTIAN (obstructive sleep apnea)    • Ulcer of abdomen wall (HCC)          SURGICAL HISTORY  Past  Surgical History:   Procedure Laterality Date   • COLONOSCOPY     • COLONOSCOPY N/A 10/2/2017    Procedure: COLONOSCOPY, biopsy; polypectomy;  Surgeon: Sushila Amezquita MD;  Location: [facility] OR;  Service:    • ENDOSCOPY N/A 7/10/2020    Procedure: ESOPHAGOGASTRODUODENOSCOPY, biopsy;  Surgeon: Vini Lorenzana MD;  Location: [facility] OR;  Service: Gastroenterology;  Laterality: N/A;  Ulcerative esophagitis; Biopsies- gastric and distal esophagus; gastritis   • KNEE SURGERY  2002 2003    both knee         FAMILY HISTORY  Family History   Problem Relation Age of Onset   • Heart disease Mother    • Diabetes Mother    • Stroke Mother    • Hypertension Mother    • Colon polyps Mother    • Heart disease Father    • Diabetes Father          SOCIAL HISTORY  Social History     Occupational History   • Not on file   Tobacco Use   • Smoking status: Former Smoker   • Smokeless tobacco: Never Used   • Tobacco comment: caffine 3 cups daily - quit 5 yrs ago   Vaping Use   • Vaping Use: Never used   Substance and Sexual Activity   • Alcohol use: Not Currently     Comment: occ    • Drug use: No   • Sexual activity: Defer         CURRENT MEDICATIONS    Current Outpatient Medications:   •  carvedilol (COREG) 3.125 MG tablet, TAKE 1 TABLET BY MOUTH 2 TIMES A DAY AS DIRECTED., Disp: 180 tablet, Rfl: 2  •  cetirizine (zyrTEC) 10 MG tablet, TAKE 1 TABLET BY MOUTH EVERY MORNING., Disp: 90 tablet, Rfl: 0  •  LORazepam (ATIVAN) 1 MG tablet, Take 1 mg by mouth 2 (Two) Times a Day., Disp: , Rfl:   •  losartan (COZAAR) 50 MG tablet, TAKE 1/2 TABLET DAILY., Disp: 45 tablet, Rfl: 2  •  mesalamine (LIALDA) 1.2 g EC tablet, TAKE 4 TABLETS BY MOUTH DAILY., Disp: 360 tablet, Rfl: 3  •  omeprazole (priLOSEC) 40 MG capsule, TAKE 1 CAPSULE BY MOUTH DAILY., Disp: 90 capsule, Rfl: 0  •  rosuvastatin (CRESTOR) 5 MG tablet, Take 5 mg by mouth Daily., Disp: , Rfl:   •  spironolactone (ALDACTONE) 25 MG tablet, TAKE 1 TABLET BY MOUTH DAILY., Disp: 90  "tablet, Rfl: 0  •  traZODone (DESYREL) 100 MG tablet, Take 100 mg by mouth Every Night., Disp: , Rfl:   •  zolpidem CR (AMBIEN CR) 12.5 MG CR tablet, Take  by mouth., Disp: , Rfl:     ALLERGIES  Warfarin    VITALS  Vitals:    09/21/22 1105   BP: 130/80   BP Location: Left arm   Patient Position: Sitting   Cuff Size: Large Adult   Weight: 80.5 kg (177 lb 6.4 oz)   Height: 170.2 cm (67\")       PHYSICAL EXAM  Debilities/Disabilities Identified: None  Emotional Behavior: Appropriate  Wt Readings from Last 3 Encounters:   09/21/22 80.5 kg (177 lb 6.4 oz)   06/30/22 79.8 kg (176 lb)   06/24/21 82.6 kg (182 lb)     Ht Readings from Last 1 Encounters:   09/21/22 170.2 cm (67\")     Body mass index is 27.78 kg/m².  Physical Exam  Constitutional:       Appearance: He is well-developed. He is not diaphoretic.   Eyes:      General: No scleral icterus.     Conjunctiva/sclera: Conjunctivae normal.      Pupils: Pupils are equal, round, and reactive to light.   Neck:      Thyroid: No thyromegaly.   Cardiovascular:      Rate and Rhythm: Normal rate and regular rhythm.      Heart sounds: Normal heart sounds. No murmur heard.    No gallop.   Pulmonary:      Effort: Pulmonary effort is normal.      Breath sounds: Normal breath sounds. No wheezing or rales.   Abdominal:      General: Bowel sounds are normal. There is no distension or abdominal bruit.      Palpations: Abdomen is soft. There is no shifting dullness, fluid wave or mass.      Tenderness: There is no abdominal tenderness. There is no guarding. Negative signs include Mendoza's sign.      Hernia: There is no hernia in the ventral area.   Musculoskeletal:         General: Normal range of motion.      Cervical back: Normal range of motion and neck supple.   Lymphadenopathy:      Cervical: No cervical adenopathy.   Skin:     General: Skin is warm and dry.      Findings: No erythema or rash.   Neurological:      Mental Status: He is alert and oriented to person, place, and time. "         CLINICAL DATA REVIEWED   reviewed previous lab results and integrated with today's visit, reviewed notes from other physicians and/or last GI encounter, reviewed previous endoscopy results and available photos, reviewed surgical pathology results from previous biopsies    ASSESSMENT  Diagnoses and all orders for this visit:    Rectal bleeding  -     CBC & Differential    Ulcerative pancolitis without complication (HCC)    History of Helicobacter pylori infection          PLAN  Add Fiber and use creams more aggressively and will base further w/u on his CBC  Return in about 6 months (around 3/21/2023).    I have discussed the above plan with the patient.  They verbalize understanding and are in agreement with the plan.  They have been advised to contact the office for any questions, concerns, or changes related to their health.

## 2022-09-26 RX ORDER — OMEPRAZOLE 40 MG/1
40 CAPSULE, DELAYED RELEASE ORAL DAILY
Qty: 90 CAPSULE | Refills: 2 | Status: SHIPPED | OUTPATIENT
Start: 2022-09-26

## 2022-10-31 DIAGNOSIS — J34.89 SINUS DRAINAGE: ICD-10-CM

## 2022-10-31 RX ORDER — CETIRIZINE HYDROCHLORIDE 10 MG/1
TABLET ORAL
Qty: 90 TABLET | Refills: 1 | Status: SHIPPED | OUTPATIENT
Start: 2022-10-31

## 2022-10-31 RX ORDER — SPIRONOLACTONE 25 MG/1
TABLET ORAL
Qty: 90 TABLET | Refills: 2 | Status: SHIPPED | OUTPATIENT
Start: 2022-10-31

## 2022-11-08 ENCOUNTER — TELEPHONE (OUTPATIENT)
Dept: GASTROENTEROLOGY | Facility: CLINIC | Age: 67
End: 2022-11-08

## 2022-11-08 RX ORDER — MESALAMINE 1.2 G/1
4.8 TABLET, DELAYED RELEASE ORAL DAILY
Qty: 360 TABLET | Refills: 0 | Status: SHIPPED | OUTPATIENT
Start: 2022-11-08

## 2022-11-09 NOTE — TELEPHONE ENCOUNTER
Medication not covered under medicare  Part D plan;    Options included Sulfasalazine, Balsalazid, Mesalamine 0.375mg.    Forward to provider

## 2022-11-10 RX ORDER — MESALAMINE 0.38 G/1
1500 CAPSULE, EXTENDED RELEASE ORAL DAILY
Qty: 120 CAPSULE | Refills: 11 | Status: SHIPPED | OUTPATIENT
Start: 2022-11-10

## 2023-03-21 ENCOUNTER — TELEPHONE (OUTPATIENT)
Dept: GASTROENTEROLOGY | Facility: CLINIC | Age: 68
End: 2023-03-21

## 2023-03-21 NOTE — TELEPHONE ENCOUNTER
Caller: Teofilo Jade    Relationship: Self    Best call back number: [phone]    What medications are you currently taking:   MESALAMINE  APRISO ER     Current Outpatient Medications on File Prior to Visit   Medication Sig Dispense Refill   • carvedilol (COREG) 3.125 MG tablet TAKE 1 TABLET BY MOUTH 2 TIMES A DAY AS DIRECTED. 180 tablet 2   • cetirizine (zyrTEC) 10 MG tablet TAKE 1 TABLET BY MOUTH EVERY MORNING. 90 tablet 1   • LORazepam (ATIVAN) 1 MG tablet Take 1 mg by mouth 2 (Two) Times a Day.     • losartan (COZAAR) 50 MG tablet TAKE 1/2 TABLET DAILY. 45 tablet 2   • mesalamine (Apriso) 0.375 g 24 hr capsule Take 4 capsules by mouth Daily. 120 capsule 11   • mesalamine (LIALDA) 1.2 g EC tablet TAKE 4 TABLETS BY MOUTH DAILY. 360 tablet 0   • omeprazole (priLOSEC) 40 MG capsule TAKE 1 CAPSULE BY MOUTH DAILY. 90 capsule 2   • rosuvastatin (CRESTOR) 5 MG tablet Take 5 mg by mouth Daily.     • spironolactone (ALDACTONE) 25 MG tablet TAKE 1 TABLET BY MOUTH DAILY. 90 tablet 2   • traZODone (DESYREL) 100 MG tablet Take 100 mg by mouth Every Night.     • zolpidem CR (AMBIEN CR) 12.5 MG CR tablet Take  by mouth.       No current facility-administered medications on file prior to visit.          When did you start taking these medications: 11/10/22   Which medication are you concerned about: MESALAMINE  (APRISO) ER     Who prescribed you this medication: .     What are your concerns: MEDICATION NOT TAKING EFFECT AND IS LOOKING FOR AN ALTERNATIVE. PATIENT REQUESTING CALL BACK TO DISCUSS AND CHANGE MEDICATION     How long have you had these concerns: 11/10/22

## 2023-03-21 NOTE — TELEPHONE ENCOUNTER
Pt currently taking 4 per day   States concern at times urge in rectum but doesn't have BM.   Moved up appt

## 2023-03-21 NOTE — TELEPHONE ENCOUNTER
Pt currently in [place]     Currently having 3-4 bms daily- loose and consistent urge.   No blood.    LOV 09/21/22    In Nov pt switched from Lialda to Apriso due to Medicare D    Options included Sulfasalazine, Balsalazid, Mesalamine 0.375mg.    Sent to [facility] for recommendations until pt returns from [place] and follow up on April 24th.

## 2023-03-21 NOTE — TELEPHONE ENCOUNTER
Per BRITT    Make sure he is taking 4 of which ever mesalamine he is on and after 2 BMs a day can use imodium

## 2023-04-11 ENCOUNTER — OFFICE VISIT (OUTPATIENT)
Dept: GASTROENTEROLOGY | Facility: CLINIC | Age: 68
End: 2023-04-11
Payer: MEDICARE

## 2023-04-11 ENCOUNTER — LAB (OUTPATIENT)
Dept: LAB | Facility: HOSPITAL | Age: 68
End: 2023-04-11
Payer: MEDICARE

## 2023-04-11 VITALS
BODY MASS INDEX: 27.62 KG/M2 | WEIGHT: 176 LBS | DIASTOLIC BLOOD PRESSURE: 74 MMHG | HEIGHT: 67 IN | SYSTOLIC BLOOD PRESSURE: 136 MMHG

## 2023-04-11 DIAGNOSIS — J34.89 NASAL DRAINAGE: ICD-10-CM

## 2023-04-11 DIAGNOSIS — Z09 ENCOUNTER FOR FOLLOW-UP EXAMINATION AFTER COMPLETED TREATMENT FOR CONDITIONS OTHER THAN MALIGNANT NEOPLASM: ICD-10-CM

## 2023-04-11 DIAGNOSIS — K51.00 ULCERATIVE PANCOLITIS WITHOUT COMPLICATION: ICD-10-CM

## 2023-04-11 DIAGNOSIS — K51.911 ULCERATIVE COLITIS WITH RECTAL BLEEDING, UNSPECIFIED LOCATION: Primary | ICD-10-CM

## 2023-04-11 LAB
CRP SERPL-MCNC: <0.3 MG/DL (ref 0–0.5)
DEPRECATED RDW RBC AUTO: 39 FL (ref 37–54)
ERYTHROCYTE [DISTWIDTH] IN BLOOD BY AUTOMATED COUNT: 12.8 % (ref 12.3–15.4)
HCT VFR BLD AUTO: 41.6 % (ref 37.5–51)
HGB BLD-MCNC: 14.6 G/DL (ref 13–17.7)
MCH RBC QN AUTO: 29.2 PG (ref 26.6–33)
MCHC RBC AUTO-ENTMCNC: 35.1 G/DL (ref 31.5–35.7)
MCV RBC AUTO: 83.2 FL (ref 79–97)
PLATELET # BLD AUTO: 226 10*3/MM3 (ref 140–450)
PMV BLD AUTO: 9.7 FL (ref 6–12)
RBC # BLD AUTO: 5 10*6/MM3 (ref 4.14–5.8)
WBC NRBC COR # BLD: 6.02 10*3/MM3 (ref 3.4–10.8)

## 2023-04-11 PROCEDURE — 86140 C-REACTIVE PROTEIN: CPT

## 2023-04-11 PROCEDURE — 1160F RVW MEDS BY RX/DR IN RCRD: CPT

## 2023-04-11 PROCEDURE — 3078F DIAST BP <80 MM HG: CPT

## 2023-04-11 PROCEDURE — 36415 COLL VENOUS BLD VENIPUNCTURE: CPT

## 2023-04-11 PROCEDURE — 3075F SYST BP GE 130 - 139MM HG: CPT

## 2023-04-11 PROCEDURE — 99214 OFFICE O/P EST MOD 30 MIN: CPT

## 2023-04-11 PROCEDURE — 1159F MED LIST DOCD IN RCRD: CPT

## 2023-04-11 PROCEDURE — 85027 COMPLETE CBC AUTOMATED: CPT

## 2023-04-11 NOTE — PROGRESS NOTES
PATIENT INFORMATION  Teofilo Jaed       - 1955    CHIEF COMPLAINT  Chief Complaint   Patient presents with   • Ulcerative Colitis   • Diarrhea       HISTORY OF PRESENT ILLNESS  Here for UC follow-up    Previously controlled on lialda, but medicare refused coverage, switched to Apriso and has struggled since. Is taking 4 a day. Complaining of tenesmus frequently. Previously 1 easy BM every day, runny. Now 2-3 a day with frequent urges in between, sometimes cannot get through meal without having to run to the bathroom. Intermittent bleeding when wiping, small amount feels unchanged hemorrhoids, worse after heavy lifting they can pop out and he has to manually reinsert. No abdominal pain. 8-8:30 every morning 1 BM, then another 12-1.    Last seen 2021 was doing well, once weekly rectal bleeding that was more consistent with hemorrhoids, treating with supp and cream. Recommended to add daily fiber.    GERD, frequent drainage in throat when eating, sometimes gagging. 2-3 times a week, but with nose running. Cetirizine has helped. No other breakthrough symptoms. Recommended flonase.    7/10/2020 last EGD for dysphagia positive for ulcerative esophagitis, HP gastritis, negative for Barretts.     Last colon 10/2/2017 with UC active in ascending colon.      REVIEWED PERTINENT RESULTS/ LABS  Lab Results   Component Value Date    CASEREPORT  07/10/2020     Surgical Pathology Report                         Case: [id number]                                  Authorizing Provider:  Vini Lorenzana        Collected:           07/10/2020 10:45 AM                                 MD Lilian                                                                   Ordering Location:     [facility]   Received:            07/10/2020 01:03 PM                                 OR                                                                           Pathologist:           Azalea Salas MD                                                           Specimens:   1) - Gastric, Body, Gastric biopsies                                                                2) - Esophagus, Distal, Distal esophagus biopsies                                          FINALDX  07/10/2020     1. Stomach, Biopsy:  Antral type gastric mucosa with   A. H. PYLORI GASTRITIS.   B. No metaplasia, dysplasia nor malignancy.    2. Esophagus, Distal, Biopsy:  Squamoglandular mucosa and submucosa with    A. No definitive complete goblet cell metaplasia identified by routine staining.   B. Mild mixed inflammation with area suggesting erosion and repair.   C. No dysplasia nor malignancy.   D. No significant esophageal eosinophilia.   E. No viral inclusions nor fungal organisms identified.    Mec/kds       Lab Results   Component Value Date    HGB 14.6 09/21/2022    MCV 84.3 09/21/2022     09/21/2022    ALT 8 10/17/2019    AST 11 10/17/2019      No results found.    REVIEW OF SYSTEMS  Review of Systems   Constitutional: Positive for appetite change (due to COVID) and fatigue.   HENT: Positive for congestion.    Eyes: Negative.    Respiratory: Positive for choking.    Cardiovascular: Negative.    Gastrointestinal: Positive for diarrhea, nausea and vomiting. Negative for abdominal distention and abdominal pain.        UC    Endocrine: Negative.    Genitourinary: Negative.    Musculoskeletal: Negative.    Skin: Negative.    Allergic/Immunologic: Negative.    Neurological: Positive for headaches.   Hematological: Negative.    Psychiatric/Behavioral: Negative.          ACTIVE PROBLEMS  Patient Active Problem List    Diagnosis    • Nasal drainage [J34.89]    • LBBB (left bundle branch block) [I44.7]    • Dysphagia [R13.10]    • Non-intractable vomiting [R11.10]    • History of cardiomyopathy [Z86.79]    • Ulcerative pancolitis without complication [K51.00]    • Rectal bleeding [K62.5]    • Generalized abdominal pain [R10.84]    • Diarrhea [R19.7]    •  Hypertension [I10]    • SEBASTIAN (obstructive sleep apnea) [G47.33]          PAST MEDICAL HISTORY  Past Medical History:   Diagnosis Date   • Anxiety    • Colon polyp    • History of cardiomyopathy 3/28/2018   • Hypertension    • Nonischemic cardiomyopathy    • SEBASTIAN (obstructive sleep apnea)    • Ulcer of abdomen wall          SURGICAL HISTORY  Past Surgical History:   Procedure Laterality Date   • COLONOSCOPY     • COLONOSCOPY N/A 10/2/2017    Procedure: COLONOSCOPY, biopsy; polypectomy;  Surgeon: Sushila Amezquita MD;  Location: [facility] OR;  Service:    • ENDOSCOPY N/A 7/10/2020    Procedure: ESOPHAGOGASTRODUODENOSCOPY, biopsy;  Surgeon: Vini Lorenzana MD;  Location: [facility] OR;  Service: Gastroenterology;  Laterality: N/A;  Ulcerative esophagitis; Biopsies- gastric and distal esophagus; gastritis   • KNEE SURGERY  2002 2003    both knee         FAMILY HISTORY  Family History   Problem Relation Age of Onset   • Heart disease Mother    • Diabetes Mother    • Stroke Mother    • Hypertension Mother    • Colon polyps Mother    • Heart disease Father    • Diabetes Father          SOCIAL HISTORY  Social History     Occupational History   • Not on file   Tobacco Use   • Smoking status: Former   • Smokeless tobacco: Never   • Tobacco comments:     caffine 3 cups daily - quit 5 yrs ago   Vaping Use   • Vaping Use: Never used   Substance and Sexual Activity   • Alcohol use: Not Currently     Comment: occ    • Drug use: No   • Sexual activity: Defer         CURRENT MEDICATIONS    Current Outpatient Medications:   •  carvedilol (COREG) 3.125 MG tablet, TAKE 1 TABLET BY MOUTH 2 TIMES A DAY AS DIRECTED., Disp: 180 tablet, Rfl: 2  •  cetirizine (zyrTEC) 10 MG tablet, TAKE 1 TABLET BY MOUTH EVERY MORNING., Disp: 90 tablet, Rfl: 1  •  LORazepam (ATIVAN) 1 MG tablet, Take 1 tablet by mouth 2 (Two) Times a Day., Disp: , Rfl:   •  losartan (COZAAR) 50 MG tablet, TAKE 1/2 TABLET DAILY., Disp: 45 tablet, Rfl: 2  •  mesalamine  "(Apriso) 0.375 g 24 hr capsule, Take 4 capsules by mouth Daily., Disp: 120 capsule, Rfl: 11  •  mesalamine (LIALDA) 1.2 g EC tablet, TAKE 4 TABLETS BY MOUTH DAILY., Disp: 360 tablet, Rfl: 0  •  omeprazole (priLOSEC) 40 MG capsule, TAKE 1 CAPSULE BY MOUTH DAILY., Disp: 90 capsule, Rfl: 2  •  rosuvastatin (CRESTOR) 5 MG tablet, Take 1 tablet by mouth Daily., Disp: , Rfl:   •  spironolactone (ALDACTONE) 25 MG tablet, TAKE 1 TABLET BY MOUTH DAILY., Disp: 90 tablet, Rfl: 2  •  traZODone (DESYREL) 100 MG tablet, Take 1 tablet by mouth Every Night., Disp: , Rfl:   •  zolpidem CR (AMBIEN CR) 12.5 MG CR tablet, Take  by mouth., Disp: , Rfl:     ALLERGIES  Warfarin    VITALS  Vitals:    04/11/23 1310   BP: 136/74   BP Location: Left arm   Patient Position: Sitting   Cuff Size: Adult   Weight: 79.8 kg (176 lb)   Height: 170.2 cm (67.01\")       PHYSICAL EXAM  Debilities/Disabilities Identified: None  Emotional Behavior: Appropriate  Wt Readings from Last 3 Encounters:   04/11/23 79.8 kg (176 lb)   09/21/22 80.5 kg (177 lb 6.4 oz)   06/30/22 79.8 kg (176 lb)     Ht Readings from Last 1 Encounters:   04/11/23 170.2 cm (67.01\")     Body mass index is 27.56 kg/m².  Physical Exam  Constitutional:       General: He is not in acute distress.     Appearance: Normal appearance. He is not ill-appearing.   HENT:      Head: Normocephalic and atraumatic.      Mouth/Throat:      Mouth: Mucous membranes are moist.      Pharynx: No posterior oropharyngeal erythema.   Eyes:      General: No scleral icterus.  Cardiovascular:      Rate and Rhythm: Normal rate and regular rhythm.      Heart sounds: Normal heart sounds.   Pulmonary:      Effort: Pulmonary effort is normal.      Breath sounds: Normal breath sounds.   Abdominal:      General: Abdomen is flat. Bowel sounds are normal. There is no distension.      Palpations: Abdomen is soft. There is no mass.      Tenderness: There is no abdominal tenderness. There is no guarding or rebound. Negative " signs include Mendoza's sign.      Hernia: No hernia is present.   Musculoskeletal:      Cervical back: Neck supple.   Skin:     General: Skin is warm.      Capillary Refill: Capillary refill takes less than 2 seconds.   Neurological:      General: No focal deficit present.      Mental Status: He is alert and oriented to person, place, and time.   Psychiatric:         Mood and Affect: Mood normal.         Behavior: Behavior normal.         Thought Content: Thought content normal.         Judgment: Judgment normal.         CLINICAL DATA REVIEWED   reviewed previous lab results and integrated with today's visit, reviewed notes from other physicians and/or last GI encounter, reviewed previous endoscopy results and available photos, reviewed surgical pathology results from previous biopsies    ASSESSMENT  Diagnoses and all orders for this visit:    Ulcerative colitis with rectal bleeding, unspecified location  -     CBC (No Diff)  -     C-reactive Protein  -     Calprotectin, Fecal - Stool, Per Rectum  -     dexa bone density axial; Future    Encounter for follow-up examination after completed treatment for conditions other than malignant neoplasm  -     dexa bone density axial; Future    Ulcerative pancolitis without complication    Nasal drainage          PLAN    Check labs, treat with budesonide if elevated and schedule colon  Screening dexa    Return in about 3 months (around 7/11/2023).    I have discussed the above plan with the patient.  They verbalize understanding and are in agreement with the plan.  They have been advised to contact the office for any questions, concerns, or changes related to their health.

## 2023-04-11 NOTE — H&P (VIEW-ONLY)
PATIENT INFORMATION  Teofilo Jade       - 1955    CHIEF COMPLAINT  Chief Complaint   Patient presents with   • Ulcerative Colitis   • Diarrhea       HISTORY OF PRESENT ILLNESS  Here for UC follow-up    Previously controlled on lialda, but medicare refused coverage, switched to Apriso and has struggled since. Is taking 4 a day. Complaining of tenesmus frequently. Previously 1 easy BM every day, runny. Now 2-3 a day with frequent urges in between, sometimes cannot get through meal without having to run to the bathroom. Intermittent bleeding when wiping, small amount feels unchanged hemorrhoids, worse after heavy lifting they can pop out and he has to manually reinsert. No abdominal pain. 8-8:30 every morning 1 BM, then another 12-1.    Last seen 2021 was doing well, once weekly rectal bleeding that was more consistent with hemorrhoids, treating with supp and cream. Recommended to add daily fiber.    GERD, frequent drainage in throat when eating, sometimes gagging. 2-3 times a week, but with nose running. Cetirizine has helped. No other breakthrough symptoms. Recommended flonase.    7/10/2020 last EGD for dysphagia positive for ulcerative esophagitis, HP gastritis, negative for Barretts.     Last colon 10/2/2017 with UC active in ascending colon.      REVIEWED PERTINENT RESULTS/ LABS  Lab Results   Component Value Date    CASEREPORT  07/10/2020     Surgical Pathology Report                         Case: [id number]                                  Authorizing Provider:  Vini Lorenzana        Collected:           07/10/2020 10:45 AM                                 MD Lilian                                                                   Ordering Location:     [facility]   Received:            07/10/2020 01:03 PM                                 OR                                                                           Pathologist:           Azalea Salas MD                                                           Specimens:   1) - Gastric, Body, Gastric biopsies                                                                2) - Esophagus, Distal, Distal esophagus biopsies                                          FINALDX  07/10/2020     1. Stomach, Biopsy:  Antral type gastric mucosa with   A. H. PYLORI GASTRITIS.   B. No metaplasia, dysplasia nor malignancy.    2. Esophagus, Distal, Biopsy:  Squamoglandular mucosa and submucosa with    A. No definitive complete goblet cell metaplasia identified by routine staining.   B. Mild mixed inflammation with area suggesting erosion and repair.   C. No dysplasia nor malignancy.   D. No significant esophageal eosinophilia.   E. No viral inclusions nor fungal organisms identified.    Mec/kds       Lab Results   Component Value Date    HGB 14.6 09/21/2022    MCV 84.3 09/21/2022     09/21/2022    ALT 8 10/17/2019    AST 11 10/17/2019      No results found.    REVIEW OF SYSTEMS  Review of Systems   Constitutional: Positive for appetite change (due to COVID) and fatigue.   HENT: Positive for congestion.    Eyes: Negative.    Respiratory: Positive for choking.    Cardiovascular: Negative.    Gastrointestinal: Positive for diarrhea, nausea and vomiting. Negative for abdominal distention and abdominal pain.        UC    Endocrine: Negative.    Genitourinary: Negative.    Musculoskeletal: Negative.    Skin: Negative.    Allergic/Immunologic: Negative.    Neurological: Positive for headaches.   Hematological: Negative.    Psychiatric/Behavioral: Negative.          ACTIVE PROBLEMS  Patient Active Problem List    Diagnosis    • Nasal drainage [J34.89]    • LBBB (left bundle branch block) [I44.7]    • Dysphagia [R13.10]    • Non-intractable vomiting [R11.10]    • History of cardiomyopathy [Z86.79]    • Ulcerative pancolitis without complication [K51.00]    • Rectal bleeding [K62.5]    • Generalized abdominal pain [R10.84]    • Diarrhea [R19.7]    •  Hypertension [I10]    • SEBASTIAN (obstructive sleep apnea) [G47.33]          PAST MEDICAL HISTORY  Past Medical History:   Diagnosis Date   • Anxiety    • Colon polyp    • History of cardiomyopathy 3/28/2018   • Hypertension    • Nonischemic cardiomyopathy    • SEBASTIAN (obstructive sleep apnea)    • Ulcer of abdomen wall          SURGICAL HISTORY  Past Surgical History:   Procedure Laterality Date   • COLONOSCOPY     • COLONOSCOPY N/A 10/2/2017    Procedure: COLONOSCOPY, biopsy; polypectomy;  Surgeon: Sushila Amezquita MD;  Location: [facility] OR;  Service:    • ENDOSCOPY N/A 7/10/2020    Procedure: ESOPHAGOGASTRODUODENOSCOPY, biopsy;  Surgeon: Vini Lorenzana MD;  Location: [facility] OR;  Service: Gastroenterology;  Laterality: N/A;  Ulcerative esophagitis; Biopsies- gastric and distal esophagus; gastritis   • KNEE SURGERY  2002 2003    both knee         FAMILY HISTORY  Family History   Problem Relation Age of Onset   • Heart disease Mother    • Diabetes Mother    • Stroke Mother    • Hypertension Mother    • Colon polyps Mother    • Heart disease Father    • Diabetes Father          SOCIAL HISTORY  Social History     Occupational History   • Not on file   Tobacco Use   • Smoking status: Former   • Smokeless tobacco: Never   • Tobacco comments:     caffine 3 cups daily - quit 5 yrs ago   Vaping Use   • Vaping Use: Never used   Substance and Sexual Activity   • Alcohol use: Not Currently     Comment: occ    • Drug use: No   • Sexual activity: Defer         CURRENT MEDICATIONS    Current Outpatient Medications:   •  carvedilol (COREG) 3.125 MG tablet, TAKE 1 TABLET BY MOUTH 2 TIMES A DAY AS DIRECTED., Disp: 180 tablet, Rfl: 2  •  cetirizine (zyrTEC) 10 MG tablet, TAKE 1 TABLET BY MOUTH EVERY MORNING., Disp: 90 tablet, Rfl: 1  •  LORazepam (ATIVAN) 1 MG tablet, Take 1 tablet by mouth 2 (Two) Times a Day., Disp: , Rfl:   •  losartan (COZAAR) 50 MG tablet, TAKE 1/2 TABLET DAILY., Disp: 45 tablet, Rfl: 2  •  mesalamine  "(Apriso) 0.375 g 24 hr capsule, Take 4 capsules by mouth Daily., Disp: 120 capsule, Rfl: 11  •  mesalamine (LIALDA) 1.2 g EC tablet, TAKE 4 TABLETS BY MOUTH DAILY., Disp: 360 tablet, Rfl: 0  •  omeprazole (priLOSEC) 40 MG capsule, TAKE 1 CAPSULE BY MOUTH DAILY., Disp: 90 capsule, Rfl: 2  •  rosuvastatin (CRESTOR) 5 MG tablet, Take 1 tablet by mouth Daily., Disp: , Rfl:   •  spironolactone (ALDACTONE) 25 MG tablet, TAKE 1 TABLET BY MOUTH DAILY., Disp: 90 tablet, Rfl: 2  •  traZODone (DESYREL) 100 MG tablet, Take 1 tablet by mouth Every Night., Disp: , Rfl:   •  zolpidem CR (AMBIEN CR) 12.5 MG CR tablet, Take  by mouth., Disp: , Rfl:     ALLERGIES  Warfarin    VITALS  Vitals:    04/11/23 1310   BP: 136/74   BP Location: Left arm   Patient Position: Sitting   Cuff Size: Adult   Weight: 79.8 kg (176 lb)   Height: 170.2 cm (67.01\")       PHYSICAL EXAM  Debilities/Disabilities Identified: None  Emotional Behavior: Appropriate  Wt Readings from Last 3 Encounters:   04/11/23 79.8 kg (176 lb)   09/21/22 80.5 kg (177 lb 6.4 oz)   06/30/22 79.8 kg (176 lb)     Ht Readings from Last 1 Encounters:   04/11/23 170.2 cm (67.01\")     Body mass index is 27.56 kg/m².  Physical Exam  Constitutional:       General: He is not in acute distress.     Appearance: Normal appearance. He is not ill-appearing.   HENT:      Head: Normocephalic and atraumatic.      Mouth/Throat:      Mouth: Mucous membranes are moist.      Pharynx: No posterior oropharyngeal erythema.   Eyes:      General: No scleral icterus.  Cardiovascular:      Rate and Rhythm: Normal rate and regular rhythm.      Heart sounds: Normal heart sounds.   Pulmonary:      Effort: Pulmonary effort is normal.      Breath sounds: Normal breath sounds.   Abdominal:      General: Abdomen is flat. Bowel sounds are normal. There is no distension.      Palpations: Abdomen is soft. There is no mass.      Tenderness: There is no abdominal tenderness. There is no guarding or rebound. Negative " signs include Mendoza's sign.      Hernia: No hernia is present.   Musculoskeletal:      Cervical back: Neck supple.   Skin:     General: Skin is warm.      Capillary Refill: Capillary refill takes less than 2 seconds.   Neurological:      General: No focal deficit present.      Mental Status: He is alert and oriented to person, place, and time.   Psychiatric:         Mood and Affect: Mood normal.         Behavior: Behavior normal.         Thought Content: Thought content normal.         Judgment: Judgment normal.         CLINICAL DATA REVIEWED   reviewed previous lab results and integrated with today's visit, reviewed notes from other physicians and/or last GI encounter, reviewed previous endoscopy results and available photos, reviewed surgical pathology results from previous biopsies    ASSESSMENT  Diagnoses and all orders for this visit:    Ulcerative colitis with rectal bleeding, unspecified location  -     CBC (No Diff)  -     C-reactive Protein  -     Calprotectin, Fecal - Stool, Per Rectum  -     dexa bone density axial; Future    Encounter for follow-up examination after completed treatment for conditions other than malignant neoplasm  -     dexa bone density axial; Future    Ulcerative pancolitis without complication    Nasal drainage          PLAN    Check labs, treat with budesonide if elevated and schedule colon  Screening dexa    Return in about 3 months (around 7/11/2023).    I have discussed the above plan with the patient.  They verbalize understanding and are in agreement with the plan.  They have been advised to contact the office for any questions, concerns, or changes related to their health.

## 2023-04-12 ENCOUNTER — LAB (OUTPATIENT)
Dept: LAB | Facility: HOSPITAL | Age: 68
End: 2023-04-12
Payer: MEDICARE

## 2023-04-12 PROCEDURE — 83993 ASSAY FOR CALPROTECTIN FECAL: CPT

## 2023-04-14 LAB — CALPROTECTIN STL-MCNT: 106 UG/G (ref 0–120)

## 2023-04-17 DIAGNOSIS — K51.911 ULCERATIVE COLITIS WITH RECTAL BLEEDING, UNSPECIFIED LOCATION: Primary | ICD-10-CM

## 2023-04-20 ENCOUNTER — APPOINTMENT (OUTPATIENT)
Dept: BONE DENSITY | Facility: HOSPITAL | Age: 68
End: 2023-04-20
Payer: MEDICARE

## 2023-04-20 DIAGNOSIS — Z09 ENCOUNTER FOR FOLLOW-UP EXAMINATION AFTER COMPLETED TREATMENT FOR CONDITIONS OTHER THAN MALIGNANT NEOPLASM: ICD-10-CM

## 2023-04-20 DIAGNOSIS — K51.911 ULCERATIVE COLITIS WITH RECTAL BLEEDING, UNSPECIFIED LOCATION: ICD-10-CM

## 2023-04-20 PROCEDURE — 77080 DXA BONE DENSITY AXIAL: CPT

## 2023-04-24 ENCOUNTER — TELEPHONE (OUTPATIENT)
Dept: GASTROENTEROLOGY | Facility: CLINIC | Age: 68
End: 2023-04-24
Payer: MEDICARE

## 2023-04-24 DIAGNOSIS — J34.89 SINUS DRAINAGE: ICD-10-CM

## 2023-04-24 RX ORDER — CETIRIZINE HYDROCHLORIDE 10 MG/1
TABLET ORAL
Qty: 90 TABLET | Refills: 0 | Status: SHIPPED | OUTPATIENT
Start: 2023-04-24

## 2023-04-24 NOTE — TELEPHONE ENCOUNTER
"PT called to say that the meds he's taking the:  mesalamine (Apriso) 0.375 g 24 hr capsule [24502] (Order [id number])    Isn't working. He stated he and Leela spoke about getting a new/different med and he would like something else.  He not getting any relief or stopping the \"urgency feeling\" he has.    -Please call back at - [phone]  "

## 2023-04-25 ENCOUNTER — TELEPHONE (OUTPATIENT)
Dept: GASTROENTEROLOGY | Facility: CLINIC | Age: 68
End: 2023-04-25
Payer: MEDICARE

## 2023-04-25 DIAGNOSIS — K51.911 ULCERATIVE COLITIS WITH RECTAL BLEEDING, UNSPECIFIED LOCATION: Primary | ICD-10-CM

## 2023-04-25 RX ORDER — CARVEDILOL 3.12 MG/1
TABLET ORAL
Qty: 180 TABLET | Refills: 0 | Status: SHIPPED | OUTPATIENT
Start: 2023-04-25

## 2023-04-25 RX ORDER — HYDROCORTISONE ACETATE 25 MG/1
25 SUPPOSITORY RECTAL 2 TIMES DAILY PRN
Qty: 14 SUPPOSITORY | Refills: 0 | Status: SHIPPED | OUTPATIENT
Start: 2023-04-25

## 2023-04-25 NOTE — TELEPHONE ENCOUNTER
Need to schedule colonoscopy based on the labs from 04/12/2023.    Colonoscopy scheduled at [facility] 04/28/2023 at 3:30pm - arrive 2:30pm.  Patient will come back to  instructions.  Gave him 3 dose of Miralax samples.

## 2023-04-27 ENCOUNTER — ANESTHESIA EVENT (OUTPATIENT)
Dept: PERIOP | Facility: HOSPITAL | Age: 68
End: 2023-04-27
Payer: MEDICARE

## 2023-04-28 ENCOUNTER — LAB (OUTPATIENT)
Dept: LAB | Facility: HOSPITAL | Age: 68
End: 2023-04-28
Payer: MEDICARE

## 2023-04-28 ENCOUNTER — HOSPITAL ENCOUNTER (OUTPATIENT)
Facility: HOSPITAL | Age: 68
Setting detail: HOSPITAL OUTPATIENT SURGERY
Discharge: HOME | End: 2023-04-28
Attending: INTERNAL MEDICINE | Admitting: INTERNAL MEDICINE
Payer: MEDICARE

## 2023-04-28 ENCOUNTER — ANESTHESIA (OUTPATIENT)
Dept: PERIOP | Facility: HOSPITAL | Age: 68
End: 2023-04-28
Payer: MEDICARE

## 2023-04-28 VITALS
WEIGHT: 171.6 LBS | HEART RATE: 67 BPM | DIASTOLIC BLOOD PRESSURE: 69 MMHG | TEMPERATURE: 98.6 F | BODY MASS INDEX: 25.42 KG/M2 | SYSTOLIC BLOOD PRESSURE: 100 MMHG | HEIGHT: 69 IN | RESPIRATION RATE: 16 BRPM | OXYGEN SATURATION: 98 %

## 2023-04-28 DIAGNOSIS — Z11.59 ENCOUNTER FOR SCREENING FOR OTHER VIRAL DISEASES: ICD-10-CM

## 2023-04-28 DIAGNOSIS — K51.311 ULCERATIVE RECTOSIGMOIDITIS WITH RECTAL BLEEDING: ICD-10-CM

## 2023-04-28 DIAGNOSIS — K51.311 ULCERATIVE RECTOSIGMOIDITIS WITH RECTAL BLEEDING: Primary | ICD-10-CM

## 2023-04-28 DIAGNOSIS — K51.911 ULCERATIVE COLITIS WITH RECTAL BLEEDING, UNSPECIFIED LOCATION: ICD-10-CM

## 2023-04-28 PROCEDURE — 36415 COLL VENOUS BLD VENIPUNCTURE: CPT

## 2023-04-28 PROCEDURE — 86704 HEP B CORE ANTIBODY TOTAL: CPT

## 2023-04-28 PROCEDURE — 25010000002 PROPOFOL 200 MG/20ML EMULSION: Performed by: NURSE ANESTHETIST, CERTIFIED REGISTERED

## 2023-04-28 PROCEDURE — 45380 COLONOSCOPY AND BIOPSY: CPT | Performed by: INTERNAL MEDICINE

## 2023-04-28 PROCEDURE — 86705 HEP B CORE ANTIBODY IGM: CPT

## 2023-04-28 PROCEDURE — 86480 TB TEST CELL IMMUN MEASURE: CPT

## 2023-04-28 PROCEDURE — 88305 TISSUE EXAM BY PATHOLOGIST: CPT | Performed by: INTERNAL MEDICINE

## 2023-04-28 RX ORDER — DIPHENHYDRAMINE HCL 25 MG
25 CAPSULE ORAL ONCE
OUTPATIENT
Start: 2023-05-08

## 2023-04-28 RX ORDER — SODIUM CHLORIDE 9 MG/ML
250 INJECTION, SOLUTION INTRAVENOUS ONCE
OUTPATIENT
Start: 2023-05-08 | End: 2023-05-08

## 2023-04-28 RX ORDER — SODIUM CHLORIDE 0.9 % (FLUSH) 0.9 %
10 SYRINGE (ML) INJECTION EVERY 12 HOURS SCHEDULED
Status: DISCONTINUED | OUTPATIENT
Start: 2023-04-28 | End: 2023-04-28 | Stop reason: HOSPADM

## 2023-04-28 RX ORDER — LIDOCAINE HYDROCHLORIDE 10 MG/ML
0.5 INJECTION, SOLUTION EPIDURAL; INFILTRATION; INTRACAUDAL; PERINEURAL ONCE AS NEEDED
Status: DISCONTINUED | OUTPATIENT
Start: 2023-04-28 | End: 2023-04-28 | Stop reason: HOSPADM

## 2023-04-28 RX ORDER — LIDOCAINE HYDROCHLORIDE 20 MG/ML
INJECTION, SOLUTION INFILTRATION; PERINEURAL AS NEEDED
Status: DISCONTINUED | OUTPATIENT
Start: 2023-04-28 | End: 2023-04-28 | Stop reason: SURG

## 2023-04-28 RX ORDER — SODIUM CHLORIDE 9 MG/ML
250 INJECTION, SOLUTION INTRAVENOUS ONCE
OUTPATIENT
Start: 2023-05-08

## 2023-04-28 RX ORDER — ACETAMINOPHEN 325 MG/1
650 TABLET ORAL ONCE
OUTPATIENT
Start: 2023-05-08 | End: 2023-05-08

## 2023-04-28 RX ORDER — SODIUM CHLORIDE, SODIUM LACTATE, POTASSIUM CHLORIDE, CALCIUM CHLORIDE 600; 310; 30; 20 MG/100ML; MG/100ML; MG/100ML; MG/100ML
9 INJECTION, SOLUTION INTRAVENOUS CONTINUOUS
Status: DISCONTINUED | OUTPATIENT
Start: 2023-04-28 | End: 2023-04-28 | Stop reason: HOSPADM

## 2023-04-28 RX ORDER — SODIUM CHLORIDE 0.9 % (FLUSH) 0.9 %
10 SYRINGE (ML) INJECTION AS NEEDED
Status: DISCONTINUED | OUTPATIENT
Start: 2023-04-28 | End: 2023-04-28 | Stop reason: HOSPADM

## 2023-04-28 RX ORDER — ONDANSETRON 2 MG/ML
4 INJECTION INTRAMUSCULAR; INTRAVENOUS ONCE AS NEEDED
Status: DISCONTINUED | OUTPATIENT
Start: 2023-04-28 | End: 2023-04-28 | Stop reason: HOSPADM

## 2023-04-28 RX ORDER — SODIUM CHLORIDE, SODIUM LACTATE, POTASSIUM CHLORIDE, CALCIUM CHLORIDE 600; 310; 30; 20 MG/100ML; MG/100ML; MG/100ML; MG/100ML
100 INJECTION, SOLUTION INTRAVENOUS CONTINUOUS
Status: DISCONTINUED | OUTPATIENT
Start: 2023-04-28 | End: 2023-04-28 | Stop reason: HOSPADM

## 2023-04-28 RX ORDER — ACETAMINOPHEN 325 MG/1
650 TABLET ORAL ONCE
OUTPATIENT
Start: 2023-05-08

## 2023-04-28 RX ORDER — PROPOFOL 10 MG/ML
INJECTION, EMULSION INTRAVENOUS AS NEEDED
Status: DISCONTINUED | OUTPATIENT
Start: 2023-04-28 | End: 2023-04-28 | Stop reason: SURG

## 2023-04-28 RX ORDER — SODIUM CHLORIDE 9 MG/ML
40 INJECTION, SOLUTION INTRAVENOUS AS NEEDED
Status: DISCONTINUED | OUTPATIENT
Start: 2023-04-28 | End: 2023-04-28 | Stop reason: HOSPADM

## 2023-04-28 RX ORDER — BUDESONIDE 9 MG/1
9 TABLET, FILM COATED, EXTENDED RELEASE ORAL DAILY
Qty: 30 TABLET | Refills: 6 | Status: SHIPPED | OUTPATIENT
Start: 2023-04-28

## 2023-04-28 RX ADMIN — SODIUM CHLORIDE, POTASSIUM CHLORIDE, SODIUM LACTATE AND CALCIUM CHLORIDE 9 ML/HR: 600; 310; 30; 20 INJECTION, SOLUTION INTRAVENOUS at 13:22

## 2023-04-28 RX ADMIN — PROPOFOL INJECTABLE EMULSION 50 MG: 10 INJECTION, EMULSION INTRAVENOUS at 15:31

## 2023-04-28 RX ADMIN — PROPOFOL INJECTABLE EMULSION 50 MG: 10 INJECTION, EMULSION INTRAVENOUS at 15:12

## 2023-04-28 RX ADMIN — PROPOFOL INJECTABLE EMULSION 50 MG: 10 INJECTION, EMULSION INTRAVENOUS at 15:22

## 2023-04-28 RX ADMIN — PROPOFOL INJECTABLE EMULSION 50 MG: 10 INJECTION, EMULSION INTRAVENOUS at 15:38

## 2023-04-28 RX ADMIN — LIDOCAINE HYDROCHLORIDE 100 MG: 20 INJECTION, SOLUTION INFILTRATION; PERINEURAL at 15:12

## 2023-04-28 RX ADMIN — PROPOFOL INJECTABLE EMULSION 50 MG: 10 INJECTION, EMULSION INTRAVENOUS at 15:15

## 2023-04-28 RX ADMIN — PROPOFOL INJECTABLE EMULSION 50 MG: 10 INJECTION, EMULSION INTRAVENOUS at 15:25

## 2023-04-28 RX ADMIN — PROPOFOL INJECTABLE EMULSION 50 MG: 10 INJECTION, EMULSION INTRAVENOUS at 15:28

## 2023-04-28 RX ADMIN — PROPOFOL INJECTABLE EMULSION 50 MG: 10 INJECTION, EMULSION INTRAVENOUS at 15:35

## 2023-04-28 RX ADMIN — PROPOFOL INJECTABLE EMULSION 50 MG: 10 INJECTION, EMULSION INTRAVENOUS at 15:18

## 2023-04-28 NOTE — ANESTHESIA POSTPROCEDURE EVALUATION
Patient: Teofilo Jade    Procedure Summary     Date: 04/28/23 Room / Location: [facility] ENDOSCOPY 1 /  [facility]    Anesthesia Start: 1509 Anesthesia Stop: 1543    Procedure: COLONOSCOPY WITH BIOPSY Diagnosis:       Ulcerative colitis with rectal bleeding, unspecified location      Diverticulosis      (Ulcerative colitis with rectal bleeding, unspecified location [K51.911])    Surgeons: Vini Lorenzana MD Provider: Jennifer Kendall CRNA    Anesthesia Type: MAC ASA Status: 3          Anesthesia Type: MAC    Vitals  Vitals Value Taken Time   BP     Temp 98.6 °F (37 °C) 04/28/23 1548   Pulse     Resp     SpO2             Post Anesthesia Care and Evaluation    Patient location during evaluation: PHASE II  Patient participation: complete - patient participated  Level of consciousness: awake  Pain score: 0  Pain management: adequate    Airway patency: patent  Anesthetic complications: No anesthetic complications  PONV Status: none  Cardiovascular status: acceptable  Respiratory status: acceptable  Hydration status: acceptable

## 2023-04-28 NOTE — INTERVAL H&P NOTE
"Vital Signs  /85 (BP Location: Left arm, Patient Position: Lying)   Pulse 72   Temp 98.1 °F (36.7 °C) (Oral)   Resp 16   Ht 174 cm (68.5\")   Wt 77.8 kg (171 lb 9.6 oz)   SpO2 98%   BMI 25.71 kg/m²     H&P reviewed. The patient was examined and there are no changes to the H&P.        "

## 2023-04-28 NOTE — ANESTHESIA PREPROCEDURE EVALUATION
Anesthesia Evaluation     Patient summary reviewed and Nursing notes reviewed   no history of anesthetic complications:  NPO Solid Status: > 8 hours  NPO Liquid Status: > 4 hours           Airway   Mallampati: II  TM distance: >3 FB  Neck ROM: full  No difficulty expected  Dental - normal exam     Pulmonary - normal exam   (+) a smoker (quit 15 years ago) Former, sleep apnea on CPAP,   Cardiovascular - normal exam    ECG reviewed  Patient on routine beta blocker and Beta blocker not taken-may be given intraoperatively  Rhythm: regular  Rate: normal    (+) hypertension well controlled 2 medications or greater, CHF Diastolic >=55%,     ROS comment: Date/Time: 6/30/2022 10:38 AM   Performed by: Sunil Wolf III, MD   Authorized by: Sunil Wolf III, MD   Comparison: compared with previous ECG   Similar to previous ECG   Rhythm: sinus rhythm   Rate: normal   Conduction: left bundle branch block   QRS axis: left   Other: no other findings     Neuro/Psych- negative ROS  (-) psychiatric history  GI/Hepatic/Renal/Endo    (+)  GERD well controlled, PUD, GI bleeding (50 years ago) upper resolved,     Musculoskeletal (-) negative ROS    Abdominal  - normal exam    Abdomen: soft.   Substance History   (+) alcohol use (occasional),      OB/GYN          Other        ROS/Med Hx Other: 2019: Echo    ? Left ventricular systolic function is normal.  ? The following left ventricular wall segments are hypokinetic: apical septal, basal inferoseptal, mid inferoseptal and basal inferoseptal.  ? Left ventricular wall thickness is consistent with borderline concentric hypertrophy.  ? Left ventricular diastolic dysfunction (grade I) consistent with impaired relaxation.  ? Calculated EF = 55.0%.                  Anesthesia Plan    ASA 3     MAC     intravenous induction     Anesthetic plan, risks, benefits, and alternatives have been provided, discussed and informed consent has been obtained with: patient.  Pre-procedure education  provided  Use of blood products discussed with patient  Consented to blood products.   Plan discussed with CRNA.        CODE STATUS:

## 2023-04-28 NOTE — BRIEF OP NOTE
COLONOSCOPY WITH BIOPSY  Progress Note    Teofilo Jade  4/28/2023    Pre-op Diagnosis:   Ulcerative colitis with rectal bleeding, unspecified location [K51.911]       Post-Op Diagnosis Codes:     * Ulcerative colitis with rectal bleeding, unspecified location [K51.911]     * Diverticulosis [K57.90]    Procedure/CPT® Codes:        Procedure(s):  COLONOSCOPY WITH BIOPSY        Surgeon(s):  Vini Lorenzana MD    Anesthesia: Monitored Anesthesia Care    Staff:   Circulator: Kristyn Chun RN  Scrub Person: Sol Rajput         Estimated Blood Loss: none    Urine Voided: * No values recorded between 4/28/2023  3:10 PM and 4/28/2023  3:40 PM *    Specimens:                Specimens     ID Source Type Tests Collected By Collected At Frozen?    A Large Intestine Tissue · TISSUE PATHOLOGY EXAM   Vini Lorenzana MD 4/28/23 1528     Description: Right colon biopsies    This specimen was not marked as sent.    B Large Intestine, Transverse Colon Tissue · TISSUE PATHOLOGY EXAM   Vini Lorenzana MD 4/28/23 1532     Description: Transverse biopsies    This specimen was not marked as sent.    C Large Intestine, Sigmoid Colon Tissue · TISSUE PATHOLOGY EXAM   Vini Lorenzana MD 4/28/23 1535     Description: Sigmoid biopsies    This specimen was not marked as sent.    D Large Intestine, Rectum Tissue · TISSUE PATHOLOGY EXAM   Vini Lorenzana MD 4/28/23 1540     Description: Rectal biopsies    This specimen was not marked as sent.                Drains: * No LDAs found *    Findings: Colon to TI good Prep  Sigmoid Diverticulosis  Colitis- Distal Rectum and Segmental Sigmoid  Micro-Colitis Biopsy (R,T,Sig,Rectal)        Complications: none          Vini Lorenzana MD     Date: 4/28/2023  Time: 15:42 EDT

## 2023-04-29 LAB
HBV CORE AB SERPL QL IA: NEGATIVE
HBV CORE IGM SERPL QL IA: NEGATIVE

## 2023-04-30 LAB
GAMMA INTERFERON BACKGROUND BLD IA-ACNC: 0 IU/ML
M TB IFN-G BLD-IMP: NEGATIVE
M TB IFN-G CD4+ T-CELLS BLD-ACNC: 0.02 IU/ML
M TBIFN-G CD4+ CD8+T-CELLS BLD-ACNC: 0 IU/ML
MITOGEN IGNF BLD-ACNC: 9.52 IU/ML
QUANTIFERON INCUBATION: NORMAL
SERVICE CMNT-IMP: NORMAL

## 2023-05-01 ENCOUNTER — TELEPHONE (OUTPATIENT)
Dept: GASTROENTEROLOGY | Facility: CLINIC | Age: 68
End: 2023-05-01
Payer: MEDICARE

## 2023-05-01 LAB
LAB AP CASE REPORT: NORMAL
PATH REPORT.FINAL DX SPEC: NORMAL
PATH REPORT.GROSS SPEC: NORMAL

## 2023-05-01 RX ORDER — LOSARTAN POTASSIUM 50 MG/1
25 TABLET ORAL DAILY
Qty: 45 TABLET | Refills: 2 | Status: SHIPPED | OUTPATIENT
Start: 2023-05-01

## 2023-05-01 NOTE — TELEPHONE ENCOUNTER
Rx Refill Note  Requested Prescriptions     Pending Prescriptions Disp Refills   • losartan (COZAAR) 50 MG tablet 45 tablet 2     Sig: Take 0.5 tablets by mouth Daily.      Last office visit with prescribing clinician: 6/30/2022   Last telemedicine visit with prescribing clinician: 7/6/2023   Next office visit with prescribing clinician: 7/6/2023                         Would you like a call back once the refill request has been completed: [] Yes [] No    If the office needs to give you a call back, can they leave a voicemail: [] Yes [] No    Sariah House MA  05/01/23, 09:57 EDT

## 2023-05-01 NOTE — TELEPHONE ENCOUNTER
PT called informed of Budesonide approval, Lab Results and plan of care for Remicade.    Pt will need further education on Remicade prior to starting    Awaiting on approval and Pathology

## 2023-05-01 NOTE — TELEPHONE ENCOUNTER
Plan of care:    Remicade Start at [facility] ( sent notification to auth department for PA initiation), TB and HBV completed, Path pending.    PA sent for Budesonide through [facility]     Pt follow up scheduled for 8/2/23       PMH: Dx 10/02/17 with C/S   Apriso  Dewayne Wynn 1.2gm  Canasa supp

## 2023-05-03 NOTE — TELEPHONE ENCOUNTER
Waiting on response from PA department.      Spoke with provider regarding pt traveling to [place] for 2-5 months during winter. Provider advises for pt to travel back home for infusions or pt will need to locate infusion center in [place].

## 2023-05-05 NOTE — TELEPHONE ENCOUNTER
Called pt   He stated reviewed information on line regarding remicade, at this time he doesn't want to presue infusions due to risks. Did talk patient in to review information mailed out yesterday and be willing to discuss concerns with provider. Agreed to review information.    no

## 2023-05-10 ENCOUNTER — TELEPHONE (OUTPATIENT)
Dept: GASTROENTEROLOGY | Facility: CLINIC | Age: 68
End: 2023-05-10
Payer: MEDICARE

## 2023-05-10 NOTE — TELEPHONE ENCOUNTER
Awaiting response from PA department on approval  PT hesitant with infusion will have Provider to discuss concerns.

## 2023-05-12 NOTE — TELEPHONE ENCOUNTER
5/12/2023 reviewed patient concerns individually including risks of adverse events. Feeling better after scope and being on budesonide, reviewed that budesonide is not a long term solution.     Wants to go back on lialda which he feels previously controlled him. Insurance requiring failure of balsalazide, sulfaza before will cover. Reviewed with patient they have similar efficacy and rationale for escalating care including prevention of AE such as CRC. The 30 yr rate in UC being as high as 18%.    Patient concerned with infusions when frequently in [place]. Would be more open to something he could self administer.     Discussed extensively weighing known risks of uncontrolled UC vs benefits and risks of meds.

## 2023-05-15 RX ORDER — ADALIMUMAB 80MG/0.8ML
160 KIT SUBCUTANEOUS ONCE
Qty: 3 EACH | Refills: 0 | Status: SHIPPED | OUTPATIENT
Start: 2023-05-15 | End: 2023-05-15

## 2023-06-01 ENCOUNTER — TELEPHONE (OUTPATIENT)
Dept: GASTROENTEROLOGY | Facility: CLINIC | Age: 68
End: 2023-06-01

## 2023-06-01 RX ORDER — ADALIMUMAB 40MG/0.4ML
40 KIT SUBCUTANEOUS
Qty: 2 EACH | Refills: 11 | Status: SHIPPED | OUTPATIENT
Start: 2023-06-01

## 2023-06-01 NOTE — TELEPHONE ENCOUNTER
FIRST DOSE TODAY EDUCATION GIVEN    WILL NEED NEW ORDER FOR 40 MG MAINTENANCE    Consent (Scalp)/Introductory Paragraph: The rationale for Mohs was explained to the patient and consent was obtained. The risks, benefits and alternatives to therapy were discussed in detail. Specifically, the risks of changes in hair growth pattern secondary to repair, infection, scarring, bleeding, prolonged wound healing, incomplete removal, allergy to anesthesia, nerve injury and recurrence were addressed. Prior to the procedure, the treatment site was clearly identified and confirmed by the patient. All components of Universal Protocol/PAUSE Rule completed.

## 2023-07-24 DIAGNOSIS — J34.89 SINUS DRAINAGE: ICD-10-CM

## 2023-07-24 RX ORDER — SPIRONOLACTONE 25 MG/1
25 TABLET ORAL DAILY
Qty: 90 TABLET | Refills: 2 | Status: SHIPPED | OUTPATIENT
Start: 2023-07-24

## 2023-07-24 RX ORDER — CETIRIZINE HYDROCHLORIDE 10 MG/1
TABLET ORAL
Qty: 90 TABLET | Refills: 0 | Status: SHIPPED | OUTPATIENT
Start: 2023-07-24

## 2023-08-02 ENCOUNTER — OFFICE VISIT (OUTPATIENT)
Dept: GASTROENTEROLOGY | Facility: CLINIC | Age: 68
End: 2023-08-02
Payer: MEDICARE

## 2023-08-02 VITALS
BODY MASS INDEX: 27.1 KG/M2 | WEIGHT: 178.8 LBS | HEIGHT: 68 IN | SYSTOLIC BLOOD PRESSURE: 118 MMHG | DIASTOLIC BLOOD PRESSURE: 72 MMHG

## 2023-08-02 DIAGNOSIS — M85.80 OSTEOPENIA, UNSPECIFIED LOCATION: ICD-10-CM

## 2023-08-02 DIAGNOSIS — K51.00 ULCERATIVE PANCOLITIS WITHOUT COMPLICATION: Primary | ICD-10-CM

## 2023-08-02 PROCEDURE — 3078F DIAST BP <80 MM HG: CPT | Performed by: INTERNAL MEDICINE

## 2023-08-02 PROCEDURE — 1159F MED LIST DOCD IN RCRD: CPT | Performed by: INTERNAL MEDICINE

## 2023-08-02 PROCEDURE — 99213 OFFICE O/P EST LOW 20 MIN: CPT | Performed by: INTERNAL MEDICINE

## 2023-08-02 PROCEDURE — 1160F RVW MEDS BY RX/DR IN RCRD: CPT | Performed by: INTERNAL MEDICINE

## 2023-08-02 PROCEDURE — 3074F SYST BP LT 130 MM HG: CPT | Performed by: INTERNAL MEDICINE

## 2023-08-09 ENCOUNTER — HOSPITAL ENCOUNTER (OUTPATIENT)
Dept: CARDIOLOGY | Facility: HOSPITAL | Age: 68
Discharge: HOME | End: 2023-08-09
Payer: MEDICARE

## 2023-08-09 VITALS
SYSTOLIC BLOOD PRESSURE: 160 MMHG | BODY MASS INDEX: 27.06 KG/M2 | HEIGHT: 68 IN | HEART RATE: 74 BPM | DIASTOLIC BLOOD PRESSURE: 89 MMHG | WEIGHT: 178.57 LBS

## 2023-08-09 DIAGNOSIS — I10 PRIMARY HYPERTENSION: Chronic | ICD-10-CM

## 2023-08-09 DIAGNOSIS — Z86.79 HISTORY OF CARDIOMYOPATHY: Chronic | ICD-10-CM

## 2023-08-09 DIAGNOSIS — I44.7 LBBB (LEFT BUNDLE BRANCH BLOCK): Chronic | ICD-10-CM

## 2023-08-09 DIAGNOSIS — R06.02 SOB (SHORTNESS OF BREATH): ICD-10-CM

## 2023-08-09 PROCEDURE — 25510000001 PERFLUTREN (DEFINITY) 8.476 MG IN SODIUM CHLORIDE (PF) 0.9 % 10 ML INJECTION: Performed by: INTERNAL MEDICINE

## 2023-08-09 PROCEDURE — 93306 TTE W/DOPPLER COMPLETE: CPT | Performed by: INTERNAL MEDICINE

## 2023-08-09 RX ADMIN — SODIUM CHLORIDE 2 ML: 9 INJECTION INTRAMUSCULAR; INTRAVENOUS; SUBCUTANEOUS at 10:17

## 2023-08-09 NOTE — PROGRESS NOTES
Reviewed results and recommendations with Teofilo Jade.  Patient verbalized understanding of results and recommendations.    Thank you,  Leela REA RN  Triage Nurse [facility]   13:00 EDT

## 2023-08-14 RX ORDER — CARVEDILOL 3.12 MG/1
TABLET ORAL
Qty: 180 TABLET | Refills: 1 | Status: SHIPPED | OUTPATIENT
Start: 2023-08-14

## 2023-08-21 LAB
BH CV ECHO MEAS - AO MAX PG: 5.2 MMHG
BH CV ECHO MEAS - AO MEAN PG: 3 MMHG
BH CV ECHO MEAS - AO V2 MAX: 114 CM/SEC
BH CV ECHO MEAS - AO V2 VTI: 22.2 CM
BH CV ECHO MEAS - AVA(I,D): 2.23 CM2
BH CV ECHO MEAS - EDV(CUBED): 97.3 ML
BH CV ECHO MEAS - EDV(MOD-SP2): 128 ML
BH CV ECHO MEAS - EDV(MOD-SP4): 145 ML
BH CV ECHO MEAS - EF(MOD-BP): 52.1 %
BH CV ECHO MEAS - EF(MOD-SP2): 47.7 %
BH CV ECHO MEAS - EF(MOD-SP4): 55.9 %
BH CV ECHO MEAS - ESV(CUBED): 36.1 ML
BH CV ECHO MEAS - ESV(MOD-SP2): 67 ML
BH CV ECHO MEAS - ESV(MOD-SP4): 64 ML
BH CV ECHO MEAS - FS: 28.2 %
BH CV ECHO MEAS - IVS/LVPW: 1 CM
BH CV ECHO MEAS - IVSD: 1 CM
BH CV ECHO MEAS - LV DIASTOLIC VOL/BSA (35-75): 74.7 CM2
BH CV ECHO MEAS - LV MASS(C)D: 158.8 GRAMS
BH CV ECHO MEAS - LV MAX PG: 4.4 MMHG
BH CV ECHO MEAS - LV MEAN PG: 2 MMHG
BH CV ECHO MEAS - LV SYSTOLIC VOL/BSA (12-30): 33 CM2
BH CV ECHO MEAS - LV V1 MAX: 105 CM/SEC
BH CV ECHO MEAS - LV V1 VTI: 16 CM
BH CV ECHO MEAS - LVIDD: 4.6 CM
BH CV ECHO MEAS - LVIDS: 3.3 CM
BH CV ECHO MEAS - LVOT AREA: 3.1 CM2
BH CV ECHO MEAS - LVOT DIAM: 1.98 CM
BH CV ECHO MEAS - LVPWD: 1 CM
BH CV ECHO MEAS - MV A MAX VEL: 59.7 CM/SEC
BH CV ECHO MEAS - MV DEC SLOPE: 168.4 CM/SEC2
BH CV ECHO MEAS - MV DEC TIME: 0.19 MSEC
BH CV ECHO MEAS - MV E MAX VEL: 34.1 CM/SEC
BH CV ECHO MEAS - MV E/A: 0.57
BH CV ECHO MEAS - MV MAX PG: 2.1 MMHG
BH CV ECHO MEAS - MV MEAN PG: 0.73 MMHG
BH CV ECHO MEAS - MV P1/2T: 82.6 MSEC
BH CV ECHO MEAS - MV V2 VTI: 14.4 CM
BH CV ECHO MEAS - MVA(P1/2T): 2.7 CM2
BH CV ECHO MEAS - MVA(VTI): 3.4 CM2
BH CV ECHO MEAS - PA ACC TIME: 0.11 SEC
BH CV ECHO MEAS - PA V2 MAX: 125 CM/SEC
BH CV ECHO MEAS - QP/QS: 1.85
BH CV ECHO MEAS - RV MAX PG: 2.8 MMHG
BH CV ECHO MEAS - RV V1 MAX: 83.8 CM/SEC
BH CV ECHO MEAS - RV V1 VTI: 15 CM
BH CV ECHO MEAS - RVOT DIAM: 2.8 CM
BH CV ECHO MEAS - SI(MOD-SP2): 31.4 ML/M2
BH CV ECHO MEAS - SI(MOD-SP4): 41.7 ML/M2
BH CV ECHO MEAS - SV(LVOT): 49.5 ML
BH CV ECHO MEAS - SV(MOD-SP2): 61 ML
BH CV ECHO MEAS - SV(MOD-SP4): 81 ML
BH CV ECHO MEAS - SV(RVOT): 91.7 ML
BH CV XLRA - RV BASE: 3.4 CM
BH CV XLRA - RV LENGTH: 9.1 CM
BH CV XLRA - RV MID: 2.38 CM
BH CV XLRA - TDI S': 10.2 CM/SEC
LEFT ATRIUM VOLUME INDEX: 14.3 ML/M2
SINUS: 3.3 CM

## 2023-09-04 ENCOUNTER — HOSPITAL ENCOUNTER (EMERGENCY)
Facility: HOSPITAL | Age: 68
Discharge: HOME | End: 2023-09-04
Attending: STUDENT IN AN ORGANIZED HEALTH CARE EDUCATION/TRAINING PROGRAM
Payer: MEDICARE

## 2023-09-04 VITALS
DIASTOLIC BLOOD PRESSURE: 87 MMHG | TEMPERATURE: 98 F | HEIGHT: 69 IN | WEIGHT: 175 LBS | HEART RATE: 65 BPM | OXYGEN SATURATION: 95 % | RESPIRATION RATE: 18 BRPM | SYSTOLIC BLOOD PRESSURE: 144 MMHG | BODY MASS INDEX: 25.92 KG/M2

## 2023-09-04 DIAGNOSIS — N50.1 SCROTAL BLEEDING: Primary | ICD-10-CM

## 2023-09-04 PROCEDURE — 99283 EMERGENCY DEPT VISIT LOW MDM: CPT

## 2023-09-04 RX ADMIN — SILVER NITRATE APPLICATORS 1 APPLICATION: 25; 75 STICK TOPICAL at 10:04

## 2023-09-04 NOTE — ED PROVIDER NOTES
Subjective   History of Present Illness  Pt is a 67 y.o. male with PMH as listed who presents for   Chief Complaint   Patient presents with    Testicle Pain     Patient states approximately 0900 left testicle started to bleeding. Patient denies pain, however was unable to get bleeding stopped.       Patient presents for Suire bleeding to left side scrotum.  States that at 9 this morning he was in the kitchen when he noticed something around on his leg and noticed that there was a moderate mount of blood.  Did not have any associated chest pain, [place], shortness of breath.  Has not had any other new complaints.  He has not on blood thinners.  Denies any testicular pain, fever.  No other new complaints.    Review of Systems    Past Medical History:   Diagnosis Date    Anxiety     Colon polyp     GERD (gastroesophageal reflux disease)     History of cardiomyopathy 03/28/2018    Hypertension     Nonischemic cardiomyopathy     SEBASTIAN (obstructive sleep apnea)     Ulcer of abdomen wall        Allergies   Allergen Reactions    Warfarin Rash       Past Surgical History:   Procedure Laterality Date    COLONOSCOPY      COLONOSCOPY N/A 10/2/2017    Procedure: COLONOSCOPY, biopsy; polypectomy;  Surgeon: Sushila Amezquita MD;  Location: [facility];  Service:     COLONOSCOPY N/A 4/28/2023    Procedure: COLONOSCOPY WITH BIOPSY;  Surgeon: Vini Lorenzana MD;  Location: [facility] OR;  Service: Gastroenterology;  Laterality: N/A;  Active Ulcerative colitis; Biopsies- right colon, transverse, sigmoid and rectal;    ENDOSCOPY N/A 7/10/2020    Procedure: ESOPHAGOGASTRODUODENOSCOPY, biopsy;  Surgeon: Vini Lorenzana MD;  Location: [facility] OR;  Service: Gastroenterology;  Laterality: N/A;  Ulcerative esophagitis; Biopsies- gastric and distal esophagus; gastritis    KNEE SURGERY  2002 2003    both knee       Family History   Problem Relation Age of Onset    Heart disease Mother     Diabetes Mother     Stroke Mother      Hypertension Mother     Colon polyps Mother     Heart disease Father     Diabetes Father        Social History     Socioeconomic History    Marital status:    Tobacco Use    Smoking status: Former    Smokeless tobacco: Never    Tobacco comments:     caffine 3 cups daily - quit 5 yrs ago   Vaping Use    Vaping Use: Never used   Substance and Sexual Activity    Alcohol use: Not Currently     Comment: occ     Drug use: No    Sexual activity: Defer           Objective   Physical Exam  Constitutional:       Appearance: Normal appearance.   HENT:      Head: Normocephalic and atraumatic.      Mouth/Throat:      Mouth: Mucous membranes are moist.      Pharynx: Oropharynx is clear.   Eyes:      Conjunctiva/sclera: Conjunctivae normal.   Pulmonary:      Effort: Pulmonary effort is normal.   Abdominal:      General: Abdomen is flat.   Genitourinary:     Penis: Normal.       Testes: Normal.      Comments: Small punctate area that appears to be a picked scab to left side scrotum which is bleeding.  Hemostatic when pressure is applied.  There is no underlying hematoma, scrotal erythema, warmth, purulence.  There is no signs of infection at this time.  Musculoskeletal:      Cervical back: Neck supple.   Skin:     General: Skin is warm and dry.   Neurological:      Mental Status: He is alert.   Psychiatric:         Mood and Affect: Mood normal.       Procedures           ED Course                                           Medical Decision Making  Scrotal hematoma, epididymitis, scrotal bleeding    Problems Addressed:  Scrotal bleeding: complicated acute illness or injury     Details: Area without any signs of infection at this time.  Silver nitrate stick was applied, hemostatic.  Patient observed for 30 minutes without any recurrence of bleeding.  Discussed patient plan for discharge with PCP follow-up and return if he develops any recurrent bleeding or any chest pain, shortness of breath, or any other worrisome complaints.   Patient understands and agrees with plan of care.  All questions answered.    Risk  Prescription drug management.        Final diagnoses:   Scrotal bleeding       ED Disposition  ED Disposition       ED Disposition   Discharge    Condition   Stable    Comment   --               Maninder Vazquez MD  [address] [place] [address]  [phone]    Schedule an appointment as soon as possible for a visit in 2 days  For re-evaluation         Medication List      No changes were made to your prescriptions during this visit.            Richie Encinas MD  09/04/23 [id number]

## 2023-09-18 RX ORDER — OMEPRAZOLE 40 MG/1
40 CAPSULE, DELAYED RELEASE ORAL DAILY
Qty: 90 CAPSULE | Refills: 3 | Status: SHIPPED | OUTPATIENT
Start: 2023-09-18

## 2023-10-30 DIAGNOSIS — J34.89 SINUS DRAINAGE: ICD-10-CM

## 2023-10-30 RX ORDER — CETIRIZINE HYDROCHLORIDE 10 MG/1
TABLET ORAL
Qty: 90 TABLET | Refills: 0 | Status: SHIPPED | OUTPATIENT
Start: 2023-10-30

## 2023-11-02 ENCOUNTER — TELEPHONE (OUTPATIENT)
Dept: GASTROENTEROLOGY | Facility: CLINIC | Age: 68
End: 2023-11-02
Payer: MEDICARE

## 2023-11-02 NOTE — TELEPHONE ENCOUNTER
Make sure okay to take flu vaccine  if so when can space with Humira Shot    November 1st last Humira injection not due again until 15

## 2023-11-20 ENCOUNTER — OFFICE VISIT (OUTPATIENT)
Dept: GASTROENTEROLOGY | Facility: CLINIC | Age: 68
End: 2023-11-20
Payer: MEDICARE

## 2023-11-20 ENCOUNTER — TELEPHONE (OUTPATIENT)
Dept: GASTROENTEROLOGY | Facility: CLINIC | Age: 68
End: 2023-11-20

## 2023-11-20 VITALS
BODY MASS INDEX: 27.05 KG/M2 | WEIGHT: 182.6 LBS | HEIGHT: 69 IN | SYSTOLIC BLOOD PRESSURE: 126 MMHG | DIASTOLIC BLOOD PRESSURE: 86 MMHG

## 2023-11-20 DIAGNOSIS — K51.00 ULCERATIVE PANCOLITIS WITHOUT COMPLICATION: Primary | ICD-10-CM

## 2023-11-20 PROCEDURE — 1160F RVW MEDS BY RX/DR IN RCRD: CPT | Performed by: INTERNAL MEDICINE

## 2023-11-20 PROCEDURE — 3074F SYST BP LT 130 MM HG: CPT | Performed by: INTERNAL MEDICINE

## 2023-11-20 PROCEDURE — 1159F MED LIST DOCD IN RCRD: CPT | Performed by: INTERNAL MEDICINE

## 2023-11-20 PROCEDURE — 3079F DIAST BP 80-89 MM HG: CPT | Performed by: INTERNAL MEDICINE

## 2023-11-20 PROCEDURE — 99213 OFFICE O/P EST LOW 20 MIN: CPT | Performed by: INTERNAL MEDICINE

## 2023-11-20 NOTE — TELEPHONE ENCOUNTER
Pt currently with Humira- averages 55 per month out pocket cost.Will run new PA jan 1st pt aware possibility of changing to biosimular.

## 2023-11-20 NOTE — PROGRESS NOTES
PATIENT INFORMATION  Teofilo Jade       - 1955    CHIEF COMPLAINT  Chief Complaint   Patient presents with   • Ulcerative pancolitis       HISTORY OF PRESENT ILLNESS  Has chronic fatigue and since his COVID infection     Sees [facility] so no labs for 6 months but he has had them drawn    Successful wean of Budesonide and is down to 2 mesalaimne tablets daily     OK to wean his mesalamine to one daily for a month and then can D/C      REVIEWED PERTINENT RESULTS/ LABS  Lab Results   Component Value Date    CASEREPORT  2023     Surgical Pathology Report                         Case: [id number]                                  Authorizing Provider:  Vini Lorenzana        Collected:           2023 03:28 PM                                 MD Lilian                                                                   Ordering Location:     [facility]   Received:            2023 04:06 PM                                 OR                                                                           Pathologist:           Teena Aguilar MD                                                    Specimens:   1) - Large Intestine, Right colon biopsies                                                          2) - Large Intestine, Transverse Colon, Transverse biopsies                                         3) - Large Intestine, Sigmoid Colon, Sigmoid biopsies                                               4) - Large Intestine, Rectum, Rectal biopsies                                              FINALDX  2023     1. Ascending Colon, Biopsies: Benign colonic mucosa with   A. No hyperplastic or tubulovillous change.   B. No significant inflammation.   C. No crypt distortion or basement membrane thickening.   D. No viral inclusions or other organisms on routinely stained sections.     2. Transverse Colon, Biopsies: Benign colonic mucosa with   A. No hyperplastic or tubulovillous  change.   B. No significant inflammation.   C. No crypt distortion or basement membrane thickening.   D. No viral inclusions or other organisms on routinely stained sections.     3. Sigmoid Colon, Biopsy:    A. Mild to moderate chronic active colitis with cryptitis, crypt abscesses and focal ulceration, consistent with a                    history of inflammatory bowel disease.   B. No viral inclusions, intestinal parasites nor granulomas identified on routine staining.   C. Reactive glandular changes, negative for dysplasia.    4. Rectum, Biopsy:    A. Mild chronic active colitis with cryptitis and crypt abscess formation consistent with a history of inflammatory                    bowel disease.   B. No viral inclusions, intestinal parasites nor granulomas identified on routine staining.   C. Reactive glandular changes, negative for dysplasia.    swm/jse        Lab Results   Component Value Date    HGB 14.6 04/11/2023    MCV 83.2 04/11/2023     04/11/2023    ALT 8 10/17/2019    AST 11 10/17/2019      No results found.    REVIEW OF SYSTEMS  Review of Systems   Constitutional:  Negative for activity change, chills, fever and unexpected weight change.   HENT:  Negative for congestion.    Eyes:  Negative for visual disturbance.   Respiratory:  Negative for shortness of breath.    Cardiovascular:  Negative for chest pain and palpitations.   Gastrointestinal:  Positive for diarrhea (Loose). Negative for abdominal pain and blood in stool.   Endocrine: Negative for cold intolerance and heat intolerance.   Genitourinary:  Negative for hematuria.   Musculoskeletal:  Negative for gait problem.   Skin:  Negative for color change.   Allergic/Immunologic: Negative for immunocompromised state.   Neurological:  Negative for weakness and light-headedness.   Hematological:  Negative for adenopathy.   Psychiatric/Behavioral:  Negative for sleep disturbance. The patient is not nervous/anxious.        ACTIVE PROBLEMS  Patient  Active Problem List    Diagnosis    • Osteopenia [M85.80]    • Ulcerative colitis with rectal bleeding [K51.911]    • Nasal drainage [J34.89]    • LBBB (left bundle branch block) [I44.7]    • Dysphagia [R13.10]    • Non-intractable vomiting [R11.10]    • History of cardiomyopathy [Z86.79]    • Ulcerative pancolitis without complication [K51.00]    • Rectal bleeding [K62.5]    • Generalized abdominal pain [R10.84]    • Diarrhea [R19.7]    • Hypertension [I10]    • SEBASTIAN (obstructive sleep apnea) [G47.33]          PAST MEDICAL HISTORY  Past Medical History:   Diagnosis Date   • Anxiety    • Colon polyp    • GERD (gastroesophageal reflux disease)    • History of cardiomyopathy 03/28/2018   • Hypertension    • Nonischemic cardiomyopathy    • SEBASTIAN (obstructive sleep apnea)    • Ulcer of abdomen wall          SURGICAL HISTORY  Past Surgical History:   Procedure Laterality Date   • COLONOSCOPY     • COLONOSCOPY N/A 10/2/2017    Procedure: COLONOSCOPY, biopsy; polypectomy;  Surgeon: Sushila Amezquita MD;  Location: [facility];  Service:    • COLONOSCOPY N/A 4/28/2023    Procedure: COLONOSCOPY WITH BIOPSY;  Surgeon: Vini Lorenzana MD;  Location: [facility] OR;  Service: Gastroenterology;  Laterality: N/A;  Active Ulcerative colitis; Biopsies- right colon, transverse, sigmoid and rectal;   • ENDOSCOPY N/A 7/10/2020    Procedure: ESOPHAGOGASTRODUODENOSCOPY, biopsy;  Surgeon: Vini Lorenzana MD;  Location: [facility] OR;  Service: Gastroenterology;  Laterality: N/A;  Ulcerative esophagitis; Biopsies- gastric and distal esophagus; gastritis   • KNEE SURGERY  2002 2003    both knee         FAMILY HISTORY  Family History   Problem Relation Age of Onset   • Heart disease Mother    • Diabetes Mother    • Stroke Mother    • Hypertension Mother    • Colon polyps Mother    • Heart disease Father    • Diabetes Father          SOCIAL HISTORY  Social History     Occupational History   • Not on file   Tobacco Use   • Smoking  "status: Former   • Smokeless tobacco: Never   • Tobacco comments:     caffine 3 cups daily - quit 5 yrs ago   Vaping Use   • Vaping Use: Never used   Substance and Sexual Activity   • Alcohol use: Not Currently     Comment: occ    • Drug use: No   • Sexual activity: Defer         CURRENT MEDICATIONS    Current Outpatient Medications:   •  Adalimumab (Humira Pen) 40 MG/0.4ML Pen-injector Kit, Inject 40 mg under the skin into the appropriate area as directed Every 14 (Fourteen) Days., Disp: 2 each, Rfl: 11  •  carvedilol (COREG) 3.125 MG tablet, TAKE 1 TABLET BY MOUTH 2 TIMES A DAY AS DIRECTED., Disp: 180 tablet, Rfl: 1  •  cetirizine (zyrTEC) 10 MG tablet, TAKE 1 TABLET BY MOUTH EVERY MORNING., Disp: 90 tablet, Rfl: 0  •  LORazepam (ATIVAN) 1 MG tablet, Take 1 tablet by mouth 2 (Two) Times a Day., Disp: , Rfl:   •  losartan (COZAAR) 50 MG tablet, Take 0.5 tablets by mouth Daily., Disp: 45 tablet, Rfl: 2  •  mesalamine (Apriso) 0.375 g 24 hr capsule, Take 4 capsules by mouth Daily. (Patient taking differently: Take 0.75 mg by mouth Daily.), Disp: 120 capsule, Rfl: 11  •  omeprazole (priLOSEC) 40 MG capsule, TAKE 1 CAPSULE BY MOUTH DAILY., Disp: 90 capsule, Rfl: 3  •  rosuvastatin (CRESTOR) 5 MG tablet, Take 1 tablet by mouth Daily., Disp: , Rfl:   •  spironolactone (ALDACTONE) 25 MG tablet, Take 1 tablet by mouth Daily., Disp: 90 tablet, Rfl: 2  •  traZODone (DESYREL) 100 MG tablet, Take 1 tablet by mouth Every Night., Disp: , Rfl:   •  zolpidem CR (AMBIEN CR) 12.5 MG CR tablet, Take  by mouth., Disp: , Rfl:     ALLERGIES  Warfarin    VITALS  Vitals:    11/20/23 1343   BP: 126/86   BP Location: Left arm   Patient Position: Sitting   Cuff Size: Adult   Weight: 82.8 kg (182 lb 9.6 oz)   Height: 174 cm (68.5\")       PHYSICAL EXAM  Debilities/Disabilities Identified: None  Emotional Behavior: Appropriate  Wt Readings from Last 3 Encounters:   11/20/23 82.8 kg (182 lb 9.6 oz)   09/04/23 79.4 kg (175 lb)   08/09/23 81 kg " "(178 lb 9.2 oz)     Ht Readings from Last 1 Encounters:   11/20/23 174 cm (68.5\")     Body mass index is 27.36 kg/m².  Physical Exam  Constitutional:       Appearance: Normal appearance. He is well-developed. He is not diaphoretic.   HENT:      Head: Normocephalic and atraumatic.   Eyes:      General: No scleral icterus.     Conjunctiva/sclera: Conjunctivae normal.      Pupils: Pupils are equal, round, and reactive to light.   Neck:      Thyroid: No thyromegaly.   Cardiovascular:      Rate and Rhythm: Normal rate and regular rhythm.      Heart sounds: Normal heart sounds. No murmur heard.     No gallop.   Pulmonary:      Effort: Pulmonary effort is normal.      Breath sounds: Normal breath sounds. No wheezing or rales.   Abdominal:      General: Bowel sounds are normal. There is no distension or abdominal bruit.      Palpations: Abdomen is soft. There is no shifting dullness, fluid wave or mass.      Tenderness: There is no abdominal tenderness. There is no guarding. Negative signs include Mendoza's sign.      Hernia: There is no hernia in the ventral area.   Musculoskeletal:         General: Normal range of motion.      Cervical back: Normal range of motion and neck supple.   Lymphadenopathy:      Cervical: No cervical adenopathy.   Skin:     General: Skin is warm and dry.      Findings: No erythema or rash.   Neurological:      Mental Status: He is alert and oriented to person, place, and time.   Psychiatric:         Mood and Affect: Mood normal.         Behavior: Behavior normal.     CLINICAL DATA REVIEWED   reviewed previous lab results and integrated with today's visit, reviewed notes from other physicians and/or last GI encounter, reviewed previous endoscopy results and available photos, reviewed surgical pathology results from previous biopsies    ASSESSMENT  There are no diagnoses linked to this encounter.      PLAN  Get labs form [facility] and see if any further Anemia labs need to be checked    No " follow-ups on file.    I have discussed the above plan with the patient.  They verbalize understanding and are in agreement with the plan.  They have been advised to contact the office for any questions, concerns, or changes related to their health.

## 2023-11-20 NOTE — TELEPHONE ENCOUNTER
See in the office today and have form from [employer] that needs to filled out.     [facility] REQUEST OF CONTINUITY OF CARE FORM.    Fax to [phone]

## 2024-01-29 DIAGNOSIS — J34.89 SINUS DRAINAGE: ICD-10-CM

## 2024-01-29 RX ORDER — CETIRIZINE HYDROCHLORIDE 10 MG/1
TABLET ORAL
Qty: 90 TABLET | Refills: 0 | OUTPATIENT
Start: 2024-01-29

## 2024-02-23 ENCOUNTER — TELEPHONE (OUTPATIENT)
Dept: CARDIOLOGY | Facility: CLINIC | Age: 69
End: 2024-02-23
Payer: MEDICARE

## 2024-02-23 RX ORDER — LOSARTAN POTASSIUM 25 MG/1
25 TABLET ORAL DAILY
Qty: 90 TABLET | Refills: 2 | Status: SHIPPED | OUTPATIENT
Start: 2024-02-23

## 2024-02-24 NOTE — TELEPHONE ENCOUNTER
Please call patient. Dr. Wolf is out of office. I have refilled his losartan. He was taking losartan 50 mg - 1/2 tablet daily. I have prescribed losartan 25 mg 1 tablet daily. Please let him know that I changed the mg on the tablet. It should be easier than having to cut the pill in half. Thanks.

## 2024-02-26 NOTE — TELEPHONE ENCOUNTER
I spoke with pt and notified him that when he gets his new losartan prescription, he will take 1 25mg tablet.  Verbalized understanding.    Tiffanie Glasgow, RN  Triage RN  02/26/24 08:42 EST

## 2024-04-08 ENCOUNTER — TELEPHONE (OUTPATIENT)
Dept: GASTROENTEROLOGY | Facility: CLINIC | Age: 69
End: 2024-04-08
Payer: MEDICARE

## 2024-04-08 NOTE — TELEPHONE ENCOUNTER
Called pt with update:   Currently Humira injections Last refill was 3/19/24.  Returned from [place] Saturday   Scheduled Dr. Vazquez on 18th for labs    Will send to MD if any additional labs for PCP to draw besides HBV and TB

## 2024-04-09 DIAGNOSIS — Z11.59 NEED FOR HEPATITIS B SCREENING TEST: ICD-10-CM

## 2024-04-09 DIAGNOSIS — Z11.1 SCREENING-PULMONARY TB: Primary | ICD-10-CM

## 2024-04-11 ENCOUNTER — TELEPHONE (OUTPATIENT)
Dept: GASTROENTEROLOGY | Facility: CLINIC | Age: 69
End: 2024-04-11
Payer: MEDICARE

## 2024-04-11 NOTE — TELEPHONE ENCOUNTER
LVM for Hope to call back    ( If PCP unable to draw the HBV then we can change the orders for HBV and TB to be drawn separately at hospital/ Trying to avoid pt having to have labs drawn separately).

## 2024-04-11 NOTE — TELEPHONE ENCOUNTER
Samina from Dr. Vazquez called about the HBV Core Antibody, IGG/IGM.  She states the IGM is not available.  What would you like to do?    [facility] at [facility] [phone]

## 2024-04-12 PROBLEM — Z11.1 SCREENING-PULMONARY TB: Status: ACTIVE | Noted: 2024-04-12

## 2024-04-12 NOTE — TELEPHONE ENCOUNTER
Per Alma Rosa, she has already notified Hope @ Dr. Vazquez' that we will direct pt to proceed to  [facility] to get these drawn.  Unable to reach pt directly, but LM on his VM to register in the back entrance of  [facility] for this lab work to get drawn anytime between 7:30 am and 5:00 pm during the week without appt.

## 2024-04-15 ENCOUNTER — LAB (OUTPATIENT)
Dept: LAB | Facility: HOSPITAL | Age: 69
End: 2024-04-15
Payer: MEDICARE

## 2024-04-15 DIAGNOSIS — Z11.59 NEED FOR HEPATITIS B SCREENING TEST: ICD-10-CM

## 2024-04-15 DIAGNOSIS — Z11.1 SCREENING-PULMONARY TB: ICD-10-CM

## 2024-04-15 PROCEDURE — 36415 COLL VENOUS BLD VENIPUNCTURE: CPT

## 2024-04-15 PROCEDURE — 86480 TB TEST CELL IMMUN MEASURE: CPT

## 2024-04-15 PROCEDURE — 86705 HEP B CORE ANTIBODY IGM: CPT

## 2024-04-15 PROCEDURE — 86704 HEP B CORE ANTIBODY TOTAL: CPT

## 2024-04-16 LAB
HBV CORE AB SERPL QL IA: NEGATIVE
HBV CORE IGM SERPL QL IA: NEGATIVE

## 2024-04-17 LAB
GAMMA INTERFERON BACKGROUND BLD IA-ACNC: 0.08 IU/ML
M TB IFN-G BLD-IMP: NEGATIVE
M TB IFN-G CD4+ BCKGRND COR BLD-ACNC: 0.09 IU/ML
M TB IFN-G CD4+CD8+ BCKGRND COR BLD-ACNC: 0.08 IU/ML
MITOGEN IGNF BCKGRD COR BLD-ACNC: >10 IU/ML
QUANTIFERON INCUBATION: NORMAL
SERVICE CMNT-IMP: NORMAL

## 2024-06-17 ENCOUNTER — OFFICE VISIT (OUTPATIENT)
Dept: GASTROENTEROLOGY | Facility: CLINIC | Age: 69
End: 2024-06-17
Payer: MEDICARE

## 2024-06-17 VITALS
BODY MASS INDEX: 26.01 KG/M2 | DIASTOLIC BLOOD PRESSURE: 82 MMHG | WEIGHT: 175.6 LBS | SYSTOLIC BLOOD PRESSURE: 126 MMHG | HEIGHT: 69 IN

## 2024-06-17 DIAGNOSIS — N63.0 SWOLLEN BREAST: ICD-10-CM

## 2024-06-17 DIAGNOSIS — K21.00 GASTROESOPHAGEAL REFLUX DISEASE WITH ESOPHAGITIS, UNSPECIFIED WHETHER HEMORRHAGE: ICD-10-CM

## 2024-06-17 DIAGNOSIS — K51.00 ULCERATIVE PANCOLITIS WITHOUT COMPLICATION: Primary | ICD-10-CM

## 2024-06-17 DIAGNOSIS — N62 HYPERTROPHY OF BREAST: ICD-10-CM

## 2024-06-17 PROCEDURE — 99214 OFFICE O/P EST MOD 30 MIN: CPT

## 2024-06-17 PROCEDURE — 1160F RVW MEDS BY RX/DR IN RCRD: CPT

## 2024-06-17 PROCEDURE — 3074F SYST BP LT 130 MM HG: CPT

## 2024-06-17 PROCEDURE — 1159F MED LIST DOCD IN RCRD: CPT

## 2024-06-17 PROCEDURE — 3079F DIAST BP 80-89 MM HG: CPT

## 2024-06-17 RX ORDER — ADALIMUMAB 40MG/0.4ML
40 KIT SUBCUTANEOUS
Qty: 2 EACH | Refills: 11 | Status: SHIPPED | OUTPATIENT
Start: 2024-06-17

## 2024-06-17 NOTE — PROGRESS NOTES
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 Last Hep B/TB. Doing well today, last humira dose today. Bowels are moving once a day, easy, well-controlled, no cramping or bleeding. Colon is better than has been in years.    L breast swollen, tender spot. Does have some lipomas/ knots in certain areas was told they were calcium knots.    GERD: Managed on 40 mg omeprazole daily. No HB or indigestion.    2023 Colon active UC rectum and sigmoid  7/10/2020 last EGD for dysphagia positive for ulcerative esophagitis, HP gastritis, negative for Barretts.   10/2/2017 Colon with UC active in ascending colon.    Ulcerative Colitis  Associated symptoms include congestion. Pertinent negatives include no abdominal pain, nausea or vomiting.       REVIEWED PERTINENT RESULTS/ LABS  Lab Results   Component Value Date    CASEREPORT  2023     Surgical Pathology Report                         Case: [id number]                                  Authorizing Provider:  Vini Lorenzana        Collected:           2023 03:28 PM                                 MD Lilian                                                                   Ordering Location:     [facility]   Received:            2023 04:06 PM                                 OR                                                                           Pathologist:           Teena Aguilar MD                                                    Specimens:   1) - Large Intestine, Right colon biopsies                                                          2) - Large Intestine, Transverse Colon, Transverse biopsies                                         3) - Large Intestine, Sigmoid Colon, Sigmoid biopsies                                                4) - Large Intestine, Rectum, Rectal biopsies                                              FINALDX  04/28/2023     1. Ascending Colon, Biopsies: Benign colonic mucosa with   A. No hyperplastic or tubulovillous change.   B. No significant inflammation.   C. No crypt distortion or basement membrane thickening.   D. No viral inclusions or other organisms on routinely stained sections.     2. Transverse Colon, Biopsies: Benign colonic mucosa with   A. No hyperplastic or tubulovillous change.   B. No significant inflammation.   C. No crypt distortion or basement membrane thickening.   D. No viral inclusions or other organisms on routinely stained sections.     3. Sigmoid Colon, Biopsy:    A. Mild to moderate chronic active colitis with cryptitis, crypt abscesses and focal ulceration, consistent with a                    history of inflammatory bowel disease.   B. No viral inclusions, intestinal parasites nor granulomas identified on routine staining.   C. Reactive glandular changes, negative for dysplasia.    4. Rectum, Biopsy:    A. Mild chronic active colitis with cryptitis and crypt abscess formation consistent with a history of inflammatory                    bowel disease.   B. No viral inclusions, intestinal parasites nor granulomas identified on routine staining.   C. Reactive glandular changes, negative for dysplasia.    [facility]/isise        Lab Results   Component Value Date    HGB 14.6 04/11/2023    MCV 83.2 04/11/2023     04/11/2023    ALT 8 10/17/2019    AST 11 10/17/2019      No results found.    REVIEW OF SYSTEMS  Review of Systems   Constitutional: Negative.    HENT:  Positive for congestion.    Eyes: Negative.    Respiratory:  Positive for choking.    Cardiovascular: Negative.    Gastrointestinal:  Negative for abdominal pain, constipation, diarrhea, nausea and vomiting.        UC   Endocrine: Negative.    Genitourinary: Negative.    Musculoskeletal: Negative.    Skin: Negative.     Allergic/Immunologic: Negative.    Neurological: Negative.    Hematological:  Bruises/bleeds easily.   Psychiatric/Behavioral: Negative.           ACTIVE PROBLEMS  Patient Active Problem List    Diagnosis     Gastroesophageal reflux disease with esophagitis [K21.00]     Screening-pulmonary TB [Z11.1]     Osteopenia [M85.80]     Ulcerative pancolitis without complication [K51.00]     Nasal drainage [J34.89]     LBBB (left bundle branch block) [I44.7]     Dysphagia [R13.10]     Non-intractable vomiting [R11.10]     History of cardiomyopathy [Z86.79]     Ulcerative pancolitis without complication [K51.00]     Rectal bleeding [K62.5]     Generalized abdominal pain [R10.84]     Diarrhea [R19.7]     Hypertension [I10]     SEBASTIAN (obstructive sleep apnea) [G47.33]          PAST MEDICAL HISTORY  Past Medical History:   Diagnosis Date    Anxiety     Colon polyp     GERD (gastroesophageal reflux disease)     History of cardiomyopathy 03/28/2018    Hypertension     Nonischemic cardiomyopathy     SEBASTIAN (obstructive sleep apnea)     Ulcer of abdomen wall          SURGICAL HISTORY  Past Surgical History:   Procedure Laterality Date    COLONOSCOPY      COLONOSCOPY N/A 10/2/2017    Procedure: COLONOSCOPY, biopsy; polypectomy;  Surgeon: Sushila Amezquita MD;  Location: [facility] OR;  Service:     COLONOSCOPY N/A 4/28/2023    Procedure: COLONOSCOPY WITH BIOPSY;  Surgeon: Vini Lorenzana MD;  Location: [facility] OR;  Service: Gastroenterology;  Laterality: N/A;  Active Ulcerative colitis; Biopsies- right colon, transverse, sigmoid and rectal;    ENDOSCOPY N/A 7/10/2020    Procedure: ESOPHAGOGASTRODUODENOSCOPY, biopsy;  Surgeon: Vini Lorenzana MD;  Location: [facility] OR;  Service: Gastroenterology;  Laterality: N/A;  Ulcerative esophagitis; Biopsies- gastric and distal esophagus; gastritis    KNEE SURGERY  2002 2003    both knee         FAMILY HISTORY  Family History   Problem Relation Age of Onset    Heart disease Mother   "   Diabetes Mother     Stroke Mother     Hypertension Mother     Colon polyps Mother     Heart disease Father     Diabetes Father          SOCIAL HISTORY  Social History     Occupational History    Not on file   Tobacco Use    Smoking status: Former    Smokeless tobacco: Never    Tobacco comments:     caffine 3 cups daily - quit 5 yrs ago   Vaping Use    Vaping status: Never Used   Substance and Sexual Activity    Alcohol use: Not Currently     Comment: occ     Drug use: No    Sexual activity: Defer         CURRENT MEDICATIONS    Current Outpatient Medications:     Adalimumab (Humira, 2 Pen,) 40 MG/0.4ML Pen-injector Kit, Inject 40 mg under the skin into the appropriate area as directed Every 14 (Fourteen) Days., Disp: 2 each, Rfl: 11    carvedilol (COREG) 3.125 MG tablet, TAKE 1 TABLET BY MOUTH 2 TIMES A DAY AS DIRECTED., Disp: 180 tablet, Rfl: 1    cetirizine (zyrTEC) 10 MG tablet, TAKE 1 TABLET BY MOUTH EVERY MORNING., Disp: 90 tablet, Rfl: 0    losartan (COZAAR) 25 MG tablet, Take 1 tablet by mouth Daily., Disp: 90 tablet, Rfl: 2    omeprazole (priLOSEC) 40 MG capsule, TAKE 1 CAPSULE BY MOUTH DAILY., Disp: 90 capsule, Rfl: 3    rosuvastatin (CRESTOR) 5 MG tablet, Take 1 tablet by mouth Daily., Disp: , Rfl:     spironolactone (ALDACTONE) 25 MG tablet, Take 1 tablet by mouth Daily., Disp: 90 tablet, Rfl: 2    traZODone (DESYREL) 100 MG tablet, Take 1 tablet by mouth Every Night., Disp: , Rfl:     zolpidem CR (AMBIEN CR) 12.5 MG CR tablet, Take  by mouth., Disp: , Rfl:     ALLERGIES  Warfarin    VITALS  Vitals:    06/17/24 1107   BP: 126/82   BP Location: Left arm   Patient Position: Sitting   Cuff Size: Large Adult   Weight: 79.7 kg (175 lb 9.6 oz)   Height: 174 cm (68.5\")       PHYSICAL EXAM  Debilities/Disabilities Identified: None  Emotional Behavior: Appropriate  Wt Readings from Last 3 Encounters:   06/17/24 79.7 kg (175 lb 9.6 oz)   11/20/23 82.8 kg (182 lb 9.6 oz)   09/04/23 79.4 kg (175 lb)     Ht Readings " "from Last 1 Encounters:   06/17/24 174 cm (68.5\")     Body mass index is 26.31 kg/m².  Physical Exam  Constitutional:       General: He is not in acute distress.     Appearance: Normal appearance. He is not ill-appearing.   HENT:      Head: Normocephalic and atraumatic.      Mouth/Throat:      Mouth: Mucous membranes are moist.      Pharynx: No posterior oropharyngeal erythema.   Eyes:      General: No scleral icterus.  Cardiovascular:      Rate and Rhythm: Normal rate and regular rhythm.      Heart sounds: Normal heart sounds.   Pulmonary:      Effort: Pulmonary effort is normal.      Breath sounds: Normal breath sounds.   Abdominal:      General: Abdomen is flat. Bowel sounds are normal. There is no distension.      Palpations: Abdomen is soft. There is no mass.      Tenderness: There is no abdominal tenderness. There is no guarding or rebound. Negative signs include Mendoza's sign.      Hernia: No hernia is present.   Musculoskeletal:      Cervical back: Neck supple.   Skin:     General: Skin is warm.      Capillary Refill: Capillary refill takes less than 2 seconds.   Neurological:      General: No focal deficit present.      Mental Status: He is alert and oriented to person, place, and time.   Psychiatric:         Mood and Affect: Mood normal.         Behavior: Behavior normal.         Thought Content: Thought content normal.         Judgment: Judgment normal.         CLINICAL DATA REVIEWED   reviewed previous lab results and integrated with today's visit, reviewed notes from other physicians and/or last GI encounter, reviewed previous endoscopy results and available photos, reviewed surgical pathology results from previous biopsies    ASSESSMENT  Diagnoses and all orders for this visit:    Ulcerative pancolitis without complication    Gastroesophageal reflux disease with esophagitis, unspecified whether hemorrhage    Swollen breast  -     US breast left complete; Future    Hypertrophy of breast  -     US breast " left complete; Future    Other orders  -     Adalimumab (Humira, 2 Pen,) 40 MG/0.4ML Pen-injector Kit; Inject 40 mg under the skin into the appropriate area as directed Every 14 (Fourteen) Days.          PLAN    Continue Humira  Next colon due in 2026    Return in about 6 months (around 12/17/2024).    I have discussed the above plan with the patient.  They verbalize understanding and are in agreement with the plan.  They have been advised to contact the office for any questions, concerns, or changes related to their health.

## 2024-06-24 ENCOUNTER — TELEPHONE (OUTPATIENT)
Dept: GASTROENTEROLOGY | Facility: CLINIC | Age: 69
End: 2024-06-24
Payer: MEDICARE

## 2024-06-24 DIAGNOSIS — N63.20 MASS OF LEFT BREAST, UNSPECIFIED QUADRANT: Primary | ICD-10-CM

## 2024-06-24 DIAGNOSIS — D49.3 NEOPLASM OF UNSPECIFIED BEHAVIOR OF BREAST: ICD-10-CM

## 2024-06-24 NOTE — TELEPHONE ENCOUNTER
In Leela's absence, please put in new order/referral for limited breast US of left breast rather than the complete that is currently ordered per radiology.  Thank you.

## 2024-06-25 ENCOUNTER — TELEPHONE (OUTPATIENT)
Dept: GASTROENTEROLOGY | Facility: CLINIC | Age: 69
End: 2024-06-25
Payer: MEDICARE

## 2024-06-25 NOTE — TELEPHONE ENCOUNTER
Pt calls stating that radiology is now requiring a mammogram prior to the US (see 6/24/24 message/order).  I confirmed with Hiral, in radiolog, that a Bilateral Diagnostic mammogram prior to the limited Unilateral breast ultrasound is protocol for his left chest swelling.  Would you mind ordering this, as well, in Leela's absence?  Pt is anxious to get it scheduled.

## 2024-06-26 DIAGNOSIS — D48.62 NEOPLASM OF UNCERTAIN BEHAVIOR OF LEFT BREAST: ICD-10-CM

## 2024-06-26 DIAGNOSIS — N63.20 MASS OF LEFT BREAST, UNSPECIFIED QUADRANT: Primary | ICD-10-CM

## 2024-07-25 ENCOUNTER — OFFICE VISIT (OUTPATIENT)
Dept: CARDIOLOGY | Facility: CLINIC | Age: 69
End: 2024-07-25
Payer: MEDICARE

## 2024-07-25 VITALS
SYSTOLIC BLOOD PRESSURE: 124 MMHG | WEIGHT: 171.4 LBS | HEART RATE: 65 BPM | BODY MASS INDEX: 25.39 KG/M2 | DIASTOLIC BLOOD PRESSURE: 70 MMHG | OXYGEN SATURATION: 97 % | HEIGHT: 69 IN

## 2024-07-25 DIAGNOSIS — I44.7 LBBB (LEFT BUNDLE BRANCH BLOCK): Chronic | ICD-10-CM

## 2024-07-25 DIAGNOSIS — Z86.16 HISTORY OF 2019 NOVEL CORONAVIRUS DISEASE (COVID-19): ICD-10-CM

## 2024-07-25 DIAGNOSIS — Z86.79 HISTORY OF CARDIOMYOPATHY: Chronic | ICD-10-CM

## 2024-07-25 DIAGNOSIS — I10 PRIMARY HYPERTENSION: Primary | Chronic | ICD-10-CM

## 2024-07-25 PROCEDURE — 1159F MED LIST DOCD IN RCRD: CPT | Performed by: INTERNAL MEDICINE

## 2024-07-25 PROCEDURE — 93000 ELECTROCARDIOGRAM COMPLETE: CPT | Performed by: INTERNAL MEDICINE

## 2024-07-25 PROCEDURE — 1160F RVW MEDS BY RX/DR IN RCRD: CPT | Performed by: INTERNAL MEDICINE

## 2024-07-25 PROCEDURE — 3074F SYST BP LT 130 MM HG: CPT | Performed by: INTERNAL MEDICINE

## 2024-07-25 PROCEDURE — 99214 OFFICE O/P EST MOD 30 MIN: CPT | Performed by: INTERNAL MEDICINE

## 2024-07-25 PROCEDURE — 3078F DIAST BP <80 MM HG: CPT | Performed by: INTERNAL MEDICINE

## 2024-07-25 RX ORDER — CEFUROXIME AXETIL 500 MG/1
500 TABLET ORAL 2 TIMES DAILY
COMMUNITY
Start: 2024-07-22

## 2024-07-25 NOTE — PROGRESS NOTES
Subjective:     Encounter Date:  07/25/24        Patient ID: Teofilo Jade is a 68 y.o. male.    Chief Complaint:    Dear Dr. Koroma,    This patient comes in for routine follow-up of their cardiac status.  It has been one year since his last visit. He has a history of cardiomyopathy with recovery of function.    It has been 1 year since his last visit.  About a month ago had COVID, still weak and fatigue, leg still feel weaker than typical.  No chest pain, no shortness of breath, no cough.    His most recent echocardiogram was formed in 2023, reviewed in detail below, ejection fraction 51-55%.    His history dates back to 2012 when he was found to have diminished left ventricular function.  He was seen by Dr. Weiss at that time.  He had a stress test and then a cardiac catheterization which documented a nonischemic cardiomyopathy.  He was treated with medical therapy.  In 2014 he was seen again by Dr. Malloy and he had a Lexiscan Cardiolite performed.  No ischemia was seen in the ejection fraction was 50%.    The following portions of the patient's history were reviewed and updated as appropriate: allergies, current medications, past family history, past medical history, past social history, past surgical history and problem list.    Past Medical History:   Diagnosis Date    Anxiety     Colon polyp     GERD (gastroesophageal reflux disease)     History of cardiomyopathy 03/28/2018    Hypertension     Nonischemic cardiomyopathy     SEBASTIAN (obstructive sleep apnea)     Ulcer of abdomen wall        Past Surgical History:   Procedure Laterality Date    COLONOSCOPY      COLONOSCOPY N/A 10/2/2017    Procedure: COLONOSCOPY, biopsy; polypectomy;  Surgeon: Sushila Amezquita MD;  Location: [facility] OR;  Service:     COLONOSCOPY N/A 4/28/2023    Procedure: COLONOSCOPY WITH BIOPSY;  Surgeon: Vini Lorenzana MD;  Location: [facility] OR;  Service: Gastroenterology;  Laterality: N/A;  Active Ulcerative colitis; Biopsies-  "right colon, transverse, sigmoid and rectal;    ENDOSCOPY N/A 7/10/2020    Procedure: ESOPHAGOGASTRODUODENOSCOPY, biopsy;  Surgeon: Vini Lorenzana MD;  Location: [facility] OR;  Service: Gastroenterology;  Laterality: N/A;  Ulcerative esophagitis; Biopsies- gastric and distal esophagus; gastritis    KNEE SURGERY  2002 2003    both knee             ECG 12 Lead    Date/Time: 7/25/2024 12:41 PM  Performed by: Sunil Wolf III, MD    Authorized by: Sunil Wolf III, MD  Comparison: compared with previous ECG   Similar to previous ECG  Rhythm: sinus rhythm  Rate: normal  Conduction: left bundle branch block  QRS axis: left  Other: no other findings    Clinical impression: abnormal EKG             Objective:     Vitals:    07/25/24 1035   BP: 124/70   BP Location: Left arm   Patient Position: Sitting   Cuff Size: Adult   Pulse: 65   SpO2: 97%   Weight: 77.7 kg (171 lb 6.4 oz)   Height: 174 cm (68.5\")           Physical Exam  Constitutional:       General: He is not in acute distress.     Appearance: He is well-developed. He is not diaphoretic.   HENT:      Head: Normocephalic and atraumatic.      Nose: Nose normal.   Eyes:      General:         Right eye: No discharge.         Left eye: No discharge.      Conjunctiva/sclera: Conjunctivae normal.      Pupils: Pupils are equal, round, and reactive to light.   Neck:      Thyroid: No thyromegaly.      Trachea: No tracheal deviation.   Cardiovascular:      Rate and Rhythm: Normal rate and regular rhythm.      Pulses: Normal pulses.      Heart sounds: Normal heart sounds, S1 normal and S2 normal.      No S3 sounds.   Pulmonary:      Effort: Pulmonary effort is normal. No respiratory distress.      Breath sounds: Normal breath sounds. No stridor.   Chest:      Chest wall: No tenderness.   Abdominal:      General: Bowel sounds are normal. There is no distension.      Palpations: Abdomen is soft. There is no mass.      Tenderness: There is no abdominal tenderness. " There is no guarding or rebound.   Musculoskeletal:         General: No tenderness or deformity. Normal range of motion.      Cervical back: Normal range of motion and neck supple.   Lymphadenopathy:      Cervical: No cervical adenopathy.   Skin:     General: Skin is warm and dry.      Findings: No erythema or rash.   Neurological:      Mental Status: He is alert and oriented to person, place, and time.      Deep Tendon Reflexes: Reflexes are normal and symmetric.   Psychiatric:         Thought Content: Thought content normal.         Lab Review:             Results for orders placed during the hospital encounter of 08/09/23    Adult Transthoracic Echo Complete W/ Cont if Necessary Per Protocol    Interpretation Summary    Left ventricular systolic function is low normal. Left ventricular ejection fraction appears to be 51 - 55%.  Abnormal septal wall motion consistent with bundle branch block.            Assessment:          Diagnosis Plan   1. Primary hypertension  ECG 12 Lead      2. LBBB (left bundle branch block)  ECG 12 Lead      3. History of cardiomyopathy  ECG 12 Lead      4. History of 2019 novel coronavirus disease (COVID-19)  ECG 12 Lead             Plan:       1.  hypertension-continue current medical regimen, excellent control on the carvedilol, and losartan.  2.  History of cardiomyopathy with recovery of function-most recent ejection fraction 51-55%, continue current medical regimen with his carvedilol and losartan, continue spironolactone, creatinine, BUN, potassium reviewed,   3.  Obstructive sleep apnea on CPAP  4.  Diagnosed with ulcerative colitis.  He follows with GI  5. LBBB  6.  Recent COVID-still very fatigued.    Thank you very much for allowing us to participate in the care of this pleasant patient.  Please don't hesitate to call if I can be of assistance in any way.      Current Outpatient Medications:     Adalimumab (Humira, 2 Pen,) 40 MG/0.4ML Pen-injector Kit, Inject 40 mg under the  skin into the appropriate area as directed Every 14 (Fourteen) Days., Disp: 2 each, Rfl: 11    carvedilol (COREG) 3.125 MG tablet, TAKE 1 TABLET BY MOUTH 2 TIMES A DAY AS DIRECTED., Disp: 180 tablet, Rfl: 1    cefuroxime (CEFTIN) 500 MG tablet, Take 1 tablet by mouth 2 (Two) Times a Day., Disp: , Rfl:     losartan (COZAAR) 25 MG tablet, Take 1 tablet by mouth Daily., Disp: 90 tablet, Rfl: 2    omeprazole (priLOSEC) 40 MG capsule, TAKE 1 CAPSULE BY MOUTH DAILY., Disp: 90 capsule, Rfl: 3    rosuvastatin (CRESTOR) 5 MG tablet, Take 1 tablet by mouth Daily., Disp: , Rfl:     spironolactone (ALDACTONE) 25 MG tablet, Take 1 tablet by mouth Daily., Disp: 90 tablet, Rfl: 2    traZODone (DESYREL) 100 MG tablet, Take 1 tablet by mouth Every Night., Disp: , Rfl:     zolpidem CR (AMBIEN CR) 12.5 MG CR tablet, Take  by mouth., Disp: , Rfl:

## 2024-08-15 ENCOUNTER — HOSPITAL ENCOUNTER (OUTPATIENT)
Dept: MAMMOGRAPHY | Facility: HOSPITAL | Age: 69
Discharge: HOME | End: 2024-08-15
Payer: MEDICARE

## 2024-08-15 ENCOUNTER — HOSPITAL ENCOUNTER (OUTPATIENT)
Dept: ULTRASOUND IMAGING | Facility: HOSPITAL | Age: 69
Discharge: HOME | End: 2024-08-15
Payer: MEDICARE

## 2024-08-15 DIAGNOSIS — D48.62 NEOPLASM OF UNCERTAIN BEHAVIOR OF LEFT BREAST: ICD-10-CM

## 2024-08-15 DIAGNOSIS — N63.20 MASS OF LEFT BREAST, UNSPECIFIED QUADRANT: ICD-10-CM

## 2024-08-15 DIAGNOSIS — D49.3 NEOPLASM OF UNSPECIFIED BEHAVIOR OF BREAST: ICD-10-CM

## 2024-08-15 DIAGNOSIS — R92.8 ABNORMAL MAMMOGRAM: Primary | ICD-10-CM

## 2024-08-15 PROCEDURE — G0279 TOMOSYNTHESIS, MAMMO: HCPCS

## 2024-08-15 PROCEDURE — 77066 DX MAMMO INCL CAD BI: CPT | Performed by: RADIOLOGY

## 2024-08-15 PROCEDURE — 76642 ULTRASOUND BREAST LIMITED: CPT

## 2024-08-15 PROCEDURE — 76642 ULTRASOUND BREAST LIMITED: CPT | Performed by: RADIOLOGY

## 2024-08-15 PROCEDURE — 77066 DX MAMMO INCL CAD BI: CPT

## 2024-08-15 PROCEDURE — G0279 TOMOSYNTHESIS, MAMMO: HCPCS | Performed by: RADIOLOGY

## 2024-08-16 ENCOUNTER — TELEPHONE (OUTPATIENT)
Dept: SURGERY | Facility: CLINIC | Age: 69
End: 2024-08-16
Payer: MEDICARE

## 2024-08-16 ENCOUNTER — TELEPHONE (OUTPATIENT)
Dept: SURGERY | Facility: CLINIC | Age: 69
End: 2024-08-16

## 2024-08-16 ENCOUNTER — TELEPHONE (OUTPATIENT)
Dept: GASTROENTEROLOGY | Facility: CLINIC | Age: 69
End: 2024-08-16
Payer: MEDICARE

## 2024-08-16 NOTE — TELEPHONE ENCOUNTER
Called pt   Per JS: Hold Humira until further notice, will follow up after biopsy next course of action and POC. Determine if continue to hold or resume.

## 2024-08-26 ENCOUNTER — OFFICE VISIT (OUTPATIENT)
Dept: SURGERY | Facility: CLINIC | Age: 69
End: 2024-08-26
Payer: MEDICARE

## 2024-08-26 ENCOUNTER — HOSPITAL ENCOUNTER (OUTPATIENT)
Dept: SURGERY | Facility: HOSPITAL | Age: 69
Discharge: HOME | End: 2024-08-26
Payer: MEDICARE

## 2024-08-26 VITALS
HEIGHT: 69 IN | OXYGEN SATURATION: 97 % | DIASTOLIC BLOOD PRESSURE: 78 MMHG | SYSTOLIC BLOOD PRESSURE: 132 MMHG | HEART RATE: 74 BPM | WEIGHT: 172.8 LBS | BODY MASS INDEX: 25.59 KG/M2

## 2024-08-26 DIAGNOSIS — Z87.891 FORMER SMOKER, STOPPED SMOKING IN DISTANT PAST: ICD-10-CM

## 2024-08-26 DIAGNOSIS — R92.8 ABNORMAL ULTRASOUND OF BREAST: ICD-10-CM

## 2024-08-26 DIAGNOSIS — N63.23 MASS OF LOWER OUTER QUADRANT OF LEFT BREAST: Primary | ICD-10-CM

## 2024-08-26 DIAGNOSIS — K51.919 ULCERATIVE COLITIS WITH COMPLICATION, UNSPECIFIED LOCATION: ICD-10-CM

## 2024-08-26 PROCEDURE — 99204 OFFICE O/P NEW MOD 45 MIN: CPT | Performed by: SURGERY

## 2024-08-26 PROCEDURE — 88305 TISSUE EXAM BY PATHOLOGIST: CPT | Performed by: SURGERY

## 2024-08-26 PROCEDURE — 1159F MED LIST DOCD IN RCRD: CPT | Performed by: SURGERY

## 2024-08-26 PROCEDURE — 1160F RVW MEDS BY RX/DR IN RCRD: CPT | Performed by: SURGERY

## 2024-08-26 NOTE — PROGRESS NOTES
Chief Complaint: Teofilo Jade is a 68 y.o. female who was seen in consultation at the request of Carmel BOSS  for breast mass    History of Present Illness:  Patient presents with breast mass, abnormal breast imaging, and breast pain.  He noted a breast mass 4 months ago- a fullness that he saw in the mirror, and he felt became more prominent and had some associated tenderness. He noted no other new masses, skin changes, nipple discharge, nipple changes prior to his most recent imaging.    His most recent imaging includes the followin/15/2024, Bilateral Breast Digital Diagnostic [facility] w/[facility] ([facility])  Patient reports left breast palpable x3 months and enlargement of the left breast.  The breasts are heterogeneously dense. Bilateral gynecomastia is present, left side greater than right.  LEFT BREAST ULTRASOUND  There is an oval hypoechoic mass with indistinct margins in the left breast subareolar region 5 o'clock position. Internal vascularity is present. This mass measures 0.9 x 0.8 x 0.8 cm.    BI-RADS 4   He has never had a breast mass or biopsy in the past      His family history includes the following: has one daughter, one son, no sisters, no MA, 1 PA. No Fh breast or ovarian cancer.  He is here for evaluation.    Review of Systems:  Review of Systems - Oncology     Past Medical and Surgical History:  Breast Biopsy History:  Patient has not had a breast biopsy in the past.  Breast Cancer HIstory:  Patient does not have a past medical history of breast cancer.  Breast Operations, and year:  None    Obstetric/Gynecologic History:  Age menstrual periods began: N/A   Patient is premenopausal, first day of last period: N/A   Number of pregnancies:N/A   Number of live births: N/A   Number of abortions or miscarriages: N/A   Age of delivery of first child: N/A  N/A  Length of time taking birth control pills:N/A  N/A    Past Surgical History:   Procedure Laterality Date    COLONOSCOPY      COLONOSCOPY  N/A 10/2/2017    Procedure: COLONOSCOPY, biopsy; polypectomy;  Surgeon: Sushila Amezquita MD;  Location: [facility] OR;  Service:     COLONOSCOPY N/A 4/28/2023    Procedure: COLONOSCOPY WITH BIOPSY;  Surgeon: Vini Lorenzana MD;  Location: [facility] OR;  Service: Gastroenterology;  Laterality: N/A;  Active Ulcerative colitis; Biopsies- right colon, transverse, sigmoid and rectal;    ENDOSCOPY N/A 7/10/2020    Procedure: ESOPHAGOGASTRODUODENOSCOPY, biopsy;  Surgeon: Vini Lorenzana MD;  Location: [facility] OR;  Service: Gastroenterology;  Laterality: N/A;  Ulcerative esophagitis; Biopsies- gastric and distal esophagus; gastritis    KNEE SURGERY  2002 2003    both knee     Past Medical History:   Diagnosis Date    Anxiety     Colon polyp     GERD (gastroesophageal reflux disease)     History of cardiomyopathy 03/28/2018    Hypertension     Nonischemic cardiomyopathy     SEBASTIAN (obstructive sleep apnea)     Ulcer of abdomen wall        Prior Hospitalizations, other than for surgery or childbirth, and year:  NONE     Social History     Socioeconomic History    Marital status: Single   Tobacco Use    Smoking status: Former     Passive exposure: Never    Smokeless tobacco: Never    Tobacco comments:     caffine 3 cups daily - quit 5 yrs ago   Vaping Use    Vaping status: Never Used   Substance and Sexual Activity    Alcohol use: Not Currently     Comment: occ     Drug use: No    Sexual activity: Defer     Patient is .  Patient is retired.  Patient drinks 4 servings of caffeine per day.    Family History:  Family History   Problem Relation Age of Onset    Heart disease Mother     Diabetes Mother     Stroke Mother     Hypertension Mother     Colon polyps Mother     Heart disease Father     Diabetes Father        Vital Signs:  There were no vitals taken for this visit.   See EPIC for vital signs    Medications:    Current Outpatient Medications:     Adalimumab (Humira, 2 Pen,) 40 MG/0.4ML Pen-injector Kit,  Inject 40 mg under the skin into the appropriate area as directed Every 14 (Fourteen) Days., Disp: 2 each, Rfl: 11    carvedilol (COREG) 3.125 MG tablet, TAKE 1 TABLET BY MOUTH 2 TIMES A DAY AS DIRECTED., Disp: 180 tablet, Rfl: 1    cefuroxime (CEFTIN) 500 MG tablet, Take 1 tablet by mouth 2 (Two) Times a Day., Disp: , Rfl:     losartan (COZAAR) 25 MG tablet, Take 1 tablet by mouth Daily., Disp: 90 tablet, Rfl: 2    omeprazole (priLOSEC) 40 MG capsule, TAKE 1 CAPSULE BY MOUTH DAILY., Disp: 90 capsule, Rfl: 3    rosuvastatin (CRESTOR) 5 MG tablet, Take 1 tablet by mouth Daily., Disp: , Rfl:     traZODone (DESYREL) 100 MG tablet, Take 1 tablet by mouth Every Night., Disp: , Rfl:     zolpidem CR (AMBIEN CR) 12.5 MG CR tablet, Take  by mouth., Disp: , Rfl:      Allergies:  Allergies   Allergen Reactions    Sulfa Antibiotics Rash     Per pt broke out in rash and blisters    Warfarin Rash       Physical Examination:  There were no vitals taken for this visit.  See Epic for vital signs.  General Appearance:  Patient is in no distress.  He is well kept and has an average build.   Psychiatric:  Patient with appropriate mood and affect. Alert and oriented to self, time, and place.    Breast, RIGHT:  medium sized, symmetric with the contralateral side. Some gynecomastia present, LEFT > RIGHT . Breast skin is without erythema, edema, rashes.  There are no visible abnormalities upon inspection during the arm-raising maneuver or with hands on hips in the sitting position. There is no nipple retraction, discharge or nipple/areolar skin changes.There are no masses palpable in the sitting or supine positions.    Breast, LEFT:  medium sized, symmetric with the contralateral side. Some gynecomastia present, LEFT > RIGHT .  Breast skin is without erythema, edema, rashes.  There are no visible abnormalities upon inspection during the arm-raising maneuver or with hands on hips in the sitting position. There is no nipple retraction,  discharge or nipple/areolar skin changes. There is a palpable thickening LEFT breast UOQ with a few distinct areas of nodularity within at the 12:00-1:00 location, 1 -1.5 CFN. THIS IS NOT THE AREA OF US ABNORMALITY. There are no other masses palpable in the sitting or supine positions. No findings specifically at the 5:00, 1 CFN area of sonographic abnormality.    Lymphatic:  There is no axillary, cervical, infraclavicular, or supraclavicular adenopathy bilaterally.  Eyes:  Pupils are round and reactive to light.  Cardiovascular:  Heart rate and rhythm are regular.  Respiratory:  Lungs are clear bilaterally with no crackles or wheezes in any lung field.  Gastrointestinal:  Abdomen is soft, nondistended, and nontender.     Musculoskeletal:  Good strength in all 4 extremities.   There is good range of motion in both shoulders.    Skin:  No new skin lesions or rashes on the skin excluding the breast (see breast exam above).        Imagin/15/2024, Bilateral Breast Digital Diagnostic [facility]G w/[facility] ([facility])  Patient reports left breast palpable x3 months and enlargement of the left breast.  The breasts are heterogeneously dense. Bilateral gynecomastia is present, left side greater than right.  LEFT BREAST ULTRASOUND  There is an oval hypoechoic mass with indistinct margins in the left breast subareolar region 5 o'clock position. Internal vascularity is present. This mass measures 0.9 x 0.8 x 0.8 cm.  BI-RADS 4       Procedures:  Percutaneous ultrasound-guided vacuum-assisted excisional breast biopsy 24  Indication:  ultrasound-visible breast mass, 9mm , not palpable  Location: LEFT 5:00 1 CFN, Areolar margin  Consent:  The risks, benefits, and alternatives to the procedure were discussed with the patient, who understood and wished to proceed.  The risks described included, but were not limited to, bleeding, infection, pneumothorax, and inadequate sampling requiring either repeat percutaneous or open excisional  biopsy.  Description of Procedure:   After the patient was positioned supine on the procedure table, I located the lesion using ultrasound.  I prepped and draped the breast skin in sterile fashion.  I anesthetized the breast skin at the site of anticipated mammotomy with 1% lidocaine with epinephrine.  I then anesthetized the underlying subcutaneous tissue and breast parenchyma surrounding the lesion with 1% lidocaine with epinephrine under ultrasound visualization and guidance. I then made a nicking incision with an 11blade and inserted the 10G encore biopsy device from inferolateral to superomedial  under the lesion under ultrasound guidance.   I then took 5 sequential core samples using the automated sampling of the encore device, until the lesion disappeared on ultrasound. There was minimal residual lesion visible at the conclusion of the procedure.  I removed the probe, then placed a hydromark marker, using a stiff introducer, into the biopsy site. We held manual compression for 10 minutes, placed steri-strips at the mammotomy site, and wrapped the patient in a 6 inch super ace wrap with an ice-pack.  Marker placed: hydromark  Tolerance: The patient tolerated the procedure well.  Disposition: We will see him back within a week to review his pathology.     Assessment:   Diagnosis Plan   1. Mass of lower outer quadrant of left breast  MRI Breast Bilateral Diagnostic W WO Contrast      2. Abnormal ultrasound of breast        3. Ulcerative colitis with complication, unspecified location        4. Former smoker, stopped smoking in distant past        1-LEFT UOQ 12-1:00 inner ring, 1-1.5 CFN-  palpable on exam, area of thickening, density that is asymmetric from the RIGHT    2-LEFT 5:00 areolar margin, 1 cfn- seen on ultrasound - NOT the palpable finding above. Not palpable.  9mm on ultrasound seen in context of gynecomastia on mammogram  Target for biopsy today      3-   Ulcerative colitis, DX in 2016- takes Humira  for one year, twice monthly- sees Leela Soliz for this. , had prior mesalamine failure.    4- 30 PYH smoking- stopped 2005.      Plan:  The patient goes by Teofilo. He is here unaccompanied today.  He is friends and rides motorcycles with our patient Neelima Mccarty and they have discussed being mutual patients.    We reviewed his most recent imaging, his history, and his exam together today.  The lesion of concern on his imaging is: LEFT 5:00 Am lesion, 1 CFN- NOT THE PALPABLE lesion.    We discussed that the designation of a BIRADS 4 signifies that there is a 3-89% possibility of malignancy accounting for the imaging finding. We discussed the recommendation to biopsy all BIRADS 4 lesions. We discussed a second option, and recommended only if 1- the above lesion was not technically amenable to either percutaneous or to surgical biopsy, or 2-if the patient refused biopsy. This option would be imaging surveillance in 3-6 months. He understood and wished to proceed with biopsy.  We plan to perform the following biopsy : LEFT breast ultrasound guided vacuum assist biopsy.    I will plan for an MRI , whether or not it is malignant, as we need clarification of the palpable finding UOQ with no imaging correlate.    If it is malignant, we would have him come for genetic testing.  I will call him with the pathology report as it returns, and we have given him after care instructions post biopsy.       Shannon Andrade MD      Today I spent 45 minutes doing the following: Reviewing records, labs, outside imaging and reports in preparation for the patient visit; obtaining medical history; performing the physical exam; counseling and educating the patient and any available family or caregivers; ordering medications, tests or procedures; coordinating care with any other physicians on her care team as needed, and documenting all of the above in the medical record as well as sending communications with her other healthcare  professionals.    I spent additional time performing a biopsy in the office on same date of service, based on our evaluation.      Next Appointment:  Return for post biopsy visit, after imaging.      EMR Dragon/transcription disclaimer:    Please note that portions of this note were completed with a voice recognition program.

## 2024-08-27 LAB
CYTO UR: NORMAL
LAB AP CASE REPORT: NORMAL
LAB AP CLINICAL INFORMATION: NORMAL
LAB AP DIAGNOSIS COMMENT: NORMAL
PATH REPORT.FINAL DX SPEC: NORMAL
PATH REPORT.GROSS SPEC: NORMAL

## 2024-08-28 ENCOUNTER — TELEPHONE (OUTPATIENT)
Dept: SURGERY | Facility: CLINIC | Age: 69
End: 2024-08-28
Payer: MEDICARE

## 2024-08-28 NOTE — TELEPHONE ENCOUNTER
I let him know that his bippsy was benign. Plan to proceed with MRI.   [facility] Breast MRI scheduled Sun 9/15/24 arrive by 1:30-[place] [facility]  Genetic testing in office-I will ask Dr. Shannon Andrade if she would like to send  Follow up with Dr. Andrade Fri 9/20/24 @ 1:30 pm

## 2024-08-28 NOTE — TELEPHONE ENCOUNTER
Pathology from in office biopsy returned as :  LEFT breast 5:00, 1 CFN- gynecomastoid hyperplasia and focal fibroadenomatoid change. We will let him know and proceed with MRI        Final Diagnosis   1. Left breast, 5:00, 1 cm from nipple, ultrasound-guided core needle biopsy (coil clip):         A.  Breast parenchyma with gynecomastoid hyperplasia and focal fibroadenomatoid change.         B.  Negative for atypical hyperplasia or carcinoma.   Electronically signed by Luh Barreto MD on 8/27/2024 at 1606

## 2024-08-29 ENCOUNTER — TELEPHONE (OUTPATIENT)
Dept: SURGERY | Facility: CLINIC | Age: 69
End: 2024-08-29
Payer: MEDICARE

## 2024-09-06 LAB
CYTO UR: NORMAL
LAB AP CASE REPORT: NORMAL
LAB AP CLINICAL INFORMATION: NORMAL
LAB AP DIAGNOSIS COMMENT: NORMAL
PATH REPORT.ADDENDUM SPEC: NORMAL
PATH REPORT.FINAL DX SPEC: NORMAL
PATH REPORT.GROSS SPEC: NORMAL

## 2024-09-15 ENCOUNTER — HOSPITAL ENCOUNTER (OUTPATIENT)
Dept: MRI IMAGING | Facility: HOSPITAL | Age: 69
Discharge: HOME | End: 2024-09-15
Admitting: SURGERY
Payer: MEDICARE

## 2024-09-15 DIAGNOSIS — N63.23 MASS OF LOWER OUTER QUADRANT OF LEFT BREAST: ICD-10-CM

## 2024-09-15 PROCEDURE — A9577 INJ MULTIHANCE: HCPCS | Performed by: SURGERY

## 2024-09-15 PROCEDURE — C8908 MRI W/O FOL W/CONT, BREAST,: HCPCS

## 2024-09-15 PROCEDURE — 0 GADOBENATE DIMEGLUMINE 529 MG/ML SOLUTION: Performed by: SURGERY

## 2024-09-15 PROCEDURE — C8937 CAD BREAST MRI: HCPCS

## 2024-09-15 RX ADMIN — GADOBENATE DIMEGLUMINE 16 ML: 529 INJECTION, SOLUTION INTRAVENOUS at 15:00

## 2024-09-16 ENCOUNTER — TELEPHONE (OUTPATIENT)
Dept: SURGERY | Facility: CLINIC | Age: 69
End: 2024-09-16
Payer: MEDICARE

## 2024-09-16 DIAGNOSIS — R92.8 ABNORMAL MRI, BREAST: Primary | ICD-10-CM

## 2024-09-19 ENCOUNTER — TELEPHONE (OUTPATIENT)
Dept: SURGERY | Facility: CLINIC | Age: 69
End: 2024-09-19
Payer: MEDICARE

## 2024-09-20 ENCOUNTER — HOSPITAL ENCOUNTER (OUTPATIENT)
Dept: MAMMOGRAPHY | Facility: HOSPITAL | Age: 69
Discharge: HOME | End: 2024-09-20
Payer: MEDICARE

## 2024-09-20 ENCOUNTER — HOSPITAL ENCOUNTER (OUTPATIENT)
Dept: MRI IMAGING | Facility: HOSPITAL | Age: 69
Discharge: HOME | End: 2024-09-20
Payer: MEDICARE

## 2024-09-20 VITALS
HEART RATE: 76 BPM | DIASTOLIC BLOOD PRESSURE: 75 MMHG | TEMPERATURE: 98.2 F | RESPIRATION RATE: 16 BRPM | OXYGEN SATURATION: 96 % | SYSTOLIC BLOOD PRESSURE: 139 MMHG | WEIGHT: 162 LBS | BODY MASS INDEX: 24.55 KG/M2 | HEIGHT: 68 IN

## 2024-09-20 DIAGNOSIS — R92.8 ABNORMAL MRI, BREAST: ICD-10-CM

## 2024-09-20 LAB — CREAT BLDA-MCNC: 0.9 MG/DL (ref 0.6–1.3)

## 2024-09-20 PROCEDURE — A4648 IMPLANTABLE TISSUE MARKER: HCPCS

## 2024-09-20 PROCEDURE — 88305 TISSUE EXAM BY PATHOLOGIST: CPT | Performed by: SURGERY

## 2024-09-20 PROCEDURE — 0 GADOBENATE DIMEGLUMINE 529 MG/ML SOLUTION: Performed by: SURGERY

## 2024-09-20 PROCEDURE — 82565 ASSAY OF CREATININE: CPT

## 2024-09-20 PROCEDURE — C1894 INTRO/SHEATH, NON-LASER: HCPCS

## 2024-09-20 PROCEDURE — 77065 DX MAMMO INCL CAD UNI: CPT

## 2024-09-20 PROCEDURE — A9577 INJ MULTIHANCE: HCPCS | Performed by: SURGERY

## 2024-09-20 PROCEDURE — 25010000002 LIDOCAINE 1 % SOLUTION: Performed by: SURGERY

## 2024-09-20 RX ORDER — LIDOCAINE HYDROCHLORIDE 10 MG/ML
1 INJECTION, SOLUTION INFILTRATION; PERINEURAL ONCE
Status: COMPLETED | OUTPATIENT
Start: 2024-09-20 | End: 2024-09-20

## 2024-09-20 RX ADMIN — LIDOCAINE HYDROCHLORIDE 1 ML: 10 INJECTION, SOLUTION INFILTRATION; PERINEURAL at 10:43

## 2024-09-20 RX ADMIN — LIDOCAINE HYDROCHLORIDE 15 ML: 10; .005 INJECTION, SOLUTION EPIDURAL; INFILTRATION; INTRACAUDAL; PERINEURAL at 10:43

## 2024-09-20 RX ADMIN — GADOBENATE DIMEGLUMINE 17 ML: 529 INJECTION, SOLUTION INTRAVENOUS at 10:24

## 2024-09-21 ENCOUNTER — TELEPHONE (OUTPATIENT)
Dept: INTERVENTIONAL RADIOLOGY/VASCULAR | Facility: HOSPITAL | Age: 69
End: 2024-09-21
Payer: MEDICARE

## 2024-09-25 ENCOUNTER — TELEPHONE (OUTPATIENT)
Dept: SURGERY | Facility: CLINIC | Age: 69
End: 2024-09-25
Payer: MEDICARE

## 2024-09-30 RX ORDER — OMEPRAZOLE 40 MG/1
40 CAPSULE, DELAYED RELEASE ORAL DAILY
Qty: 90 CAPSULE | Refills: 0 | Status: SHIPPED | OUTPATIENT
Start: 2024-09-30

## 2024-10-04 ENCOUNTER — TELEPHONE (OUTPATIENT)
Dept: SURGERY | Facility: CLINIC | Age: 69
End: 2024-10-04
Payer: MEDICARE

## 2024-10-04 NOTE — TELEPHONE ENCOUNTER
----- Message from Roxane SHELIA sent at 10/2/2024 12:41 PM EDT -----  Pt called asking if her can resume his Humira. I couldn't find a note on this but Gregroy said she believes Dr. Andrade said it was fine to restart but I didn't want to tell him that in case this wasn't okay.  Will you let me know I told him I would call him tomorrow morning.     Will confirm on Monday. Confirmed with patient.

## 2024-10-08 ENCOUNTER — TELEPHONE (OUTPATIENT)
Dept: SURGERY | Facility: CLINIC | Age: 69
End: 2024-10-08
Payer: MEDICARE

## 2024-10-08 NOTE — TELEPHONE ENCOUNTER
Pt notified that his pathology from surgery with Dr. Andrade resulted benign and that he may restart his Humira.     CMA

## 2024-10-08 NOTE — TELEPHONE ENCOUNTER
[facility] bilateral breast MRI September 15, 2024:  Right breast mild gynecomastia.  Left breast asymmetric marked left gynecomastia.  6:00 posterior left breast 3.5 cm from the nipple there is a biopsy clip marking recent benign biopsy which is located within the anterior aspect of a 1.3 cm relatively ill-defined non-mass enhancement.  The asymmetry of the enhancement is likely postbiopsy in nature.  12:00 middle left breast 2.5 cm from the nipple is a 6 mm enhancing mass which is suspicious.  No abnormal axillary nodes.  Recommend MRI guided biopsy left breast.  Relatively ill-defined 1.3 cm non-mass enhancement at 6:00 posterior left breast represents the recent benign biopsy  .  At least some of this enhancement is postbiopsy in nature and within an area of relatively marked gynecomastia.  Short-term imaging should be considered.  BI-RADS 4      Left breast MRI biopsy [facility] September 20, 2024:  Enhancing mass upper middle left breast was targeted.  9 gauge vacuum-assisted device was inserted and 4 samples obtained.  A stoplight clip was deployed.  Pathology returned as fatty replaced breast parenchyma with gynecomastia Stanley changes and PASH.  Pathology is benign and concordant.        Final Diagnosis   1.  Left breast, 12:00, MRI-guided core needle biopsy (stoplight clip):               A.  Predominantly fatty replaced breast parenchyma with gynecomastoid-type changes and         pseudoangiomatous stromal hyperplasia (PASH).   Electronically signed by Tierney Machado MD on 9/21/2024 at 0827       We will let him know benign and that he may restart his humira  We will plan for a 6 month MRI

## 2024-10-18 NOTE — PROGRESS NOTES
Chief Complaint: Teofilo Jade is a 69 y.o. female who was seen in consultation at the request of Carmel BOSS  for breast mass and a followup visit    History of Present Illness:  Patient presents with breast mass, abnormal breast imaging, and breast pain.  He noted a breast mass 4 months ago- a fullness that he saw in the mirror, and he felt became more prominent and had some associated tenderness. He noted no other new masses, skin changes, nipple discharge, nipple changes prior to his most recent imaging.    His most recent imaging includes the followin/15/2024, Bilateral Breast Digital Diagnostic [facility] w/[facility] ([facility])  Patient reports left breast palpable x3 months and enlargement of the left breast.  The breasts are heterogeneously dense. Bilateral gynecomastia is present, left side greater than right.  LEFT BREAST ULTRASOUND  There is an oval hypoechoic mass with indistinct margins in the left breast subareolar region 5 o'clock position. Internal vascularity is present. This mass measures 0.9 x 0.8 x 0.8 cm.    BI-RADS 4   He has never had a breast mass or biopsy in the past      His family history includes the following: has one daughter, one son, no sisters, no MA, 1 PA. No Fh breast or ovarian cancer.      Interval History:  Pathology from in office biopsy returned as :  LEFT breast 5:00, 1 CFN- gynecomastoid hyperplasia and focal fibroadenomatoid change.    We discussed in the office that the ultrasound visible lesion was not the same as the palpable 12:00 thickening on exam, so we proceeded with MRI.    [facility] September 15, 2024:  Right breast mild gynecomastia.  Left breast asymmetric marked left gynecomastia.  6:00 posterior left breast 3.5 cm from the nipple there is a biopsy clip marking recent benign biopsy which is located within the anterior aspect of a 1.3 cm relatively ill-defined non-mass enhancement.  The asymmetry of the enhancement is  likely postbiopsy in nature.  12:00 middle left breast 2.5 cm from the nipple is a 6 mm enhancing mass which is suspicious.  No abnormal axillary nodes.  Recommend MRI guided biopsy left breast.  Relatively ill-defined 1.3 cm non-mass enhancement at 6:00 posterior left breast represents the recent benign biopsy  .  At least some of this enhancement is postbiopsy in nature and within an area of relatively marked gynecomastia.  Short-term imaging should be considered.  BI-RADS 4      We then arranged MRI guided biopsy:  Left breast MRI biopsy [facility] September 20, 2024:  Enhancing mass upper middle left breast was targeted.  9 gauge vacuum-assisted device was inserted and 4 samples obtained.  A stoplight clip was deployed.  Pathology returned as fatty replaced breast parenchyma with gynecomastia Stanley changes and PASH.  Pathology is benign and concordant.      He then restarted his humira    He is here for review.      Review of Systems:  Review of Systems   All other systems reviewed and are negative.       Past Medical and Surgical History:  Breast Biopsy History:  Patient has not had a breast biopsy in the past.  Breast Cancer HIstory:  Patient does not have a past medical history of breast cancer.  Breast Operations, and year:  None    Obstetric/Gynecologic History:  Age menstrual periods began: N/A   Patient is premenopausal, first day of last period: N/A   Number of pregnancies:N/A   Number of live births: N/A   Number of abortions or miscarriages: N/A   Age of delivery of first child: N/A  N/A  Length of time taking birth control pills:N/A  N/A    Past Surgical History:   Procedure Laterality Date    COLONOSCOPY      COLONOSCOPY N/A 10/2/2017    Procedure: COLONOSCOPY, biopsy; polypectomy;  Surgeon: Sushila Amezquita MD;  Location: [facility] OR;  Service:     COLONOSCOPY N/A 4/28/2023    Procedure: COLONOSCOPY WITH BIOPSY;  Surgeon: Vini Lorenzana MD;  Location: [facility] OR;  Service:  "Gastroenterology;  Laterality: N/A;  Active Ulcerative colitis; Biopsies- right colon, transverse, sigmoid and rectal;    ENDOSCOPY N/A 7/10/2020    Procedure: ESOPHAGOGASTRODUODENOSCOPY, biopsy;  Surgeon: Vini Lorenzana MD;  Location: [facility];  Service: Gastroenterology;  Laterality: N/A;  Ulcerative esophagitis; Biopsies- gastric and distal esophagus; gastritis    KNEE SURGERY  2002 2003    both knee     Past Medical History:   Diagnosis Date    Anxiety     Colon polyp     GERD (gastroesophageal reflux disease)     History of cardiomyopathy 03/28/2018    Hypertension     Nonischemic cardiomyopathy     SEBASTIAN (obstructive sleep apnea)     Ulcer of abdomen wall        Prior Hospitalizations, other than for surgery or childbirth, and year:  NONE     Social History     Socioeconomic History    Marital status: Single   Tobacco Use    Smoking status: Former     Passive exposure: Never    Smokeless tobacco: Never    Tobacco comments:     caffine 3 cups daily - quit 5 yrs ago   Vaping Use    Vaping status: Never Used   Substance and Sexual Activity    Alcohol use: Not Currently     Comment: occ     Drug use: No    Sexual activity: Defer     Patient is .  Patient is retired.  Patient drinks 4 servings of caffeine per day.    Family History:  Family History   Problem Relation Age of Onset    Heart disease Mother     Diabetes Mother     Stroke Mother     Hypertension Mother     Colon polyps Mother     Heart disease Father     Diabetes Father        Vital Signs:  /72 (BP Location: Left arm, Patient Position: Sitting, Cuff Size: Adult)   Pulse 91   Ht 174 cm (68.5\")   Wt 76.2 kg (168 lb)   SpO2 96%   BMI 25.17 kg/m²    See EPIC for vital signs    Medications:    Current Outpatient Medications:     Adalimumab (Humira, 2 Pen,) 40 MG/0.4ML Pen-injector Kit, Inject 40 mg under the skin into the appropriate area as directed Every 14 (Fourteen) Days., Disp: 2 each, Rfl: 11    carvedilol (COREG) 3.125 " "MG tablet, TAKE 1 TABLET BY MOUTH 2 TIMES A DAY AS DIRECTED., Disp: 180 tablet, Rfl: 1    cefuroxime (CEFTIN) 500 MG tablet, Take 1 tablet by mouth 2 (Two) Times a Day., Disp: , Rfl:     losartan (COZAAR) 25 MG tablet, Take 1 tablet by mouth Daily., Disp: 90 tablet, Rfl: 2    omeprazole (priLOSEC) 40 MG capsule, TAKE 1 CAPSULE BY MOUTH DAILY., Disp: 90 capsule, Rfl: 0    rosuvastatin (CRESTOR) 5 MG tablet, Take 1 tablet by mouth Daily., Disp: , Rfl:     traZODone (DESYREL) 100 MG tablet, Take 1 tablet by mouth Every Night., Disp: , Rfl:     zolpidem CR (AMBIEN CR) 12.5 MG CR tablet, Take  by mouth., Disp: , Rfl:      Allergies:  Allergies   Allergen Reactions    Sulfa Antibiotics Rash     Per pt broke out in rash and blisters    Warfarin Rash       Physical Examination:  /72 (BP Location: Left arm, Patient Position: Sitting, Cuff Size: Adult)   Pulse 91   Ht 174 cm (68.5\")   Wt 76.2 kg (168 lb)   SpO2 96%   BMI 25.17 kg/m²   See Epic for vital signs.  General Appearance:  Patient is in no distress.  He is well kept and has an average build.   Psychiatric:  Patient with appropriate mood and affect. Alert and oriented to self, time, and place.    Breast, RIGHT:  medium sized, symmetric with the contralateral side. Some gynecomastia present, LEFT > RIGHT . Breast skin is without erythema, edema, rashes.  There are no visible abnormalities upon inspection during the arm-raising maneuver or with hands on hips in the sitting position. There is no nipple retraction, discharge or nipple/areolar skin changes.There are no masses palpable in the sitting or supine positions.    Breast, LEFT:  medium sized, symmetric with the contralateral side. Some gynecomastia present, LEFT > RIGHT .  Breast skin is without erythema, edema, rashes.  There are no visible abnormalities upon inspection during the arm-raising maneuver or with hands on hips in the sitting position. There is no nipple retraction, discharge or " nipple/areolar skin changes. There is a palpable thickening LEFT breast UOQ with a few distinct areas of nodularity within at the 12:00-1:00 location, 1 -1.5 CFN. This area hs the correlate of the MRI lesion, which was biopsied and showed PASH and gyncomastoid hyperplasia. There are no other masses palpable in the sitting or supine positions. No findings specifically at the 5:00, 1 CFN area of sonographic abnormality, site of biopsy showed gynecomastoid hyperplasia.    Lymphatic:  There is no axillary, cervical, infraclavicular, or supraclavicular adenopathy bilaterally.  Eyes:  Pupils are round and reactive to light.  Cardiovascular:  Heart rate and rhythm are regular.  Respiratory:  Lungs are clear bilaterally with no crackles or wheezes in any lung field.  Gastrointestinal:  Abdomen is soft, nondistended, and nontender.     Musculoskeletal:  Good strength in all 4 extremities.   There is good range of motion in both shoulders.    Skin:  No new skin lesions or rashes on the skin excluding the breast (see breast exam above).        Imagin/15/2024, Bilateral Breast Digital Diagnostic [facility] w/[facility] ([facility])  Patient reports left breast palpable x3 months and enlargement of the left breast.  The breasts are heterogeneously dense. Bilateral gynecomastia is present, left side greater than right.  LEFT BREAST ULTRASOUND  There is an oval hypoechoic mass with indistinct margins in the left breast subareolar region 5 o'clock position. Internal vascularity is present. This mass measures 0.9 x 0.8 x 0.8 cm.  BI-RADS 4     [facility] bilateral breast MRI September 15, 2024:  Right breast mild gynecomastia.  Left breast asymmetric marked left gynecomastia.  6:00 posterior left breast 3.5 cm from the nipple there is a biopsy clip marking recent benign biopsy which is located within the anterior aspect of a 1.3 cm relatively ill-defined non-mass enhancement.  The asymmetry of the enhancement is likely postbiopsy  in nature.  12:00 middle left breast 2.5 cm from the nipple is a 6 mm enhancing mass which is suspicious.  No abnormal axillary nodes.  Recommend MRI guided biopsy left breast.  Relatively ill-defined 1.3 cm non-mass enhancement at 6:00 posterior left breast represents the recent benign biopsy  .  At least some of this enhancement is postbiopsy in nature and within an area of relatively marked gynecomastia.  Short-term imaging should be considered.  BI-RADS 4    Pathology:  Pathology from in office biopsy returned as :  LEFT breast 5:00, 1 CFN- gynecomastoid hyperplasia and focal fibroadenomatoid change. We will let him know and proceed with MRI          Final Diagnosis   1. Left breast, 5:00, 1 cm from nipple, ultrasound-guided core needle biopsy (coil clip):         A.  Breast parenchyma with gynecomastoid hyperplasia and focal fibroadenomatoid change.         B.  Negative for atypical hyperplasia or carcinoma.   Electronically signed by Luh Barreto MD on 8/27/2024 at 1606     Pathology from in office biopsy returned as :  LEFT breast 5:00, 1 CFN- gynecomastoid hyperplasia and focal fibroadenomatoid change. We will let him know and proceed with MRI           Final Diagnosis   1. Left breast, 5:00, 1 cm from nipple, ultrasound-guided core needle biopsy (coil clip):         A.  Breast parenchyma with gynecomastoid hyperplasia and focal fibroadenomatoid change.         B.  Negative for atypical hyperplasia or carcinoma.   Electronically signed by Luh Barreto MD on 8/27/2024 at 1606     MRI [facility] September 16, 2024:  Left breast asymmetric enlargement of the left breast with relatively marked left gynecomastia.  6:00 posterior left breast 3.5 cm from the nipple is a biopsy marker within a 1.3 cm area of ill-defined non-mass enhancement.  Asymmetry is likely postbiopsy.  Additionally there is linear enhancement extending laterally to the overlying skin from the biopsy tract.  At 12:00  middle left breast 2.5 posterior to the nipple there is a 6 mm enhancing mass which is suspicious.  BI-RADS 4 recommend MRI guided core needle biopsy of the left breast mass at 12:00.  We will arrange this and see him back after.       Left breast MRI biopsy [facility] September 20, 2024:  Enhancing mass upper middle left breast was targeted.  9 gauge vacuum-assisted device was inserted and 4 samples obtained.  A stoplight clip was deployed.  Pathology returned as fatty replaced breast parenchyma with gynecomastia Stanley changes and PASH.  Pathology is benign and concordant.           Final Diagnosis   1.  Left breast, 12:00, MRI-guided core needle biopsy (stoplight clip):               A.  Predominantly fatty replaced breast parenchyma with gynecomastoid-type changes and         pseudoangiomatous stromal hyperplasia (PASH).   Electronically signed by Tierney Machado MD on 9/21/2024 at 0827                 Procedures:  Percutaneous ultrasound-guided vacuum-assisted excisional breast biopsy 8-26-24  Indication:  ultrasound-visible breast mass, 9mm , not palpable  Location: LEFT 5:00 1 CFN, Areolar margin  Consent:  The risks, benefits, and alternatives to the procedure were discussed with the patient, who understood and wished to proceed.  The risks described included, but were not limited to, bleeding, infection, pneumothorax, and inadequate sampling requiring either repeat percutaneous or open excisional biopsy.  Description of Procedure:   After the patient was positioned supine on the procedure table, I located the lesion using ultrasound.  I prepped and draped the breast skin in sterile fashion.  I anesthetized the breast skin at the site of anticipated mammotomy with 1% lidocaine with epinephrine.  I then anesthetized the underlying subcutaneous tissue and breast parenchyma surrounding the lesion with 1% lidocaine with epinephrine under ultrasound visualization and guidance. I then made a nicking  incision with an 11blade and inserted the 10G encore biopsy device from inferolateral to superomedial  under the lesion under ultrasound guidance.   I then took 5 sequential core samples using the automated sampling of the encore device, until the lesion disappeared on ultrasound. There was minimal residual lesion visible at the conclusion of the procedure.  I removed the probe, then placed a hydromark marker, using a stiff introducer, into the biopsy site. We held manual compression for 10 minutes, placed steri-strips at the mammotomy site, and wrapped the patient in a 6 inch super ace wrap with an ice-pack.  Marker placed: hydromark  Tolerance: The patient tolerated the procedure well.  Disposition: We will see him back within a week to review his pathology.     Assessment:   Diagnosis Plan   1. Pseudoangiomatous stromal hyperplasia of breast        2. Gynecomastia, male        3. Abnormal MRI, breast        4. Abnormal ultrasound of breast        5. Mass of left breast, unspecified quadrant        6. Ulcerative colitis with complication, unspecified location        7. History of cigarette smoking          1-3,5-LEFT UOQ 12-1:00 inner ring, 1-1.5 CFN-  palpable on exam, area of thickening, density that is asymmetric from the RIGHT  - MRI showed a 6mm correlate showed a lesion 12:00, 2.5 CFN- MRI core biopsy 9-20-24 showed PASH and gynecomastoid hyperplasia- stoplight marker      2-4-LEFT 5:00 areolar margin, 1 cfn- seen on ultrasound - NOT the palpable finding above. Not palpable.  9mm on ultrasound seen in context of gynecomastia on mammogram  Target for biopsy in office, returned as gynecomastoid hyperplasia- hydromark marker left- on MRI 1.3 cm enhancement most likely post biopsy per MRI        6-  Ulcerative colitis, DX in 2016- takes Humira for one year, twice monthly- sees Leela Soliz for this. , had prior mesalamine failure.    7-- 30 PYH smoking- stopped 2005.      Plan:  The patient goes by Teofilo. He is  here unaccompanied today.  He is friends and rides motorcycles with our patient Neelima Mccarty and her  and they have discussed being mutual patients.    We reviewed his history from the presentation until her most recent data.  This includes that the initial lesion seen on ultrasound which we biopsied in the office was not palpable and was found to be benign gynecomastia toyed type hyperplasia.  Since there was a separate area that was palpable, we arranged the MRI which showed in the vicinity of the palpable abnormality a 6 mm area of enhancement.  MRI biopsy of this showed gynecomastia toyed hyperplasia and PASH and was felt to be concordant.  On the MRI there was some enhancement at the 6:00 biopsy site that was felt to be postbiopsy in nature.  Due to the complexity of his findings I would like a follow-up MRI in 6 months at the end of April and I will see him back in May to review this and his exam.  We did discuss gynecomastia today and the nature of the condition as men age.        Shannon Andrade MD      Today I spent 30minutes doing the following: Reviewing records, labs, outside imaging and reports in preparation for the patient visit; obtaining medical history; performing the physical exam; counseling and educating the patient and any available family or caregivers; ordering medications, tests or procedures; coordinating care with any other physicians on her care team as needed, and documenting all of the above in the medical record as well as sending communications with her other healthcare professionals.        Next Appointment:  Return for Next scheduled follow up, after imaging.      EMR Dragon/transcription disclaimer:    Please note that portions of this note were completed with a voice recognition program.

## 2024-10-21 ENCOUNTER — OFFICE VISIT (OUTPATIENT)
Dept: SURGERY | Facility: CLINIC | Age: 69
End: 2024-10-21
Payer: MEDICARE

## 2024-10-21 VITALS
WEIGHT: 168 LBS | DIASTOLIC BLOOD PRESSURE: 72 MMHG | HEART RATE: 91 BPM | OXYGEN SATURATION: 96 % | HEIGHT: 69 IN | SYSTOLIC BLOOD PRESSURE: 128 MMHG | BODY MASS INDEX: 24.88 KG/M2

## 2024-10-21 DIAGNOSIS — N64.89 PSEUDOANGIOMATOUS STROMAL HYPERPLASIA OF BREAST: Primary | ICD-10-CM

## 2024-10-21 DIAGNOSIS — Z87.891 HISTORY OF CIGARETTE SMOKING: ICD-10-CM

## 2024-10-21 DIAGNOSIS — R92.8 ABNORMAL MRI, BREAST: ICD-10-CM

## 2024-10-21 DIAGNOSIS — R92.8 ABNORMAL ULTRASOUND OF BREAST: ICD-10-CM

## 2024-10-21 DIAGNOSIS — N63.20 MASS OF LEFT BREAST, UNSPECIFIED QUADRANT: ICD-10-CM

## 2024-10-21 DIAGNOSIS — K51.919 ULCERATIVE COLITIS WITH COMPLICATION, UNSPECIFIED LOCATION: ICD-10-CM

## 2024-10-21 DIAGNOSIS — N62 GYNECOMASTIA, MALE: ICD-10-CM

## 2024-10-21 PROCEDURE — 3074F SYST BP LT 130 MM HG: CPT | Performed by: SURGERY

## 2024-10-21 PROCEDURE — 1159F MED LIST DOCD IN RCRD: CPT | Performed by: SURGERY

## 2024-10-21 PROCEDURE — 1160F RVW MEDS BY RX/DR IN RCRD: CPT | Performed by: SURGERY

## 2024-10-21 PROCEDURE — 99214 OFFICE O/P EST MOD 30 MIN: CPT | Performed by: SURGERY

## 2024-10-21 PROCEDURE — 3078F DIAST BP <80 MM HG: CPT | Performed by: SURGERY

## 2024-11-11 RX ORDER — OMEPRAZOLE 40 MG/1
40 CAPSULE, DELAYED RELEASE ORAL DAILY
Qty: 90 CAPSULE | Refills: 3 | Status: SHIPPED | OUTPATIENT
Start: 2024-11-11

## 2024-11-18 ENCOUNTER — OFFICE VISIT (OUTPATIENT)
Dept: GASTROENTEROLOGY | Facility: CLINIC | Age: 69
End: 2024-11-18
Payer: MEDICARE

## 2024-11-18 VITALS
HEIGHT: 69 IN | BODY MASS INDEX: 25.48 KG/M2 | SYSTOLIC BLOOD PRESSURE: 110 MMHG | DIASTOLIC BLOOD PRESSURE: 70 MMHG | WEIGHT: 172 LBS

## 2024-11-18 DIAGNOSIS — K21.00 GASTROESOPHAGEAL REFLUX DISEASE WITH ESOPHAGITIS WITHOUT HEMORRHAGE: ICD-10-CM

## 2024-11-18 DIAGNOSIS — K51.00 ULCERATIVE PANCOLITIS WITHOUT COMPLICATION: Primary | ICD-10-CM

## 2024-11-18 PROCEDURE — 1160F RVW MEDS BY RX/DR IN RCRD: CPT

## 2024-11-18 PROCEDURE — 99214 OFFICE O/P EST MOD 30 MIN: CPT

## 2024-11-18 PROCEDURE — 1159F MED LIST DOCD IN RCRD: CPT

## 2024-11-18 PROCEDURE — 3078F DIAST BP <80 MM HG: CPT

## 2024-11-18 PROCEDURE — 3074F SYST BP LT 130 MM HG: CPT

## 2024-11-18 RX ORDER — ESCITALOPRAM OXALATE 5 MG/1
5 TABLET ORAL DAILY
COMMUNITY
Start: 2024-11-04

## 2024-11-18 RX ORDER — SOLIFENACIN SUCCINATE 5 MG/1
TABLET, FILM COATED ORAL
COMMUNITY
Start: 2024-11-04

## 2024-11-18 NOTE — PROGRESS NOTES
PATIENT INFORMATION  Teofilo Jade       - 1955    CHIEF COMPLAINT  Chief Complaint   Patient presents with    Ulcerative pancolitis    Heartburn    Breast hypertrophy       HISTORY OF PRESENT ILLNESS    Here for UC follow-up     Back on Humira and up to date on TB and Hep B until April. Doing well. BM once a day, easy, well-controlled, no bleeding or cramping. Labs every 3 months per PCP.    Issues with UTIs since , frequency, started on vesicare recently with improvement.     L breast scare, 2 bx and 2 MRI came back normal. Dx with PASH.     GERD: Managed on 40 mg omeprazole daily. No HB or indigestion. Not having any problems.     2023 Colon active UC rectum and sigmoid  7/10/2020 last EGD for dysphagia positive for ulcerative esophagitis, HP gastritis, negative for Barretts.   10/2/2017 Colon with UC active in ascending colon.        REVIEWED PERTINENT RESULTS/ LABS  Lab Results   Component Value Date    CASEREPORT  2024     Surgical Pathology Report                         Case: [id number]                                  Authorizing Provider:  Shannon Andrade MD    Collected:           2024 10:52 AM          Ordering Location:     [facility]  Received:            2024 11:35 AM                                 MRI                                                                          Pathologist:           Tierney Machado MD                                                          Specimen:    Breast, Left, Left breast mri bx by Dr. Osuna/ bx time 10:52/ formalin time 11:05/                # cores 4/ 9 gauge probe/ clock face 12:00                                                 FINALDX  2024     1.  Left breast, 12:00, MRI-guided core needle biopsy (stoplight clip):   A.  Predominantly fatty replaced breast parenchyma with gynecomastoid-type changes and         pseudoangiomatous stromal hyperplasia (PASH).       Lab Results   Component Value  Date    HGB 14.6 04/11/2023    MCV 83.2 04/11/2023     04/11/2023    ALT 8 10/17/2019    AST 11 10/17/2019      No results found.    REVIEW OF SYSTEMS  Review of Systems   Constitutional:  Negative for activity change, chills, fever and unexpected weight change.   HENT:  Negative for congestion.    Eyes:  Negative for visual disturbance.   Respiratory:  Negative for shortness of breath.    Cardiovascular:  Negative for chest pain and palpitations.   Gastrointestinal:  Positive for diarrhea. Negative for abdominal pain and blood in stool.   Endocrine: Negative for cold intolerance and heat intolerance.   Genitourinary:  Negative for hematuria.   Musculoskeletal:  Negative for gait problem.   Skin:  Negative for color change.   Allergic/Immunologic: Negative for immunocompromised state.   Neurological:  Negative for weakness and light-headedness.   Hematological:  Negative for adenopathy.   Psychiatric/Behavioral:  Negative for sleep disturbance. The patient is not nervous/anxious.          ACTIVE PROBLEMS  Patient Active Problem List    Diagnosis     History of 2019 novel coronavirus disease (COVID-19) [Z86.16]     Gastroesophageal reflux disease with esophagitis [K21.00]     Screening-pulmonary TB [Z11.1]     Osteopenia [M85.80]     Ulcerative pancolitis without complication [K51.00]     Nasal drainage [J34.89]     LBBB (left bundle branch block) [I44.7]     Dysphagia [R13.10]     Non-intractable vomiting [R11.10]     History of cardiomyopathy [Z86.79]     Ulcerative pancolitis without complication [K51.00]     Rectal bleeding [K62.5]     Generalized abdominal pain [R10.84]     Diarrhea [R19.7]     Hypertension [I10]     SEBASTIAN (obstructive sleep apnea) [G47.33]          PAST MEDICAL HISTORY  Past Medical History:   Diagnosis Date    Anxiety     Colon polyp     GERD (gastroesophageal reflux disease)     History of cardiomyopathy 03/28/2018    Hypertension     Nonischemic cardiomyopathy     SEBASTIAN (obstructive sleep  apnea)     Ulcer of abdomen wall          SURGICAL HISTORY  Past Surgical History:   Procedure Laterality Date    COLONOSCOPY      COLONOSCOPY N/A 10/2/2017    Procedure: COLONOSCOPY, biopsy; polypectomy;  Surgeon: Sushila Amezquita MD;  Location: [facility] OR;  Service:     COLONOSCOPY N/A 4/28/2023    Procedure: COLONOSCOPY WITH BIOPSY;  Surgeon: Vini Lorenzana MD;  Location: [facility] OR;  Service: Gastroenterology;  Laterality: N/A;  Active Ulcerative colitis; Biopsies- right colon, transverse, sigmoid and rectal;    ENDOSCOPY N/A 7/10/2020    Procedure: ESOPHAGOGASTRODUODENOSCOPY, biopsy;  Surgeon: Vini Lorenzana MD;  Location: [facility] OR;  Service: Gastroenterology;  Laterality: N/A;  Ulcerative esophagitis; Biopsies- gastric and distal esophagus; gastritis    KNEE SURGERY  2002 2003    both knee         FAMILY HISTORY  Family History   Problem Relation Age of Onset    Heart disease Mother     Diabetes Mother     Stroke Mother     Hypertension Mother     Colon polyps Mother     Heart disease Father     Diabetes Father          SOCIAL HISTORY  Social History     Occupational History    Not on file   Tobacco Use    Smoking status: Former     Passive exposure: Never    Smokeless tobacco: Never    Tobacco comments:     caffine 3 cups daily - quit 5 yrs ago   Vaping Use    Vaping status: Never Used   Substance and Sexual Activity    Alcohol use: Not Currently     Comment: occ     Drug use: No    Sexual activity: Defer         CURRENT MEDICATIONS    Current Outpatient Medications:     Adalimumab (Humira, 2 Pen,) 40 MG/0.4ML Pen-injector Kit, Inject 40 mg under the skin into the appropriate area as directed Every 14 (Fourteen) Days., Disp: 2 each, Rfl: 11    carvedilol (COREG) 3.125 MG tablet, TAKE 1 TABLET BY MOUTH 2 TIMES A DAY AS DIRECTED., Disp: 180 tablet, Rfl: 1    escitalopram (LEXAPRO) 5 MG tablet, Take 1 tablet by mouth Daily., Disp: , Rfl:     losartan (COZAAR) 25 MG tablet, Take 1 tablet by  "mouth Daily., Disp: 90 tablet, Rfl: 2    omeprazole (priLOSEC) 40 MG capsule, TAKE 1 CAPSULE BY MOUTH DAILY., Disp: 90 capsule, Rfl: 3    rosuvastatin (CRESTOR) 5 MG tablet, Take 1 tablet by mouth Daily., Disp: , Rfl:     solifenacin (VESICARE) 5 MG tablet, , Disp: , Rfl:     traZODone (DESYREL) 100 MG tablet, Take 1 tablet by mouth Every Night., Disp: , Rfl:     zolpidem CR (AMBIEN CR) 12.5 MG CR tablet, Take  by mouth., Disp: , Rfl:     ALLERGIES  Sulfa antibiotics and Warfarin    VITALS  Vitals:    11/18/24 1056   BP: 110/70   BP Location: Left arm   Patient Position: Sitting   Cuff Size: Adult   Weight: 78 kg (172 lb)   Height: 174 cm (68.5\")       PHYSICAL EXAM  Debilities/Disabilities Identified: None  Emotional Behavior: Appropriate  Wt Readings from Last 3 Encounters:   11/18/24 78 kg (172 lb)   10/21/24 76.2 kg (168 lb)   09/20/24 73.5 kg (162 lb)     Ht Readings from Last 1 Encounters:   11/18/24 174 cm (68.5\")     Body mass index is 25.77 kg/m².  Physical Exam  Constitutional:       General: He is not in acute distress.     Appearance: Normal appearance. He is not ill-appearing.   HENT:      Head: Normocephalic and atraumatic.      Mouth/Throat:      Mouth: Mucous membranes are moist.      Pharynx: No posterior oropharyngeal erythema.   Eyes:      General: No scleral icterus.  Cardiovascular:      Rate and Rhythm: Normal rate and regular rhythm.      Heart sounds: Normal heart sounds.   Pulmonary:      Effort: Pulmonary effort is normal.      Breath sounds: Normal breath sounds.   Abdominal:      General: Abdomen is flat. Bowel sounds are normal. There is no distension.      Palpations: Abdomen is soft. There is no mass.      Tenderness: There is no abdominal tenderness. There is no guarding or rebound. Negative signs include Mendoza's sign.      Hernia: No hernia is present.   Musculoskeletal:      Cervical back: Neck supple.   Skin:     General: Skin is warm.      Capillary Refill: Capillary refill takes " less than 2 seconds.   Neurological:      General: No focal deficit present.      Mental Status: He is alert and oriented to person, place, and time.   Psychiatric:         Mood and Affect: Mood normal.         Behavior: Behavior normal.         Thought Content: Thought content normal.         Judgment: Judgment normal.         CLINICAL DATA REVIEWED   reviewed previous lab results and integrated with today's visit, reviewed notes from other physicians and/or last GI encounter, reviewed previous endoscopy results and available photos, reviewed surgical pathology results from previous biopsies    ASSESSMENT  Diagnoses and all orders for this visit:    Ulcerative pancolitis without complication    Gastroesophageal reflux disease with esophagitis without hemorrhage    Other orders  -     escitalopram (LEXAPRO) 5 MG tablet; Take 1 tablet by mouth Daily.  -     solifenacin (VESICARE) 5 MG tablet          PLAN    UC: Continue humira, fecal harmony next OV  GERD: Continue current meds    Return in about 6 months (around 5/18/2025).    I have discussed the above plan with the patient.  They verbalize understanding and are in agreement with the plan.  They have been advised to contact the office for any questions, concerns, or changes related to their health.

## 2025-04-29 ENCOUNTER — TELEPHONE (OUTPATIENT)
Dept: MAMMOGRAPHY | Facility: CLINIC | Age: 70
End: 2025-04-29
Payer: MEDICARE

## 2025-04-29 NOTE — TELEPHONE ENCOUNTER
Pt called to reschedule appt w Dr. Andrade. Spoke with CMA and move appt.  Called pt back and lvm letting him know his f/u is rescheduled with Dr. Zarate after MRI breast ileana on 5/2 for 5/9 @ 9:30AM.  Mailed out reminder letter. MB

## 2025-05-02 ENCOUNTER — HOSPITAL ENCOUNTER (OUTPATIENT)
Dept: MRI IMAGING | Facility: HOSPITAL | Age: 70
Discharge: HOME | End: 2025-05-02
Payer: MEDICARE

## 2025-05-02 DIAGNOSIS — R92.8 ABNORMAL MRI, BREAST: ICD-10-CM

## 2025-05-02 DIAGNOSIS — N62 GYNECOMASTIA, MALE: ICD-10-CM

## 2025-05-02 DIAGNOSIS — R92.8 ABNORMAL ULTRASOUND OF BREAST: ICD-10-CM

## 2025-05-02 DIAGNOSIS — N63.20 MASS OF LEFT BREAST, UNSPECIFIED QUADRANT: ICD-10-CM

## 2025-05-02 DIAGNOSIS — N64.89 PSEUDOANGIOMATOUS STROMAL HYPERPLASIA OF BREAST: ICD-10-CM

## 2025-05-02 PROCEDURE — 25510000002 GADOBENATE DIMEGLUMINE 529 MG/ML SOLUTION: Performed by: SURGERY

## 2025-05-02 PROCEDURE — A9577 INJ MULTIHANCE: HCPCS | Performed by: SURGERY

## 2025-05-02 PROCEDURE — C8937 CAD BREAST MRI: HCPCS

## 2025-05-02 PROCEDURE — C8908 MRI W/O FOL W/CONT, BREAST,: HCPCS

## 2025-05-02 RX ADMIN — GADOBENATE DIMEGLUMINE 16 ML: 529 INJECTION, SOLUTION INTRAVENOUS at 12:09

## 2025-05-08 NOTE — PROGRESS NOTES
General History:  Teofilo Jade is a 69 y.o. male with the following history: He was initially seen by Dr Andrade for a concern for a visible/palpable breast mass - left. He underwent mammogram which showed a left hypoechoic mass in the subareolar region which was biopsied and showed gynecomastoid hyperplasia and focal fibroadenomatoid change. MRI was then completed to ensure complete work up - which showed a larger area of non-mass enhancement. Recommended short term follow-up. .     Interval History: he has been in his usual state of health, no new complaints, feels the breast tissue may be a little smaller. Not causing him any troubles anymore.     Past Medical History:   Diagnosis Date    Anxiety     Colon polyp     GERD (gastroesophageal reflux disease)     History of cardiomyopathy 03/28/2018    Hypertension     Nonischemic cardiomyopathy     SEBASTIAN (obstructive sleep apnea)     Ulcer of abdomen wall      Patient Active Problem List   Diagnosis    Hypertension    SEBASTIAN (obstructive sleep apnea)    Rectal bleeding    Generalized abdominal pain    Diarrhea    Ulcerative pancolitis without complication    History of cardiomyopathy    Dysphagia    Non-intractable vomiting    LBBB (left bundle branch block)    Nasal drainage    Ulcerative pancolitis without complication    Osteopenia    Screening-pulmonary TB    Gastroesophageal reflux disease with esophagitis    History of 2019 novel coronavirus disease (COVID-19)     Current Outpatient Medications on File Prior to Visit   Medication Sig Dispense Refill    Adalimumab (Humira, 2 Pen,) 40 MG/0.4ML Pen-injector Kit Inject 40 mg under the skin into the appropriate area as directed Every 14 (Fourteen) Days. 2 each 11    carvedilol (COREG) 3.125 MG tablet TAKE 1 TABLET BY MOUTH 2 TIMES A DAY AS DIRECTED. 180 tablet 1    escitalopram (LEXAPRO) 5 MG tablet Take 1 tablet by mouth Daily.      losartan (COZAAR) 25 MG tablet Take 1 tablet by mouth Daily. 90 tablet 2     omeprazole (priLOSEC) 40 MG capsule TAKE 1 CAPSULE BY MOUTH DAILY. 90 capsule 3    rosuvastatin (CRESTOR) 5 MG tablet Take 1 tablet by mouth Daily.      traZODone (DESYREL) 100 MG tablet Take 1 tablet by mouth Every Night.      zolpidem CR (AMBIEN CR) 12.5 MG CR tablet Take  by mouth.       No current facility-administered medications on file prior to visit.     Allergies   Allergen Reactions    Sulfa Antibiotics Rash     Per pt broke out in rash and blisters    Warfarin Rash       Past Surgical History:   Procedure Laterality Date    COLONOSCOPY      COLONOSCOPY N/A 10/2/2017    Procedure: COLONOSCOPY, biopsy; polypectomy;  Surgeon: Sushila Amezquita MD;  Location: [facility];  Service:     COLONOSCOPY N/A 4/28/2023    Procedure: COLONOSCOPY WITH BIOPSY;  Surgeon: Vini Lorenzana MD;  Location: [facility];  Service: Gastroenterology;  Laterality: N/A;  Active Ulcerative colitis; Biopsies- right colon, transverse, sigmoid and rectal;    ENDOSCOPY N/A 7/10/2020    Procedure: ESOPHAGOGASTRODUODENOSCOPY, biopsy;  Surgeon: Vini Lorenzana MD;  Location: [facility];  Service: Gastroenterology;  Laterality: N/A;  Ulcerative esophagitis; Biopsies- gastric and distal esophagus; gastritis    KNEE SURGERY  2002 2003    both knee     Social History     Socioeconomic History    Marital status: Single   Tobacco Use    Smoking status: Former     Passive exposure: Never    Smokeless tobacco: Never    Tobacco comments:     caffine 3 cups daily - quit 5 yrs ago   Vaping Use    Vaping status: Never Used   Substance and Sexual Activity    Alcohol use: Not Currently     Comment: occ     Drug use: No    Sexual activity: Defer     Family History   Problem Relation Age of Onset    Heart disease Mother     Diabetes Mother     Stroke Mother     Hypertension Mother     Colon polyps Mother     Heart disease Father     Diabetes Father         Review of Systems:  CONSTITUTIONAL: No weight loss, fever, chills, weakness or  fatigue.  HEENT: Eyes: No visual loss, blurred vision, double vision or yellow sclerae. Ears, Nose, Throat: No hearing loss, sneezing, congestion, runny nose or sore throat.  SKIN: No rash or itching.  CARDIOVASCULAR: No chest pain, chest pressure or chest discomfort. No palpitations or edema.  RESPIRATORY: No shortness of breath, cough or sputum.  GASTROINTESTINAL: No anorexia, nausea, vomiting or diarrhea. No abdominal pain or blood.  GENITOURINARY: No burning on urination  NEUROLOGICAL: No headache, dizziness, syncope, paralysis, ataxia, numbness or tingling in the extremities. No change in bowel or bladder control.  MUSCULOSKELETAL: No muscle, back pain, joint pain or stiffness.  HEMATOLOGIC: No anemia, bleeding or bruising.  LYMPHATICS: No enlarged nodes.  PSYCHIATRIC: No history of depression or anxiety.  ENDOCRINOLOGIC: No reports of sweating, cold or heat intolerance. No polyuria or polydipsia.  ALLERGIES: No history of asthma, hives, eczema or rhinitis.    Physical Exam:  Vitals:    25 1113   BP: 152/80   Pulse: 58         General: Alert, cooperative    HEENT: Atraumatic, normocephalic    Oral/Maxillofacial: Moist mucous membranes    Neck: Supple     Lungs: EWOB, clear to auscultation bilaterally     Heart: RRR    Breast: no palpable size difference on exam, no palpable masses on either side - Bilateral breasts examined sitting and supine.  RIGHT- no masses, skin changes, or nipple discharge  LEFT- no masses, skin changes, or nipple discharge    Lymphatics:  Bilateral supraclavicular, cervical, and axillary basins without lymphadneopathy    Abdomen: Soft, non-tender, non-distended    Extremities: normal strength and sensation.  No obvious deformities.     Neuro: alert, normal speech, no focal findings or movement disorder noted     Skin: no lesions or abrasions      Recent Imagin25 Breast MRI  FINDINGS:     There is heterogeneous fibroglandular tissue in the left breast and  there is minimal  fibroglandular tissue in the right breast, compatible  with gynecomastia. There is minimal background parenchymal enhancement  in the breasts. There is no mass or suspicious non-mass enhancement in  either breast. There are two foci of magnetic susceptibility artifact in  the left breast (12 o'clock and 6 o'clock positions) corresponding to  biopsy-site marking clips placed at the sites of prior benign biopsies.      There are no enlarged axillary or internal mammary chain lymph nodes on  either side.     IMPRESSION:  1. No MR evidence of malignancy in either breast.  2. Minimal right gynecomastia and moderate left gynecomastia.  BI-RADS Category 2    Assessment/Plan: Teofilo Jade is a 69 y.o.male with h/o left breast benign biopsy, non-mass enhancement on MRI for gynecomastia. Follow up imaging is normal  - RTC as needed for clinical breast exam if further changes are noted  - Next imaging due NA  - would recommend self breast exams at home  - has issues with symptoms of chronic UTI - frequency. Has appointment with urology. Recommend keeping this appointment.     Time-Based Care: The total time today, spent on care for this patient, was 20 minutes which included preparing to see the patient, obtaining and/or reviewing a separately obtained history, performing a medically necessary exam and evaluation, counseling the patient/family/caregiver, ordering medications, tests and/or procedures, documenting clinical information in the medical record, independently interpreting results, communicating results to the patient/family/caregiver, care coordination and referring and communicating with others. This time was independent and non-overlapping.    Rosemary Zarate MD  Breast Surgical Oncology

## 2025-05-09 ENCOUNTER — OFFICE VISIT (OUTPATIENT)
Dept: SURGERY | Facility: CLINIC | Age: 70
End: 2025-05-09
Payer: MEDICARE

## 2025-05-09 VITALS — DIASTOLIC BLOOD PRESSURE: 80 MMHG | SYSTOLIC BLOOD PRESSURE: 152 MMHG | HEART RATE: 58 BPM

## 2025-05-09 DIAGNOSIS — N62 GYNECOMASTIA, MALE: ICD-10-CM

## 2025-05-09 DIAGNOSIS — N63.20 MASS OF LEFT BREAST, UNSPECIFIED QUADRANT: Primary | ICD-10-CM

## 2025-05-09 DIAGNOSIS — R92.8 ABNORMAL MRI, BREAST: ICD-10-CM

## 2025-05-12 ENCOUNTER — OFFICE VISIT (OUTPATIENT)
Dept: GASTROENTEROLOGY | Facility: CLINIC | Age: 70
End: 2025-05-12
Payer: MEDICARE

## 2025-05-12 ENCOUNTER — LAB (OUTPATIENT)
Dept: LAB | Facility: HOSPITAL | Age: 70
End: 2025-05-12
Payer: MEDICARE

## 2025-05-12 VITALS
SYSTOLIC BLOOD PRESSURE: 140 MMHG | DIASTOLIC BLOOD PRESSURE: 80 MMHG | HEIGHT: 69 IN | BODY MASS INDEX: 26.31 KG/M2 | WEIGHT: 177.6 LBS

## 2025-05-12 DIAGNOSIS — K21.00 GASTROESOPHAGEAL REFLUX DISEASE WITH ESOPHAGITIS WITHOUT HEMORRHAGE: Primary | ICD-10-CM

## 2025-05-12 DIAGNOSIS — K51.00 ULCERATIVE PANCOLITIS WITHOUT COMPLICATION: ICD-10-CM

## 2025-05-12 DIAGNOSIS — I10 ESSENTIAL (PRIMARY) HYPERTENSION: ICD-10-CM

## 2025-05-12 DIAGNOSIS — Z11.59 ENCOUNTER FOR SCREENING FOR OTHER VIRAL DISEASES: ICD-10-CM

## 2025-05-12 LAB — CRP SERPL-MCNC: 0.02 MG/DL (ref 0.01–0.5)

## 2025-05-12 PROCEDURE — 86141 C-REACTIVE PROTEIN HS: CPT

## 2025-05-12 PROCEDURE — 86704 HEP B CORE ANTIBODY TOTAL: CPT

## 2025-05-12 PROCEDURE — 36415 COLL VENOUS BLD VENIPUNCTURE: CPT

## 2025-05-12 PROCEDURE — 86480 TB TEST CELL IMMUN MEASURE: CPT

## 2025-05-12 RX ORDER — ALFUZOSIN HYDROCHLORIDE 10 MG/1
10 TABLET, EXTENDED RELEASE ORAL DAILY
COMMUNITY

## 2025-05-12 NOTE — PROGRESS NOTES
PATIENT INFORMATION  Teofilo Jade       - 1955    CHIEF COMPLAINT  Chief Complaint   Patient presents with    Follow-up    Ulcerative pancolitis without complication       HISTORY OF PRESENT ILLNESS    Here for UC follow-up     Bowels are moving daily, no rectal bleeding or cramping, feeling well controlled, labs q3m with PCP, due for biologic labs now.     Issues with UTIs since , frequency, started on vesicare recently with improvement.     L breast scare, 2 bx and 2 MRI came back normal. Dx with PASH. Returned from [place] in April, MRI stable and cleared by breast sx.    Having issues with urinary urgency, flomax not helpful, now on alfuzosin with no response.     GERD: Managed on 40 mg omeprazole daily. No HB or indigestion. Still well controlled.      2023 Colon active UC rectum and sigmoid, due 2026.  7/10/2020 last EGD for dysphagia positive for ulcerative esophagitis, HP gastritis, negative for Barretts.   10/2/2017 Colon with UC active in ascending colon.          REVIEWED PERTINENT RESULTS/ LABS  Lab Results   Component Value Date    CASEREPORT  2024     Surgical Pathology Report                         Case: [id number]                                  Authorizing Provider:  Shannon Andrade MD    Collected:           2024 10:52 AM          Ordering Location:     [facility]  Received:            2024 11:35 AM                                 MRI                                                                          Pathologist:           Tierney Machado MD                                                          Specimen:    Breast, Left, Left breast mri bx by Dr. Osuna/ bx time 10:52/ formalin time 11:05/                # cores 4/ 9 gauge probe/ clock face 12:00                                                 FINALDX  2024     1.  Left breast, 12:00, MRI-guided core needle biopsy (stoplight clip):   A.  Predominantly fatty replaced  breast parenchyma with gynecomastoid-type changes and         pseudoangiomatous stromal hyperplasia (PASH).       Lab Results   Component Value Date    HGB 14.6 04/11/2023    MCV 83.2 04/11/2023     04/11/2023    ALT 8 10/17/2019    AST 11 10/17/2019      MRI Breast Bilateral Diagnostic W WO Contrast  Result Date: 5/4/2025  Narrative: BILATERAL BREAST MRI WITHOUT AND WITH INTRAVENOUS CONTRAST, WITH COMPUTER-AIDED DETECTION, 05/02/2025:  CLINICAL HISTORY: 69-year-old male with bilateral gynecomastia and a history of two prior benign left breast biopsies with histology revealing pseudoangiomatous stromal hyperplasia at the site of most recent MR-guided left breast biopsy in the 12 o'clock position of the left breast.  TECHNIQUE: Axial T1, axial STIR, sagittal gradient echo T1 fat-sat dynamic intravenous contrast-enhanced sequences with subtractions and 3D MIP reconstructions, and axial gradient echo T1 fat-sat postcontrast sequences were obtained through the breasts. 16 mL of intravenous MultiHance was administered. MumboeaCAD was utilized in the interpretation of the exam.  COMPARISON: Bilateral breast MRI from 09/15/2024, an MR-guided left breast biopsy procedure with post-biopsy diagnostic left mammogram from 09/20/2024, an ultrasound of the left breast from 09/15/2024 and bilateral diagnostic mammograms from 08/15/2024.  FINDINGS:  There is heterogeneous fibroglandular tissue in the left breast and there is minimal fibroglandular tissue in the right breast, compatible with gynecomastia. There is minimal background parenchymal enhancement in the breasts. There is no mass or suspicious non-mass enhancement in either breast. There are two foci of magnetic susceptibility artifact in the left breast (12 o'clock and 6 o'clock positions) corresponding to biopsy-site marking clips placed at the sites of prior benign biopsies.  There are no enlarged axillary or internal mammary chain lymph nodes on either side.       Impression:  1. No MR evidence of malignancy in either breast. 2. Minimal right gynecomastia and moderate left gynecomastia.  BI-RADS Category 2: Benign findings.    This report was finalized on 5/4/2025 8:50 AM by Dr. García Clarke M.D on Workstation: [id number]        REVIEW OF SYSTEMS  Review of Systems      ACTIVE PROBLEMS  Patient Active Problem List    Diagnosis     History of 2019 novel coronavirus disease (COVID-19) [Z86.16]     Gastroesophageal reflux disease with esophagitis [K21.00]     Screening-pulmonary TB [Z11.1]     Osteopenia [M85.80]     Ulcerative pancolitis without complication [K51.00]     Nasal drainage [J34.89]     LBBB (left bundle branch block) [I44.7]     Dysphagia [R13.10]     Non-intractable vomiting [R11.10]     History of cardiomyopathy [Z86.79]     Ulcerative pancolitis without complication [K51.00]     Rectal bleeding [K62.5]     Generalized abdominal pain [R10.84]     Diarrhea [R19.7]     Hypertension [I10]     SEBASTIAN (obstructive sleep apnea) [G47.33]          PAST MEDICAL HISTORY  Past Medical History:   Diagnosis Date    Anxiety     Colon polyp     GERD (gastroesophageal reflux disease)     History of cardiomyopathy 03/28/2018    Hypertension     Nonischemic cardiomyopathy     SEBASTIAN (obstructive sleep apnea)     Ulcer of abdomen wall          SURGICAL HISTORY  Past Surgical History:   Procedure Laterality Date    COLONOSCOPY      COLONOSCOPY N/A 10/2/2017    Procedure: COLONOSCOPY, biopsy; polypectomy;  Surgeon: Sushila Amezquita MD;  Location: [facility];  Service:     COLONOSCOPY N/A 4/28/2023    Procedure: COLONOSCOPY WITH BIOPSY;  Surgeon: Vini Lorenzana MD;  Location: [facility] OR;  Service: Gastroenterology;  Laterality: N/A;  Active Ulcerative colitis; Biopsies- right colon, transverse, sigmoid and rectal;    ENDOSCOPY N/A 7/10/2020    Procedure: ESOPHAGOGASTRODUODENOSCOPY, biopsy;  Surgeon: Vini Lorenzana MD;  Location: [facility] OR;  Service:  Gastroenterology;  Laterality: N/A;  Ulcerative esophagitis; Biopsies- gastric and distal esophagus; gastritis    KNEE SURGERY  2002 2003    both knee         FAMILY HISTORY  Family History   Problem Relation Age of Onset    Heart disease Mother     Diabetes Mother     Stroke Mother     Hypertension Mother     Colon polyps Mother     Heart disease Father     Diabetes Father          SOCIAL HISTORY  Social History     Occupational History    Not on file   Tobacco Use    Smoking status: Former     Passive exposure: Never    Smokeless tobacco: Never    Tobacco comments:     caffine 3 cups daily - quit 5 yrs ago   Vaping Use    Vaping status: Never Used   Substance and Sexual Activity    Alcohol use: Not Currently     Comment: occ     Drug use: No    Sexual activity: Defer         CURRENT MEDICATIONS    Current Outpatient Medications:     Adalimumab (Humira, 2 Pen,) 40 MG/0.4ML Pen-injector Kit, Inject 40 mg under the skin into the appropriate area as directed Every 14 (Fourteen) Days., Disp: 2 each, Rfl: 11    alfuzosin (UROXATRAL) 10 MG 24 hr tablet, Take 1 tablet by mouth Daily., Disp: , Rfl:     carvedilol (COREG) 3.125 MG tablet, TAKE 1 TABLET BY MOUTH 2 TIMES A DAY AS DIRECTED., Disp: 180 tablet, Rfl: 1    escitalopram (LEXAPRO) 5 MG tablet, Take 1 tablet by mouth Daily., Disp: , Rfl:     losartan (COZAAR) 25 MG tablet, Take 1 tablet by mouth Daily., Disp: 90 tablet, Rfl: 2    omeprazole (priLOSEC) 40 MG capsule, TAKE 1 CAPSULE BY MOUTH DAILY., Disp: 90 capsule, Rfl: 3    rosuvastatin (CRESTOR) 5 MG tablet, Take 1 tablet by mouth Daily., Disp: , Rfl:     traZODone (DESYREL) 100 MG tablet, Take 1 tablet by mouth Every Night., Disp: , Rfl:     zolpidem CR (AMBIEN CR) 12.5 MG CR tablet, Take  by mouth., Disp: , Rfl:     ALLERGIES  Sulfa antibiotics and Warfarin    VITALS  Vitals:    05/12/25 1051   BP: 140/80   BP Location: Left arm   Patient Position: Sitting   Cuff Size: Adult   Weight: 80.6 kg (177 lb 9.6 oz)  "  Height: 174 cm (68.5\")       PHYSICAL EXAM  Debilities/Disabilities Identified: None  Emotional Behavior: Appropriate  Wt Readings from Last 3 Encounters:   05/12/25 80.6 kg (177 lb 9.6 oz)   11/18/24 78 kg (172 lb)   10/21/24 76.2 kg (168 lb)     Ht Readings from Last 1 Encounters:   05/12/25 174 cm (68.5\")     Body mass index is 26.61 kg/m².  Physical Exam  Constitutional:       General: He is not in acute distress.     Appearance: Normal appearance. He is not ill-appearing.   HENT:      Head: Normocephalic and atraumatic.      Mouth/Throat:      Mouth: Mucous membranes are moist.      Pharynx: No posterior oropharyngeal erythema.   Eyes:      General: No scleral icterus.  Cardiovascular:      Rate and Rhythm: Normal rate and regular rhythm.      Heart sounds: Normal heart sounds.   Pulmonary:      Effort: Pulmonary effort is normal.      Breath sounds: Normal breath sounds.   Abdominal:      General: Abdomen is flat. Bowel sounds are normal. There is no distension.      Palpations: Abdomen is soft. There is no mass.      Tenderness: There is no abdominal tenderness. There is no guarding or rebound. Negative signs include Mendoza's sign.      Hernia: No hernia is present.   Musculoskeletal:      Cervical back: Neck supple.   Skin:     General: Skin is warm.      Capillary Refill: Capillary refill takes less than 2 seconds.   Neurological:      General: No focal deficit present.      Mental Status: He is alert and oriented to person, place, and time.   Psychiatric:         Mood and Affect: Mood normal.         Behavior: Behavior normal.         Thought Content: Thought content normal.         Judgment: Judgment normal.         CLINICAL DATA REVIEWED   reviewed previous lab results and integrated with today's visit, reviewed notes from other physicians and/or last GI encounter, reviewed previous endoscopy results and available photos, reviewed surgical pathology results from previous " biopsies    ASSESSMENT  Diagnoses and all orders for this visit:    Gastroesophageal reflux disease with esophagitis without hemorrhage  -     Cancel: CBC & Differential  -     Cancel: Comprehensive Metabolic Panel    Ulcerative pancolitis without complication  -     QuantiFERON-TB Gold Plus; Future  -     Hepatitis B Core Antibody, Total  -     High Sensitivity CRP; Future  -     Cancel: CBC & Differential  -     Cancel: Comprehensive Metabolic Panel  -     Calprotectin, Fecal - Stool, Per Rectum    Encounter for screening for other viral diseases  -     Hepatitis B Core Antibody, Total    Essential (primary) hypertension  -     High Sensitivity CRP; Future    Other orders  -     alfuzosin (UROXATRAL) 10 MG 24 hr tablet; Take 1 tablet by mouth Daily.          PLAN    Biologic labs today, return fecal Nick protectin  As long as labs look good, not due for Colon until 4/2026  GERD: Continue current management    Return in about 6 months (around 11/12/2025).    I have discussed the above plan with the patient.  They verbalize understanding and are in agreement with the plan.  They have been advised to contact the office for any questions, concerns, or changes related to their health.

## 2025-05-13 LAB — HBV CORE AB SERPL QL IA: NEGATIVE

## 2025-05-14 ENCOUNTER — PATIENT MESSAGE (OUTPATIENT)
Dept: GASTROENTEROLOGY | Facility: CLINIC | Age: 70
End: 2025-05-14
Payer: MEDICARE

## 2025-05-14 LAB
GAMMA INTERFERON BACKGROUND BLD IA-ACNC: 0.08 IU/ML
M TB IFN-G BLD-IMP: NEGATIVE
M TB IFN-G CD4+ BCKGRND COR BLD-ACNC: 0.1 IU/ML
M TB IFN-G CD4+CD8+ BCKGRND COR BLD-ACNC: 0.09 IU/ML
MITOGEN IGNF BCKGRD COR BLD-ACNC: >10 IU/ML
QUANTIFERON INCUBATION: NORMAL
SERVICE CMNT-IMP: NORMAL

## 2025-05-15 ENCOUNTER — TRANSCRIBE ORDERS (OUTPATIENT)
Dept: ADMINISTRATIVE | Facility: HOSPITAL | Age: 70
End: 2025-05-15
Payer: MEDICARE

## 2025-05-15 ENCOUNTER — LAB (OUTPATIENT)
Dept: LAB | Facility: HOSPITAL | Age: 70
End: 2025-05-15
Payer: MEDICARE

## 2025-05-15 DIAGNOSIS — K51.00: Primary | ICD-10-CM

## 2025-05-15 DIAGNOSIS — K51.00: ICD-10-CM

## 2025-05-15 PROCEDURE — 83993 ASSAY FOR CALPROTECTIN FECAL: CPT

## 2025-05-18 LAB — CALPROTECTIN STL-MCNT: 94 UG/G (ref 0–120)

## 2025-06-18 RX ORDER — ADALIMUMAB 40MG/0.8ML
40 KIT SUBCUTANEOUS
Qty: 1.6 ML | Refills: 11 | Status: SHIPPED | OUTPATIENT
Start: 2025-06-18

## 2025-07-08 ENCOUNTER — TELEPHONE (OUTPATIENT)
Dept: GASTROENTEROLOGY | Facility: CLINIC | Age: 70
End: 2025-07-08
Payer: MEDICARE

## 2025-07-08 DIAGNOSIS — K51.00 ULCERATIVE PANCOLITIS WITHOUT COMPLICATION: Primary | ICD-10-CM

## 2025-07-08 RX ORDER — ADALIMUMAB 40MG/0.8ML
40 KIT SUBCUTANEOUS
Qty: 1.6 ML | Refills: 11 | Status: SHIPPED | OUTPATIENT
Start: 2025-07-08 | End: 2025-07-10 | Stop reason: SDUPTHER

## 2025-07-08 NOTE — TELEPHONE ENCOUNTER
No refill rx from [facility]   Original RX in June -     Attempted to reach pharmacy currently closed - will reopen at 2pm    Called pt was getting at [facility]- transferred to another pharmacy at [facility] but keeps getting runaround-    Okay to send to [facility] in [place]

## 2025-07-08 NOTE — TELEPHONE ENCOUNTER
Hub staff attempted to follow warm transfer process and was unsuccessful     Caller: Teofilo Jade    Relationship to patient: Self    Best call back number: [phone]     Patient is needing: THE PT WOULD LIKE TO SPEAK WITH SOMEONE ABOUT GETTING HIS PRESCRIPTION REFILL FOR     Adalimumab (Humira, 2 Pen,) 40 MG/0.8ML Auto-injector Kit  40 mg, Every 14 Days   [facility] HAS BEEN UNABLE TO FILL IT AND HE IS COMPLETELY OUT. HE NEEDS TO USE A SPECIALTY PHARMACY. PLEASE ADVISE.

## 2025-08-06 ENCOUNTER — OFFICE VISIT (OUTPATIENT)
Dept: CARDIOLOGY | Facility: CLINIC | Age: 70
End: 2025-08-06
Payer: MEDICARE

## 2025-08-06 VITALS
BODY MASS INDEX: 26.61 KG/M2 | HEART RATE: 56 BPM | DIASTOLIC BLOOD PRESSURE: 84 MMHG | HEIGHT: 69 IN | SYSTOLIC BLOOD PRESSURE: 130 MMHG | OXYGEN SATURATION: 96 %

## 2025-08-06 DIAGNOSIS — G47.33 OSA (OBSTRUCTIVE SLEEP APNEA): Chronic | ICD-10-CM

## 2025-08-06 DIAGNOSIS — Z86.79 HISTORY OF CARDIOMYOPATHY: Chronic | ICD-10-CM

## 2025-08-06 DIAGNOSIS — I10 PRIMARY HYPERTENSION: Chronic | ICD-10-CM

## 2025-08-06 DIAGNOSIS — I44.7 LBBB (LEFT BUNDLE BRANCH BLOCK): Primary | Chronic | ICD-10-CM

## 2025-08-06 PROCEDURE — 93000 ELECTROCARDIOGRAM COMPLETE: CPT | Performed by: INTERNAL MEDICINE

## 2025-08-06 PROCEDURE — 3079F DIAST BP 80-89 MM HG: CPT | Performed by: INTERNAL MEDICINE

## 2025-08-06 PROCEDURE — 3075F SYST BP GE 130 - 139MM HG: CPT | Performed by: INTERNAL MEDICINE

## 2025-08-06 PROCEDURE — 1160F RVW MEDS BY RX/DR IN RCRD: CPT | Performed by: INTERNAL MEDICINE

## 2025-08-06 PROCEDURE — 1159F MED LIST DOCD IN RCRD: CPT | Performed by: INTERNAL MEDICINE

## 2025-08-06 PROCEDURE — 99214 OFFICE O/P EST MOD 30 MIN: CPT | Performed by: INTERNAL MEDICINE

## 2025-08-06 RX ORDER — SOLIFENACIN SUCCINATE 5 MG/1
5 TABLET, FILM COATED ORAL DAILY
COMMUNITY
Start: 2025-07-22

## (undated) DEVICE — BW-412T DISP COMBO CLEANING BRUSH: Brand: SINGLE USE COMBINATION CLEANING BRUSH

## (undated) DEVICE — VIAL FORMALIN CAP 10P 40ML

## (undated) DEVICE — SPNG GZ WOVN 4X4IN 12PLY 10/BX STRL

## (undated) DEVICE — Device

## (undated) DEVICE — SAFELINER SUCTION CANISTER 1000CC: Brand: DEROYAL

## (undated) DEVICE — SUCTION CANISTER, 3000CC,SAFELINER: Brand: DEROYAL

## (undated) DEVICE — KT ORCA ORCAPOD DISP STRL

## (undated) DEVICE — THE BITE BLOCK MAXI, LATEX FREE STRAP IS USED TO PROTECT THE ENDOSCOPE INSERTION TUBE FROM BEING BITTEN BY THE PATIENT.

## (undated) DEVICE — TRAP SPECI MUCUS 80CC

## (undated) DEVICE — Device: Brand: DEFENDO AIR/WATER/SUCTION AND BIOPSY VALVE

## (undated) DEVICE — GLV SURG SENSICARE PI MIC PF SZ8 LF STRL

## (undated) DEVICE — MASK,FACE,SHIELD,BLUE,ANTI FOG,TIES: Brand: MEDLINE

## (undated) DEVICE — SYR LUER SLPTP 50ML

## (undated) DEVICE — SYR LL 3CC

## (undated) DEVICE — GOWN ISOL W/THUMB UNIV BLU BX/15

## (undated) DEVICE — JACKT LAB F/R KNIT CUFF/COLR XLG BLU

## (undated) DEVICE — FRCP BX RADJAW4 NDL 2.8 240CM LG OG BX40

## (undated) DEVICE — GLV SURG SENSICARE PI MIC PF SZ7.5 LF STRL

## (undated) DEVICE — JACKT LAB KNIT COLR LG BLU

## (undated) DEVICE — ADAPT CLN BIOGUARD AIR/H2O DISP